# Patient Record
Sex: FEMALE | Race: WHITE | NOT HISPANIC OR LATINO | Employment: FULL TIME | URBAN - METROPOLITAN AREA
[De-identification: names, ages, dates, MRNs, and addresses within clinical notes are randomized per-mention and may not be internally consistent; named-entity substitution may affect disease eponyms.]

---

## 2019-12-13 ENCOUNTER — OFFICE VISIT (OUTPATIENT)
Dept: OBSTETRICS AND GYNECOLOGY | Age: 39
End: 2019-12-13

## 2019-12-13 VITALS
DIASTOLIC BLOOD PRESSURE: 86 MMHG | HEIGHT: 66 IN | BODY MASS INDEX: 47.09 KG/M2 | SYSTOLIC BLOOD PRESSURE: 128 MMHG | WEIGHT: 293 LBS

## 2019-12-13 DIAGNOSIS — N46.9 MALE FACTOR INFERTILITY: ICD-10-CM

## 2019-12-13 DIAGNOSIS — E61.1 LOW IRON: ICD-10-CM

## 2019-12-13 DIAGNOSIS — Z00.00 HEALTH MAINTENANCE EXAMINATION: Primary | ICD-10-CM

## 2019-12-13 DIAGNOSIS — Z12.4 PAP SMEAR FOR CERVICAL CANCER SCREENING: ICD-10-CM

## 2019-12-13 DIAGNOSIS — E66.01 MORBID OBESITY WITH BMI OF 50.0-59.9, ADULT (HCC): ICD-10-CM

## 2019-12-13 LAB
BASOPHILS # BLD AUTO: 0.04 10*3/MM3 (ref 0–0.2)
BASOPHILS NFR BLD AUTO: 0.6 % (ref 0–1.5)
EOSINOPHIL # BLD AUTO: 0.1 10*3/MM3 (ref 0–0.4)
EOSINOPHIL NFR BLD AUTO: 1.4 % (ref 0.3–6.2)
ERYTHROCYTE [DISTWIDTH] IN BLOOD BY AUTOMATED COUNT: 14.3 % (ref 12.3–15.4)
FERRITIN SERPL-MCNC: 34.3 NG/ML (ref 13–150)
HCT VFR BLD AUTO: 36.5 % (ref 34–46.6)
HGB BLD-MCNC: 12 G/DL (ref 12–15.9)
IMM GRANULOCYTES # BLD AUTO: 0.01 10*3/MM3 (ref 0–0.05)
IMM GRANULOCYTES NFR BLD AUTO: 0.1 % (ref 0–0.5)
LYMPHOCYTES # BLD AUTO: 1.84 10*3/MM3 (ref 0.7–3.1)
LYMPHOCYTES NFR BLD AUTO: 25.4 % (ref 19.6–45.3)
MCH RBC QN AUTO: 27.6 PG (ref 26.6–33)
MCHC RBC AUTO-ENTMCNC: 32.9 G/DL (ref 31.5–35.7)
MCV RBC AUTO: 83.9 FL (ref 79–97)
MONOCYTES # BLD AUTO: 0.28 10*3/MM3 (ref 0.1–0.9)
MONOCYTES NFR BLD AUTO: 3.9 % (ref 5–12)
NEUTROPHILS # BLD AUTO: 4.98 10*3/MM3 (ref 1.7–7)
NEUTROPHILS NFR BLD AUTO: 68.6 % (ref 42.7–76)
NRBC BLD AUTO-RTO: 0 /100 WBC (ref 0–0.2)
PLATELET # BLD AUTO: 396 10*3/MM3 (ref 140–450)
RBC # BLD AUTO: 4.35 10*6/MM3 (ref 3.77–5.28)
WBC # BLD AUTO: 7.25 10*3/MM3 (ref 3.4–10.8)

## 2019-12-13 PROCEDURE — 99385 PREV VISIT NEW AGE 18-39: CPT | Performed by: OBSTETRICS & GYNECOLOGY

## 2019-12-13 RX ORDER — OMEPRAZOLE 40 MG/1
CAPSULE, DELAYED RELEASE ORAL
COMMUNITY
Start: 2019-12-03 | End: 2023-03-28

## 2019-12-13 NOTE — PROGRESS NOTES
Routine Annual Visit    2019    Patient: Khadra Khan          MR#:[id number]      Chief Complaint   Patient presents with   • Establish Care     New patient.  Getting ready to go through IVF to conceive. Dr. Dean ([facility])  Has been trying to lose weight for 4 years. Loses and gains constantly.  Thinking of weight loss surgery.   • Gynecologic Exam     Annual.         History of Present Illness    38 y.o. female  who presents for annual exam.   She has been sick for about a month, has taken antbx and cough medication and has been getting better.    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  They have been trying for over a year, he had a semen analysis performed that was abnormal.  He has had a sperm retrieval, and the plan is for IVF in the future.  Dr. Dean has recommended that prior to proceeding with IVF she decrease her BMI to 35.  She has considered weight loss surgery, but her current insurance will not cover it.  She is getting a new insurance plan after the first of the year.    Has regular menses, not particularly heavy and happy with them.    Recently had an issue with peripheral edema when got back from vacation. Had been eating a lot of salty food and sitting in the car. Went to urgent care and at the hospital had testing, lasix and improved. No orthopnea. Usually mild edema if has been up on her feet, goes away with elevation.    Health Maintenance  Gardasil no  Last pap 4 yrs ago, no abnormal   Mammogram not yet  Colonoscopy in high school, not yet for screening  PCP Dr. Arellano    Patient's last menstrual period was 2019 (exact date).  Obstetric History:  OB History        0    Para   0    Term   0       0    AB   0    Living   0       SAB   0    TAB   0    Ectopic   0    Molar   0    Multiple   0    Live Births   0               Menstrual History:     Patient's last menstrual period was 2019 (exact date).    "    ________________________________________  There is no problem list on file for this patient.      Past Medical History:   Diagnosis Date   • Arthritis    • Asthma     Seasonal   • H/O hiatal hernia        Family History   Problem Relation Age of Onset   • Hypertension Father    • Hypertension Mother    • Anxiety disorder Mother    • Depression Mother    • Heart disease Paternal Grandfather    • Kidney failure Maternal Grandmother         dialysis       Past Surgical History:   Procedure Laterality Date   • COLONOSCOPY      GI issues while in high school.    • TONSILLECTOMY         Social History     Tobacco Use   Smoking Status Never Smoker   Smokeless Tobacco Never Used       has a current medication list which includes the following prescription(s): omeprazole and omeprazole.  ________________________________________    Current contraception: none  History of abnormal Pap smear: no  Family history of Breast, ovarian, uterine, colon, pancreatic cancer: no  History of abnormal mammogram: NA    The following portions of the patient's history were reviewed and updated as appropriate: allergies, current medications, past family history, past medical history, past social history, past surgical history and problem list.    Review of Systems   Constitutional: Negative for fever and unexpected weight change.   HENT: Positive for postnasal drip and sore throat.    Respiratory: Positive for cough. Negative for shortness of breath.    Cardiovascular: Negative for chest pain.   Gastrointestinal: Negative for abdominal pain, constipation and diarrhea.   Genitourinary: Negative for frequency and urgency.   Hematological: Negative for adenopathy.   Psychiatric/Behavioral: Negative for dysphoric mood.       Objective   Physical Exam    /86   Ht 167.6 cm (66\")   Wt (!) 142 kg (314 lb)   LMP 12/06/2019 (Exact Date)   Breastfeeding No   BMI 50.68 kg/m²    BP Readings from Last 3 Encounters:   12/13/19 128/86   08/25/19 " 144/88   10/26/16 128/70      Wt Readings from Last 3 Encounters:   12/13/19 (!) 142 kg (314 lb)   10/26/16 132 kg (290 lb)         BMI: Body mass index is 50.68 kg/m².       General:   alert, appears stated age and cooperative   Heart:: regular rate and rhythm, S1, S2 normal, no murmur, click, rub or gallop   Lungs: normal respiratory effort and auscultation   Abdomen: soft, non-tender, without masses or organomegaly and obese   Breast: inspection negative, no nipple discharge or bleeding, no masses or nodularity palpable   Urethra and bladder: urethral meatus normal; bladder nontender to palpation;   Vulva: normal, Bartholin's, Urethra, [place]'s normal   Vagina: normal mucosa, normal discharge   Cervix: no lesions and nulliparous appearance   Uterus: normal size or anteverted   Adnexa: exam limited by habitus       Assessment:    normal annual exam   Obesity  Male factor infertility    Plan:    Plan     []  Mammogram request made  [x]  PAP done  []  Labs:   []  GC/Chl/TV  []  DEXA scan   []  Referral for colonoscopy:     Agreed that weight loss prior to IVF is important and if interested in surgery for weight loss would be in support of it. She may pursue this next year.  Plan to check TSH to ensure not missing a secondary cause of obesity and also for prepregnancy purposes    Counseling  [x]  Nutrition  [x]  Physical activity/regular exercise   [x]  Healthy weight  []  Injury prevention  []  Smoking cessation  []  Substance misuse/abuse  [x]  Sexual behavior  []  STD prevention  []  Contraception  []  Dental health  [x]  Mental health  [x]  Immunization  [x]  Encouraged SBE      Ijeoma Briceno MD  12/13/2019  8:54 AM

## 2019-12-14 PROBLEM — E66.01 MORBID OBESITY WITH BMI OF 50.0-59.9, ADULT: Status: ACTIVE | Noted: 2019-12-14

## 2019-12-14 PROBLEM — N46.9 MALE FACTOR INFERTILITY: Status: ACTIVE | Noted: 2019-12-14

## 2019-12-14 LAB
FT4I SERPL CALC-MCNC: 2.8 (ref 1.2–4.9)
T3RU NFR SERPL: 33 % (ref 24–39)
T4 SERPL-MCNC: 8.5 UG/DL (ref 4.5–12)
TSH SERPL DL<=0.005 MIU/L-ACNC: 2.71 UIU/ML (ref 0.27–4.2)

## 2019-12-16 ENCOUNTER — TELEPHONE (OUTPATIENT)
Dept: OBSTETRICS AND GYNECOLOGY | Age: 39
End: 2019-12-16

## 2019-12-16 NOTE — TELEPHONE ENCOUNTER
----- Message from Ijeoma Briceno MD sent at 12/16/2019  7:55 AM EST -----  Regarding: FW:  Notify that her thyroid is normal, no anemia, her iron is [place] but at lower end, take prenatal or other multivit with iron  ----- Message -----  From: Interface, Reflab Results In  Sent: 12/14/2019   7:36 AM EST  To: Ijeoma Briceno MD

## 2019-12-17 ENCOUNTER — TELEPHONE (OUTPATIENT)
Dept: OBSTETRICS AND GYNECOLOGY | Age: 39
End: 2019-12-17

## 2019-12-17 LAB
CYTOLOGIST CVX/VAG CYTO: NORMAL
CYTOLOGY CVX/VAG DOC CYTO: NORMAL
CYTOLOGY CVX/VAG DOC THIN PREP: NORMAL
DX ICD CODE: NORMAL
HIV 1 & 2 AB SER-IMP: NORMAL
HPV I/H RISK 4 DNA CVX QL PROBE+SIG AMP: NEGATIVE
OTHER STN SPEC: NORMAL
STAT OF ADQ CVX/VAG CYTO-IMP: NORMAL

## 2019-12-17 NOTE — TELEPHONE ENCOUNTER
----- Message from Ijeoma Briceno MD sent at 12/17/2019 11:57 AM EST -----  Please notify her pap was normal    ----- Message -----  From: Interface, Reflab Results In  Sent: 12/14/2019   7:36 AM EST  To: Ijeoma Briceno MD

## 2020-12-22 ENCOUNTER — OFFICE VISIT (OUTPATIENT)
Dept: OBSTETRICS AND GYNECOLOGY | Age: 40
End: 2020-12-22

## 2020-12-22 VITALS
SYSTOLIC BLOOD PRESSURE: 120 MMHG | DIASTOLIC BLOOD PRESSURE: 72 MMHG | WEIGHT: 283 LBS | BODY MASS INDEX: 45.48 KG/M2 | HEIGHT: 66 IN

## 2020-12-22 DIAGNOSIS — E66.01 MORBID OBESITY WITH BMI OF 50.0-59.9, ADULT (HCC): ICD-10-CM

## 2020-12-22 DIAGNOSIS — N46.9 MALE FACTOR INFERTILITY: ICD-10-CM

## 2020-12-22 DIAGNOSIS — Z01.419 WELL WOMAN EXAM WITH ROUTINE GYNECOLOGICAL EXAM: Primary | ICD-10-CM

## 2020-12-22 PROCEDURE — 99395 PREV VISIT EST AGE 18-39: CPT | Performed by: OBSTETRICS & GYNECOLOGY

## 2020-12-22 RX ORDER — FLUTICASONE PROPIONATE 50 MCG
2 SPRAY, SUSPENSION (ML) NASAL DAILY
COMMUNITY
End: 2023-02-09

## 2020-12-22 RX ORDER — ACETAMINOPHEN AND CODEINE PHOSPHATE 120; 12 MG/5ML; MG/5ML
1 SOLUTION ORAL DAILY
Qty: 84 TABLET | Refills: 3 | Status: SHIPPED | OUTPATIENT
Start: 2020-12-22 | End: 2021-11-15

## 2020-12-22 NOTE — PROGRESS NOTES
Routine Annual Visit    2020    Patient: Khadra Khan          MR#:[id number]      Chief Complaint   Patient presents with   • Gynecologic Exam     last pap 19(-) no complaints today        History of Present Illness    39 y.o. female  who presents for annual exam.   She just had gastric sleeve surgery, has been losing weight. No complications after surgery. Plan to weight about 18 mo to two years to conceive with IVF, male factor infertility. Has had semen extracted and plan for ICSI.   Has regular monthly menses, no bad cramping and not heavy  Notes that she has had elevated bp in the past, no real dx of HTN but has been elevated in the past.  Is aware that she needs to wait in order to conceive.  Her  has had abnormal semen analyses, but would rather be safe.    Health Maintenance  Last pap:  normal, history abnormal: none  Family history of Breast, ovarian, uterine, colon, pancreatic cancer: no    Current contraception: none    Patient's last menstrual period was 2020 (exact date).  Obstetric History:  OB History        0    Para   0    Term   0       0    AB   0    Living   0       SAB   0    TAB   0    Ectopic   0    Molar   0    Multiple   0    Live Births   0               Menstrual History:     Patient's last menstrual period was 2020 (exact date).       ________________________________________  Patient Active Problem List   Diagnosis   • Morbid obesity with BMI of 50.0-59.9, adult (CMS/[facility])   • Male factor infertility       Past Medical History:   Diagnosis Date   • Arthritis    • Asthma     Seasonal   • GERD (gastroesophageal reflux disease)    • H/O hiatal hernia        Family History   Problem Relation Age of Onset   • Hypertension Father    • Anxiety disorder Father    • Hypertension Mother    • Anxiety disorder Mother    • Depression Mother    • Heart disease Paternal Grandfather    • Kidney failure Maternal Grandmother         dialysis   •  "Breast cancer Neg Hx    • Ovarian cancer Neg Hx    • Uterine cancer Neg Hx    • Colon cancer Neg Hx    • Melanoma Neg Hx    • Prostate cancer Neg Hx    • Pancreatic cancer Neg Hx    • Congenital heart disease Neg Hx    • Cystic fibrosis Neg Hx        Past Surgical History:   Procedure Laterality Date   • COLONOSCOPY      GI issues while in high school.    • GASTRIC SLEEVE LAPAROSCOPIC     • TONSILLECTOMY     • TYMPANOSTOMY TUBE PLACEMENT     • WISDOM TOOTH EXTRACTION         Social History     Tobacco Use   Smoking Status Never Smoker   Smokeless Tobacco Never Used       has a current medication list which includes the following prescription(s): fluticasone, omeprazole, and omeprazole.  ________________________________________      Review of Systems   Constitutional: Negative for fever and unexpected weight change.   Respiratory: Negative for shortness of breath.    Cardiovascular: Negative for chest pain.   Gastrointestinal: Positive for constipation. Negative for abdominal pain and diarrhea.   Genitourinary: Negative for frequency and urgency.   Hematological: Negative for adenopathy.   Psychiatric/Behavioral: Negative for dysphoric mood.       Objective   Physical Exam    /72   Ht 167.6 cm (66\")   Wt 128 kg (283 lb)   LMP 12/13/2020 (Exact Date)   Breastfeeding No   BMI 45.68 kg/m²    BP Readings from Last 3 Encounters:   12/22/20 120/72   12/13/19 128/86   08/25/19 144/88      Wt Readings from Last 3 Encounters:   12/22/20 128 kg (283 lb)   12/13/19 (!) 142 kg (314 lb)   10/26/16 132 kg (290 lb)         BMI: Body mass index is 45.68 kg/m².       General:   alert, appears stated age and cooperative   Neck: No thyromegaly or LAD, no carotid bruit noted   Heart:: regular rate and rhythm, S1, S2 normal, no murmur, click, rub or gallop   Lungs: normal respiratory effort and auscultation   Abdomen: soft, non-tender, without masses or organomegaly and obese, incisions from surgery healing well, has bandaids " in place from recent mole removal with derm   Breast: inspection negative, no nipple discharge or bleeding, no masses or nodularity palpable   Urethra and bladder: urethral meatus normal; bladder nontender to palpation;   Vulva: normal, Bartholin's, Urethra, [place]'s normal   Vagina: normal mucosa, normal discharge   Cervix: no lesions and nulliparous appearance   Uterus: normal size or anteverted   Adnexa: normal adnexa and no mass, fullness, tenderness       Assessment:    normal annual exam   Obesity, recent weight loss surgery  Male factor infertility    Plan:    Plan     []  Mammogram request made  []  PAP done up-to-date  []  Labs:   []  GC/Chl/TV  []  DEXA scan   []  Referral for colonoscopy:     Recommended starting on a progestin only contraceptive due to her history of labile blood pressure.  I discussed that if her  were to somehow have some sperm in the semen, she could get pregnant and if she were to have any component of unexplained infertility, that could resolve with losing weight.  It is important that she not become pregnant immediately after weight loss surgery.    Britneyor was sent to the pharmacy    Counseling  [x]  Nutrition  [x]  Physical activity/regular exercise   [x]  Healthy weight  []  Injury prevention  []  Smoking cessation  []  Substance misuse/abuse  [x]  Sexual behavior  []  STD prevention  [x]  Contraception  []  Dental health  [x]  Mental health  [x]  Immunization  [x]  Encouraged SBE      Patient's BMI is Body mass index is 45.68 kg/m²., which is classified as obese. We discussed health consequences of obesity and recommended weight loss. I counseled regarding diet modifications and exercise in order to reach weight loss goal.     Ijeoma Briceno MD  12/22/2020  11:11 EST

## 2021-11-15 RX ORDER — NORETHINDRONE 0.35 MG/1
TABLET ORAL
Qty: 84 TABLET | Refills: 3 | Status: SHIPPED | OUTPATIENT
Start: 2021-11-15 | End: 2022-03-22 | Stop reason: SDUPTHER

## 2022-03-22 ENCOUNTER — OFFICE VISIT (OUTPATIENT)
Dept: OBSTETRICS AND GYNECOLOGY | Age: 42
End: 2022-03-22

## 2022-03-22 VITALS
DIASTOLIC BLOOD PRESSURE: 78 MMHG | WEIGHT: 278 LBS | SYSTOLIC BLOOD PRESSURE: 126 MMHG | HEIGHT: 66 IN | BODY MASS INDEX: 44.68 KG/M2

## 2022-03-22 DIAGNOSIS — Z90.3 HISTORY OF PARTIAL GASTRECTOMY: ICD-10-CM

## 2022-03-22 DIAGNOSIS — Z01.419 ENCOUNTER FOR GYNECOLOGICAL EXAMINATION WITHOUT ABNORMAL FINDING: ICD-10-CM

## 2022-03-22 DIAGNOSIS — Z00.00 HEALTH MAINTENANCE EXAMINATION: ICD-10-CM

## 2022-03-22 DIAGNOSIS — Z31.9 DESIRE FOR PREGNANCY: ICD-10-CM

## 2022-03-22 DIAGNOSIS — Z11.51 SCREENING FOR HUMAN PAPILLOMAVIRUS: Primary | ICD-10-CM

## 2022-03-22 PROBLEM — Z14.1 CYSTIC FIBROSIS CARRIER: Status: ACTIVE | Noted: 2022-03-22

## 2022-03-22 PROCEDURE — 99395 PREV VISIT EST AGE 18-39: CPT | Performed by: OBSTETRICS & GYNECOLOGY

## 2022-03-22 RX ORDER — METHOCARBAMOL 750 MG/1
TABLET, FILM COATED ORAL
COMMUNITY
Start: 2021-12-13 | End: 2023-02-09

## 2022-03-22 RX ORDER — CHLORCYCLIZINE HYDROCHLORIDE AND PSEUDOEPHEDRINE HYDROCHLORIDE 25; 60 MG/1; MG/1
TABLET ORAL
COMMUNITY
Start: 2022-03-17 | End: 2023-02-09

## 2022-03-22 RX ORDER — TRIAMCINOLONE ACETONIDE 55 UG/1
2 SPRAY, METERED NASAL DAILY
COMMUNITY

## 2022-03-22 RX ORDER — ACETAMINOPHEN AND CODEINE PHOSPHATE 120; 12 MG/5ML; MG/5ML
1 SOLUTION ORAL DAILY
Qty: 84 TABLET | Refills: 3 | Status: SHIPPED | OUTPATIENT
Start: 2022-03-22 | End: 2023-02-09

## 2022-03-22 RX ORDER — CITALOPRAM 20 MG/1
20 TABLET ORAL DAILY
COMMUNITY
Start: 2021-07-16 | End: 2023-02-09

## 2022-03-22 RX ORDER — BUSPIRONE HYDROCHLORIDE 15 MG/1
TABLET ORAL
COMMUNITY
Start: 2022-01-17 | End: 2023-02-09

## 2022-03-22 NOTE — PROGRESS NOTES
Routine Annual Visit    3/22/2022    Patient: Khadra Khan          MR#:[id number]      Chief Complaint   Patient presents with   • Gynecologic Exam     AE Today, Last AE 2020, Last pap 2019 (-), HPV (-)       History of Present Illness    41 y.o. female  who presents for annual exam. She has been trying to lose weight, had lost more weight but gained some back. She has a goal of around 250 lb for IVF  Has restarted buspar and celexa, her mood has improved  She plans to meet with Dr. Dean again this late spring/summer to discuss IVF    Health Maintenance  Last pap:   Family history of Breast, ovarian, uterine, colon, pancreatic cancer: no    Patient's last menstrual period was 03/15/2022 (within days).  Obstetric History:  OB History        0    Para   0    Term   0       0    AB   0    Living   0       SAB   0    IAB   0    Ectopic   0    Molar   0    Multiple   0    Live Births   0               Menstrual History:     Patient's last menstrual period was 03/15/2022 (within days).       ________________________________________  Patient Active Problem List   Diagnosis   • Morbid obesity with BMI of 50.0-59.9, adult (HCC)   • Male factor infertility       Past Medical History:   Diagnosis Date   • Arthritis    • Asthma     Seasonal   • GERD (gastroesophageal reflux disease)    • H/O hiatal hernia        Family History   Problem Relation Age of Onset   • Hypertension Father    • Anxiety disorder Father    • Hypertension Mother    • Anxiety disorder Mother    • Depression Mother    • Heart disease Paternal Grandfather    • Kidney failure Maternal Grandmother         dialysis   • Breast cancer Neg Hx    • Ovarian cancer Neg Hx    • Uterine cancer Neg Hx    • Colon cancer Neg Hx    • Melanoma Neg Hx    • Prostate cancer Neg Hx    • Pancreatic cancer Neg Hx    • Congenital heart disease Neg Hx    • Cystic fibrosis Neg Hx        Past Surgical History:   Procedure Laterality Date   •  "COLONOSCOPY      GI issues while in high school.    • GASTRIC SLEEVE LAPAROSCOPIC  09/01/2020    shekhar will   • TONSILLECTOMY     • TYMPANOSTOMY TUBE PLACEMENT     • WISDOM TOOTH EXTRACTION         Social History     Tobacco Use   Smoking Status Never Smoker   Smokeless Tobacco Never Used       has a current medication list which includes the following prescription(s): buspirone, citalopram, fluticasone, jencycla, methocarbamol, omeprazole, omeprazole, stahist ad, and triamcinolone acetonide.  ________________________________________      Review of Systems   Constitutional: Negative for fever and unexpected weight change.   Respiratory: Negative for shortness of breath.    Cardiovascular: Negative for chest pain.   Gastrointestinal: Negative for abdominal pain, constipation and diarrhea.   Genitourinary: Negative for frequency and urgency.   Hematological: Negative for adenopathy.   Psychiatric/Behavioral: Negative for dysphoric mood.       Objective   Physical Exam    /78   Ht 167.6 cm (66\")   Wt 126 kg (278 lb)   LMP 03/15/2022 (Within Days)   Breastfeeding No   BMI 44.87 kg/m²    BP Readings from Last 3 Encounters:   03/22/22 126/78   12/22/20 120/72   12/13/19 128/86      Wt Readings from Last 3 Encounters:   03/22/22 126 kg (278 lb)   12/22/20 128 kg (283 lb)   12/13/19 (!) 142 kg (314 lb)         BMI: Body mass index is 44.87 kg/m².       General:   alert, appears stated age and cooperative   Neck: No thyromegaly or LAD   Heart:: regular rate and rhythm, S1, S2 normal, no murmur, click, rub or gallop   Lungs: normal respiratory effort and auscultation   Abdomen: soft, non-tender, without masses or organomegaly   Breast: inspection negative, no nipple discharge or bleeding, no masses or nodularity palpable   Urethra and bladder: urethral meatus normal; bladder nontender to palpation;   Vulva: normal, Bartholin's, Urethra, [place]'s normal   Vagina: normal mucosa, normal discharge   Cervix: no " lesions and nulliparous appearance   Uterus: normal size and anteverted   Adnexa: exam limited by habitus       Assessment:    normal annual exam   Desire for pregnancy    Plan:    Plan     []  Mammogram request made  [x]  PAP done  [x]  Labs:  Health maintenance labs and immune status prior to pregnancy  []  GC/Chl/TV  []  DEXA scan   []  Referral for colonoscopy:     Discussed losing weight prior to IVF and pregnancy.  She has been working on it.    Counseling  [x]  Nutrition  [x]  Physical activity/regular exercise   [x]  Healthy weight  []  Injury prevention  []  Smoking cessation  []  Substance misuse/abuse  [x]  Sexual behavior  []  STD prevention  [x]  Contraception continue micronor  []  Dental health  []  Mental health  []  Immunization  [x]  Encouraged SBE        Ijeoma Briceno MD  03/22/2022  08:23 EDT

## 2022-03-23 LAB
25(OH)D3+25(OH)D2 SERPL-MCNC: 31.4 NG/ML (ref 30–100)
ALBUMIN SERPL-MCNC: 4.6 G/DL (ref 3.8–4.8)
ALBUMIN/GLOB SERPL: 1.8 {RATIO} (ref 1.2–2.2)
ALP SERPL-CCNC: 103 IU/L (ref 44–121)
ALT SERPL-CCNC: 9 IU/L (ref 0–32)
AST SERPL-CCNC: 12 IU/L (ref 0–40)
BILIRUB SERPL-MCNC: 0.5 MG/DL (ref 0–1.2)
BUN SERPL-MCNC: 12 MG/DL (ref 6–20)
BUN/CREAT SERPL: 14 (ref 9–23)
CALCIUM SERPL-MCNC: 9.7 MG/DL (ref 8.7–10.2)
CHLORIDE SERPL-SCNC: 102 MMOL/L (ref 96–106)
CO2 SERPL-SCNC: 21 MMOL/L (ref 20–29)
CREAT SERPL-MCNC: 0.88 MG/DL (ref 0.57–1)
EGFRCR SERPLBLD CKD-EPI 2021: 86 ML/MIN/1.73
ERYTHROCYTE [DISTWIDTH] IN BLOOD BY AUTOMATED COUNT: 13 % (ref 11.7–15.4)
FOLATE SERPL-MCNC: 7.4 NG/ML
GLOBULIN SER CALC-MCNC: 2.5 G/DL (ref 1.5–4.5)
GLUCOSE SERPL-MCNC: 111 MG/DL (ref 65–99)
HBA1C MFR BLD: 5.7 % (ref 4.8–5.6)
HCT VFR BLD AUTO: 39.4 % (ref 34–46.6)
HGB BLD-MCNC: 12.7 G/DL (ref 11.1–15.9)
MCH RBC QN AUTO: 27.4 PG (ref 26.6–33)
MCHC RBC AUTO-ENTMCNC: 32.2 G/DL (ref 31.5–35.7)
MCV RBC AUTO: 85 FL (ref 79–97)
PLATELET # BLD AUTO: 377 X10E3/UL (ref 150–450)
POTASSIUM SERPL-SCNC: 4.3 MMOL/L (ref 3.5–5.2)
PROT SERPL-MCNC: 7.1 G/DL (ref 6–8.5)
RBC # BLD AUTO: 4.64 X10E6/UL (ref 3.77–5.28)
RUBV IGG SERPL IA-ACNC: 2.74 INDEX
SODIUM SERPL-SCNC: 140 MMOL/L (ref 134–144)
VZV IGG SER IA-ACNC: 560 INDEX
WBC # BLD AUTO: 7.7 X10E3/UL (ref 3.4–10.8)

## 2022-03-24 RX ORDER — METFORMIN HYDROCHLORIDE 500 MG/1
500 TABLET, EXTENDED RELEASE ORAL
Qty: 90 TABLET | Refills: 3 | Status: SHIPPED | OUTPATIENT
Start: 2022-03-24 | End: 2023-03-13 | Stop reason: SDUPTHER

## 2022-04-21 ENCOUNTER — TELEPHONE (OUTPATIENT)
Dept: OBSTETRICS AND GYNECOLOGY | Age: 42
End: 2022-04-21

## 2022-04-21 NOTE — TELEPHONE ENCOUNTER
Patient called to follow up with you after meeting with Dr. Trina Pittman at [facility].  After doing ultrasounds today, she appears to have health ovaries close to where needs to be and she states Dr. Pittman states they may be able to make exception and start IVF.  She is scheduled to Follow up May 17 to meet with anesthesiologist to see if he will approve to put her under anesthesia to perform egg retrieval.  Dr. Pittman wants her to quit her  birth control ASAP to see what changes come. She states according to ultrasound performed today there are 8 eggs in each ovary.  Patient states if you would like to call her that would be great.

## 2022-04-22 NOTE — TELEPHONE ENCOUNTER
Called her back and discussed, may be proceeding with IVF this summer, meeting with anesthesiologist and Dr. Pittman may 17  Discontinued continued weight loss    Ijeoma Briceno MD  4/22/2022  11:18 EDT

## 2022-05-17 ENCOUNTER — TELEPHONE (OUTPATIENT)
Dept: OBSTETRICS AND GYNECOLOGY | Age: 42
End: 2022-05-17

## 2022-05-17 NOTE — TELEPHONE ENCOUNTER
Patient calling to let you know that they met with Trina Pittman (infertility) today. She has given the okay to start treatment with a timeline. They will do the egg transfer in July and implant in Aug or Sept. She just wanted to keep in the loop about what was going on, feel free to give her a call if you need to [phone]

## 2022-07-07 ENCOUNTER — TELEPHONE (OUTPATIENT)
Dept: OBSTETRICS AND GYNECOLOGY | Age: 42
End: 2022-07-07

## 2022-07-07 NOTE — TELEPHONE ENCOUNTER
Provider: DR. HASSAN    Caller: SANJEEV PRITCHARD  Relationship to Patient: SELF  Phone Number: [phone]    Reason for Call: PT CALLING TO GIVE DR. HASSAN UPDATES ON FERTILITY, STARTED FERTILITY INJECTIONS 07/01, PLANNING ON DOING EGG RETREIVE  NEXT WED THE 13TH. SENDING THE EMBRYOS FOR PTT SCREENING. PT STATES  AT FERTILITY NOTED RIGHT NOW MEASURING FOLLICLES  AT 12 OVER 9MM     PT CALLING TO GIVE DR. HASSAN UPDATES, SHE WILL CONTINUE TO GIVE UPDATES AS SHE CONTINUES WITH THIS.     OK TO CALL BACK IF NEEDED, OK TO LEAVE A VM.

## 2023-02-09 ENCOUNTER — OFFICE VISIT (OUTPATIENT)
Dept: OBSTETRICS AND GYNECOLOGY | Age: 43
End: 2023-02-09
Payer: COMMERCIAL

## 2023-02-09 VITALS
SYSTOLIC BLOOD PRESSURE: 116 MMHG | DIASTOLIC BLOOD PRESSURE: 78 MMHG | BODY MASS INDEX: 47.09 KG/M2 | WEIGHT: 293 LBS | HEIGHT: 66 IN

## 2023-02-09 DIAGNOSIS — O09.811 PREGNANCY RESULTING FROM IN VITRO FERTILIZATION IN FIRST TRIMESTER: ICD-10-CM

## 2023-02-09 DIAGNOSIS — Z3A.11 11 WEEKS GESTATION OF PREGNANCY: Primary | ICD-10-CM

## 2023-02-09 PROBLEM — O09.819 PREGNANCY CONCEIVED THROUGH IN VITRO FERTILIZATION: Status: ACTIVE | Noted: 2023-02-09

## 2023-02-09 PROBLEM — Z90.3 HISTORY OF SLEEVE GASTRECTOMY: Status: ACTIVE | Noted: 2020-09-30

## 2023-02-09 PROBLEM — O09.529 AMA (ADVANCED MATERNAL AGE) MULTIGRAVIDA 35+: Status: ACTIVE | Noted: 2023-02-09

## 2023-02-09 PROBLEM — Z34.90 PREGNANCY: Status: ACTIVE | Noted: 2023-02-09

## 2023-02-09 PROCEDURE — 99213 OFFICE O/P EST LOW 20 MIN: CPT | Performed by: NURSE PRACTITIONER

## 2023-02-09 RX ORDER — CETIRIZINE HYDROCHLORIDE 10 MG/1
CAPSULE, LIQUID FILLED ORAL
COMMUNITY

## 2023-02-09 RX ORDER — LEVOTHYROXINE SODIUM 0.07 MG/1
TABLET ORAL
COMMUNITY
End: 2023-03-28 | Stop reason: SDUPTHER

## 2023-02-09 RX ORDER — PRENATAL VIT NO.126/IRON/FOLIC 28MG-0.8MG
1 TABLET ORAL DAILY
COMMUNITY

## 2023-02-09 NOTE — PROGRESS NOTES
[facility]  Routine Annual Visit    2023    Patient: Khadra Khan          MR#:[id number]      History of Present Illness    42 y.o. female  who presents for confirmation of pregnancy.     This pregnancy was achieved through IVF and she was recently cleared by Dr. Pittman. US confirms viable IUP today at 11w1d. This is consistent with her previous ultrasounds with Dr. Pittman. She is very excited and feeling well! No complaints.     Patient's last menstrual period was 2022 (exact date).  Obstetric History:  OB History        1    Para   0    Term   0       0    AB   0    Living   0       SAB   0    IAB   0    Ectopic   0    Molar   0    Multiple   0    Live Births   0               Menstrual History:     Patient's last menstrual period was 2022 (exact date).       Sexual History:       ________________________________________  Patient Active Problem List   Diagnosis   • Morbid obesity with BMI of 50.0-59.9, adult (HCC)   • Male factor infertility   • Cystic fibrosis carrier   • Pregnancy   • History of sleeve gastrectomy       Past Medical History:   Diagnosis Date   • Arthritis    • Asthma     Seasonal   • Cystic fibrosis carrier    • GERD (gastroesophageal reflux disease)    • H/O hiatal hernia        Past Surgical History:   Procedure Laterality Date   • COLONOSCOPY      GI issues while in high school.    • GASTRIC SLEEVE LAPAROSCOPIC  2020    shekhar will   • TONSILLECTOMY     • TRUNK SKIN LESION EXCISIONAL BIOPSY      benign lesion   • TYMPANOSTOMY TUBE PLACEMENT     • WISDOM TOOTH EXTRACTION         Social History     Tobacco Use   Smoking Status Never   Smokeless Tobacco Never       Family History   Problem Relation Age of Onset   • Hypertension Father    • Anxiety disorder Father    • Hypertension Mother    • Anxiety disorder Mother    • Depression Mother    • Heart disease Paternal Grandfather    • Kidney failure Maternal Grandmother          dialysis   • Breast cancer Neg Hx    • Ovarian cancer Neg Hx    • Uterine cancer Neg Hx    • Colon cancer Neg Hx    • Melanoma Neg Hx    • Prostate cancer Neg Hx    • Pancreatic cancer Neg Hx    • Congenital heart disease Neg Hx    • Cystic fibrosis Neg Hx        Prior to Admission medications    Medication Sig Start Date End Date Taking? Authorizing Provider   aspirin 81 MG oral suspension    Yes Zeyad Mccarthy MD   Cetirizine HCl (ZyrTEC Allergy) 10 MG capsule    Yes Zeyad Mccarthy MD   levothyroxine (SYNTHROID, LEVOTHROID) 75 MCG tablet    Yes Zeyad Mccarthy MD   metFORMIN ER (Glucophage XR) 500 MG 24 hr tablet Take 1 tablet by mouth Daily With Breakfast. 3/24/22  Yes Ijeoma Briceno MD   omeprazole (priLOSEC) 40 MG capsule  12/3/19  Yes Zeyad Mccarthy MD   prenatal vitamin (prenatal, CLASSIC, vitamin) tablet Take 1 tablet by mouth Daily.   Yes Zeyad Mccarthy MD   Triamcinolone Acetonide (NASACORT) 55 MCG/ACT nasal inhaler 2 sprays into the nostril(s) as directed by provider Daily.    Zeyad Mccarthy MD   busPIRone (BUSPAR) 15 MG tablet TAKE 1/2 TABLET BY MOUTH TWO TIMES A DAY FOR 2 WEEKS THEN INCREASE TO TAKE ONE TABLET BY MOUTH TWICE A DAY 1/17/22 2/9/23  Zeyad Mccarthy MD   citalopram (CeleXA) 20 MG tablet Take 20 mg by mouth Daily. 7/16/21 2/9/23  Zeyad Mccarthy MD   fluticasone (FLONASE) 50 MCG/ACT nasal spray 2 sprays into the nostril(s) as directed by provider Daily.  2/9/23  Zeyad Mccarthy MD   methocarbamol (ROBAXIN) 750 MG tablet Take 1 or 2 tablets by mouth  3 times per day if needed for pain 12/13/21 2/9/23  Zeyad Mccarthy MD   norethindrone (Jencycla) 0.35 MG tablet Take 1 tablet by mouth Daily. 3/22/22 2/9/23  Ijeoma Briceno MD   omeprazole (PriLOSEC) 20 MG capsule Take 20 mg by mouth Daily.  2/9/23  Emergency, Nurse Júnior, RN   Stahist AD 25-60 MG tablet  3/17/22 2/9/23  Zeyad Mccarthy MD  "    ________________________________________    The following portions of the patient's history were reviewed and updated as appropriate: allergies, current medications, past family history, past medical history, past social history, past surgical history and problem list.    Review of Systems   Constitutional: Negative.    Eyes: Negative for visual disturbance.   Respiratory: Negative for cough, shortness of breath and wheezing.    Cardiovascular: Negative for chest pain, palpitations and leg swelling.   Gastrointestinal: Negative for abdominal distention and abdominal pain.   Endocrine: Negative for cold intolerance and heat intolerance.   Genitourinary: Negative for difficulty urinating, dyspareunia, dysuria, frequency, genital sores, hematuria, menstrual problem, pelvic pain, urgency, vaginal bleeding, vaginal discharge and vaginal pain.   Musculoskeletal: Negative.    Skin: Negative.    Neurological: Negative for dizziness, weakness, light-headedness, numbness and headaches.   Hematological: Negative.    Psychiatric/Behavioral: Negative.        Objective   Physical Exam  Constitutional:       Appearance: Normal appearance. She is not ill-appearing.   Skin:     General: Skin is warm and dry.   Neurological:      General: No focal deficit present.      Mental Status: She is alert and oriented to person, place, and time.   Psychiatric:         Mood and Affect: Mood normal.         Behavior: Behavior normal.         /78   Ht 167.6 cm (66\")   Wt 134 kg (295 lb 6.4 oz)   LMP 11/23/2022 (Exact Date)   BMI 47.68 kg/m²    BP Readings from Last 3 Encounters:   02/09/23 116/78   03/22/22 126/78   12/22/20 120/72      Wt Readings from Last 3 Encounters:   02/09/23 134 kg (295 lb 6.4 oz)   03/22/22 126 kg (278 lb)   12/22/20 128 kg (283 lb)        BMI: Estimated body mass index is 47.68 kg/m² as calculated from the following:    Height as of this encounter: 167.6 cm (66\").    Weight as of this encounter: 134 kg " (295 lb 6.4 oz).        Assessment:    Diagnoses and all orders for this visit:    1. 11 weeks gestation of pregnancy (Primary)  -     ABO / Rh  -     Antibody Screen  -     CBC (No Diff)  -     Hemoglobin A1c  -     Hemoglobinopathy Fractionation Cascade  -     Hepatitis B Surface Antigen  -     Varicella Zoster Antibody, IgG  -     Urine Culture - Urine, Urine, Clean Catch  -     Rubella Antibody, IgG  -     RPR  -     HIV-1 / O / 2 Ag / Antibody 4th Generation  -     Hepatitis C Antibody  -     TSH Rfx On Abnormal To Free T4  -     Chlamydia trachomatis, Neisseria gonorrhoeae, Trichomonas vaginalis, PCR - Urine, Urine, Clean Catch    2. Pregnancy resulting from in vitro fertilization in first trimester      Early pregnancy counseling provided   Patient is taking Prenatal vitamins  Problem list reviewed and updated  Reviewed routine prenatal care with the office to include but not limited to expected weight gain during pregnancy, Tylenol products are fine, avoid aspirin and ibuprofen;  not to change cat litter; food restrictions; avoidance of alcohol, tobacco, drugs and saunas/hot tubs.   All questions answered.  SAB warnings    RTO 2 weeks for OB intake with Dr. Kaity Almanza, APRN  2/9/2023 08:44 EST

## 2023-02-10 LAB
ABO GROUP BLD: NORMAL
BLD GP AB SCN SERPL QL: NEGATIVE
ERYTHROCYTE [DISTWIDTH] IN BLOOD BY AUTOMATED COUNT: 12.7 % (ref 11.7–15.4)
HBA1C MFR BLD: 5.7 % (ref 4.8–5.6)
HBV SURFACE AG SERPL QL IA: NEGATIVE
HCT VFR BLD AUTO: 39 % (ref 34–46.6)
HCV AB S/CO SERPL IA: <0.1 S/CO RATIO (ref 0–0.9)
HGB A MFR BLD ELPH: 97.6 % (ref 96.4–98.8)
HGB A2 MFR BLD ELPH: 2.4 % (ref 1.8–3.2)
HGB BLD-MCNC: 12.6 G/DL (ref 11.1–15.9)
HGB F MFR BLD ELPH: 0 % (ref 0–2)
HGB FRACT BLD-IMP: NORMAL
HGB S MFR BLD ELPH: 0 %
HIV 1+2 AB+HIV1 P24 AG SERPL QL IA: NON REACTIVE
MCH RBC QN AUTO: 28 PG (ref 26.6–33)
MCHC RBC AUTO-ENTMCNC: 32.3 G/DL (ref 31.5–35.7)
MCV RBC AUTO: 87 FL (ref 79–97)
PLATELET # BLD AUTO: 352 X10E3/UL (ref 150–450)
RBC # BLD AUTO: 4.5 X10E6/UL (ref 3.77–5.28)
RH BLD: POSITIVE
RPR SER QL: NON REACTIVE
RUBV IGG SERPL IA-ACNC: 2.18 INDEX
TSH SERPL DL<=0.005 MIU/L-ACNC: 0.91 UIU/ML (ref 0.45–4.5)
VZV IGG SER IA-ACNC: 730 INDEX
WBC # BLD AUTO: 10.6 X10E3/UL (ref 3.4–10.8)

## 2023-02-11 LAB
BACTERIA UR CULT: NORMAL
BACTERIA UR CULT: NORMAL
C TRACH RRNA SPEC QL NAA+PROBE: NEGATIVE
N GONORRHOEA RRNA SPEC QL NAA+PROBE: NEGATIVE
T VAGINALIS RRNA SPEC QL NAA+PROBE: NEGATIVE

## 2023-02-15 PROBLEM — R73.09 ELEVATED HEMOGLOBIN A1C: Status: ACTIVE | Noted: 2023-02-15

## 2023-02-15 NOTE — PROGRESS NOTES
Please notify patient her prenatal labs look good. Her hemoglobin a1c is just mildly elevated at 5.7. Encourage limiting concentrating sweets and focus on lots of lean meats and vegetables.

## 2023-02-28 ENCOUNTER — INITIAL PRENATAL (OUTPATIENT)
Dept: OBSTETRICS AND GYNECOLOGY | Age: 43
End: 2023-02-28
Payer: COMMERCIAL

## 2023-02-28 VITALS — DIASTOLIC BLOOD PRESSURE: 76 MMHG | WEIGHT: 293 LBS | SYSTOLIC BLOOD PRESSURE: 124 MMHG | BODY MASS INDEX: 47.29 KG/M2

## 2023-02-28 DIAGNOSIS — E86.0 DEHYDRATION DURING PREGNANCY: ICD-10-CM

## 2023-02-28 DIAGNOSIS — O99.280 DEHYDRATION DURING PREGNANCY: Primary | ICD-10-CM

## 2023-02-28 DIAGNOSIS — Z90.3 HISTORY OF SLEEVE GASTRECTOMY: ICD-10-CM

## 2023-02-28 DIAGNOSIS — Z13.89 SCREENING FOR BLOOD OR PROTEIN IN URINE: Primary | ICD-10-CM

## 2023-02-28 DIAGNOSIS — O09.522 MULTIGRAVIDA OF ADVANCED MATERNAL AGE IN SECOND TRIMESTER: ICD-10-CM

## 2023-02-28 DIAGNOSIS — O09.812 PREGNANCY RESULTING FROM IN VITRO FERTILIZATION IN SECOND TRIMESTER: ICD-10-CM

## 2023-02-28 DIAGNOSIS — E03.8 SUBCLINICAL HYPOTHYROIDISM: ICD-10-CM

## 2023-02-28 DIAGNOSIS — O99.280 DEHYDRATION DURING PREGNANCY: ICD-10-CM

## 2023-02-28 DIAGNOSIS — E86.0 DEHYDRATION DURING PREGNANCY: Primary | ICD-10-CM

## 2023-02-28 DIAGNOSIS — O09.92 HIGH-RISK PREGNANCY IN SECOND TRIMESTER: ICD-10-CM

## 2023-02-28 PROBLEM — O09.72 SUPERVISION OF HIGH RISK PREGNANCY DUE TO SOCIAL PROBLEMS IN SECOND TRIMESTER: Status: ACTIVE | Noted: 2023-02-28

## 2023-02-28 LAB
BILIRUB BLD-MCNC: ABNORMAL MG/DL
GLUCOSE UR STRIP-MCNC: NEGATIVE MG/DL
KETONES UR QL: ABNORMAL
LEUKOCYTE EST, POC: ABNORMAL
NITRITE UR-MCNC: NEGATIVE MG/ML
PH UR: 5.5 [PH] (ref 5–8)
PROT UR STRIP-MCNC: ABNORMAL MG/DL
RBC # UR STRIP: NEGATIVE /UL
SP GR UR: 1.03 (ref 1–1.03)
UROBILINOGEN UR QL: ABNORMAL

## 2023-02-28 PROCEDURE — 0501F PRENATAL FLOW SHEET: CPT | Performed by: OBSTETRICS & GYNECOLOGY

## 2023-02-28 RX ORDER — DEXTROSE AND SODIUM CHLORIDE 5; .9 G/100ML; G/100ML
1000 INJECTION, SOLUTION INTRAVENOUS WEEKLY
Status: CANCELLED | OUTPATIENT
Start: 2023-02-28

## 2023-02-28 RX ORDER — BUSPIRONE HYDROCHLORIDE 7.5 MG/1
7.5 TABLET ORAL 2 TIMES DAILY
Qty: 60 TABLET | Refills: 11 | Status: SHIPPED | OUTPATIENT
Start: 2023-02-28

## 2023-02-28 RX ORDER — CITALOPRAM 10 MG/1
10 TABLET ORAL DAILY
Qty: 30 TABLET | Refills: 11 | Status: SHIPPED | OUTPATIENT
Start: 2023-02-28

## 2023-02-28 NOTE — PROGRESS NOTES
Chief Complaint   Patient presents with   • Routine Prenatal Visit     13w6d OB Check      Khadra Khan presents for OB follow up. She generally doesn't feel well, she is tired and little energy. 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 She has been feeling dizzy with positional changes and went to [place] with syncopal episode and received fluids which helped quite a bit.   She has history of anxiety and depression, was previously on celexa and buspar and has been feeling as though she needs restart these. She is already aware of possible  risks and discussed she is now out of organogenesis  Reviewed PNL, A1c slightly elevated and has risk factors, plan for early GCT     on abd sonogram today    Brief Urine Lab Results  (Last result in the past 365 days)      Color   Clarity   Blood   Leuk Est   Nitrite   Protein   CREAT   Urine HCG        23 0848     Negative   Small (1+)   Negative   100 mg/dL                 Diagnoses and all orders for this visit:    1. Screening for blood or protein in urine (Primary)  -     POC Urinalysis Dipstick    2. Subclinical hypothyroidism    3. High-risk pregnancy in second trimester    4. Dehydration during pregnancy    5. Multigravida of advanced maternal age in second trimester    6. History of sleeve gastrectomy    7. Pregnancy resulting from in vitro fertilization in second trimester    Other orders  -     citalopram (CeleXA) 10 MG tablet; Take 1 tablet by mouth Daily.  Dispense: 30 tablet; Refill: 11  -     busPIRone (BUSPAR) 7.5 MG tablet; Take 1 tablet by mouth 2 (Two) Times a Day.  Dispense: 60 tablet; Refill: 11      Plan for IV fluids due to dehydration, may need them weekly or even more frequently and discussed oral hydration, oral electrolyte solution  Will check vit D, folate, B12 due to hx of gastric sleeve surgery  Early GCT  due to obesity, slightly elevated A1c, continue metformin   Recheck for protienuria after resolution of her dehydration  Normal PGT, but will plan for NIPT around 16 wks    FU one week    Ijeoma Briceno MD  2/28/2023  10:53 EST

## 2023-03-01 ENCOUNTER — HOSPITAL ENCOUNTER (OUTPATIENT)
Dept: INFUSION THERAPY | Facility: HOSPITAL | Age: 43
Discharge: HOME | End: 2023-03-01
Admitting: OBSTETRICS & GYNECOLOGY
Payer: COMMERCIAL

## 2023-03-01 VITALS
RESPIRATION RATE: 20 BRPM | TEMPERATURE: 97.5 F | OXYGEN SATURATION: 99 % | SYSTOLIC BLOOD PRESSURE: 133 MMHG | HEART RATE: 95 BPM | DIASTOLIC BLOOD PRESSURE: 66 MMHG

## 2023-03-01 DIAGNOSIS — O99.280 DEHYDRATION DURING PREGNANCY: Primary | ICD-10-CM

## 2023-03-01 DIAGNOSIS — E86.0 DEHYDRATION DURING PREGNANCY: Primary | ICD-10-CM

## 2023-03-01 LAB
25(OH)D3 SERPL-MCNC: 38.6 NG/ML (ref 30–100)
ALBUMIN SERPL-MCNC: 3.8 G/DL (ref 3.5–5.2)
ALBUMIN/GLOB SERPL: 1.4 G/DL
ALP SERPL-CCNC: 77 U/L (ref 39–117)
ALT SERPL W P-5'-P-CCNC: 15 U/L (ref 1–33)
ANION GAP SERPL CALCULATED.3IONS-SCNC: 8.6 MMOL/L (ref 5–15)
AST SERPL-CCNC: 14 U/L (ref 1–32)
BILIRUB SERPL-MCNC: 0.4 MG/DL (ref 0–1.2)
BUN SERPL-MCNC: 7 MG/DL (ref 6–20)
BUN/CREAT SERPL: 12.1 (ref 7–25)
CALCIUM SPEC-SCNC: 9.5 MG/DL (ref 8.6–10.5)
CHLORIDE SERPL-SCNC: 103 MMOL/L (ref 98–107)
CO2 SERPL-SCNC: 24.4 MMOL/L (ref 22–29)
CREAT SERPL-MCNC: 0.58 MG/DL (ref 0.57–1)
EGFRCR SERPLBLD CKD-EPI 2021: 117.5 ML/MIN/1.73
FOLATE SERPL-MCNC: >20 NG/ML (ref 4.78–24.2)
GLOBULIN UR ELPH-MCNC: 2.8 GM/DL
GLUCOSE SERPL-MCNC: 114 MG/DL (ref 65–99)
MAGNESIUM SERPL-MCNC: 1.8 MG/DL (ref 1.6–2.6)
POTASSIUM SERPL-SCNC: 4 MMOL/L (ref 3.5–5.2)
PROT SERPL-MCNC: 6.6 G/DL (ref 6–8.5)
SODIUM SERPL-SCNC: 136 MMOL/L (ref 136–145)
VIT B12 BLD-MCNC: 438 PG/ML (ref 211–946)

## 2023-03-01 PROCEDURE — 83735 ASSAY OF MAGNESIUM: CPT | Performed by: OBSTETRICS & GYNECOLOGY

## 2023-03-01 PROCEDURE — 80053 COMPREHEN METABOLIC PANEL: CPT | Performed by: OBSTETRICS & GYNECOLOGY

## 2023-03-01 PROCEDURE — 82306 VITAMIN D 25 HYDROXY: CPT | Performed by: OBSTETRICS & GYNECOLOGY

## 2023-03-01 PROCEDURE — 82607 VITAMIN B-12: CPT | Performed by: OBSTETRICS & GYNECOLOGY

## 2023-03-01 PROCEDURE — 36415 COLL VENOUS BLD VENIPUNCTURE: CPT

## 2023-03-01 PROCEDURE — 96360 HYDRATION IV INFUSION INIT: CPT

## 2023-03-01 PROCEDURE — 82746 ASSAY OF FOLIC ACID SERUM: CPT | Performed by: OBSTETRICS & GYNECOLOGY

## 2023-03-01 RX ORDER — DEXTROSE AND SODIUM CHLORIDE 5; .9 G/100ML; G/100ML
1000 INJECTION, SOLUTION INTRAVENOUS WEEKLY
Status: DISCONTINUED | OUTPATIENT
Start: 2023-03-01 | End: 2023-03-03 | Stop reason: HOSPADM

## 2023-03-01 RX ORDER — DEXTROSE AND SODIUM CHLORIDE 5; .9 G/100ML; G/100ML
1000 INJECTION, SOLUTION INTRAVENOUS WEEKLY
Status: CANCELLED | OUTPATIENT
Start: 2023-03-08

## 2023-03-01 RX ADMIN — DEXTROSE AND SODIUM CHLORIDE 1000 ML/HR: 5; 900 INJECTION, SOLUTION INTRAVENOUS at 14:11

## 2023-03-01 NOTE — PROGRESS NOTES
Please notify:  Your labs are normal except your glucose was a little bit high, but I assume that you were not fasting and so it is a normal glucose value if you were not fasting.  Your electrolyte values, vitamin D, vitamin B12, magnesium levels were normal.    Ijeoma Briceno MD  3/1/2023  15:45 EST

## 2023-03-06 ENCOUNTER — HOSPITAL ENCOUNTER (OUTPATIENT)
Dept: INFUSION THERAPY | Facility: HOSPITAL | Age: 43
Discharge: HOME | End: 2023-03-06
Admitting: OBSTETRICS & GYNECOLOGY
Payer: COMMERCIAL

## 2023-03-06 VITALS
HEART RATE: 98 BPM | RESPIRATION RATE: 20 BRPM | OXYGEN SATURATION: 100 % | TEMPERATURE: 96.4 F | DIASTOLIC BLOOD PRESSURE: 86 MMHG | SYSTOLIC BLOOD PRESSURE: 129 MMHG

## 2023-03-06 DIAGNOSIS — E86.0 DEHYDRATION DURING PREGNANCY: Primary | ICD-10-CM

## 2023-03-06 DIAGNOSIS — O99.280 DEHYDRATION DURING PREGNANCY: Primary | ICD-10-CM

## 2023-03-06 LAB — GLUCOSE BLDC GLUCOMTR-MCNC: 188 MG/DL (ref 70–130)

## 2023-03-06 PROCEDURE — 96361 HYDRATE IV INFUSION ADD-ON: CPT

## 2023-03-06 PROCEDURE — 96360 HYDRATION IV INFUSION INIT: CPT

## 2023-03-06 PROCEDURE — 82962 GLUCOSE BLOOD TEST: CPT

## 2023-03-06 RX ORDER — DEXTROSE AND SODIUM CHLORIDE 5; .9 G/100ML; G/100ML
1000 INJECTION, SOLUTION INTRAVENOUS WEEKLY
Status: CANCELLED | OUTPATIENT
Start: 2023-03-13

## 2023-03-06 RX ORDER — DEXTROSE AND SODIUM CHLORIDE 5; .9 G/100ML; G/100ML
1000 INJECTION, SOLUTION INTRAVENOUS WEEKLY
Status: DISCONTINUED | OUTPATIENT
Start: 2023-03-06 | End: 2023-03-07

## 2023-03-06 RX ADMIN — DEXTROSE AND SODIUM CHLORIDE 600 ML/HR: 5; 900 INJECTION, SOLUTION INTRAVENOUS at 10:46

## 2023-03-07 ENCOUNTER — ROUTINE PRENATAL (OUTPATIENT)
Dept: OBSTETRICS AND GYNECOLOGY | Age: 43
End: 2023-03-07
Payer: COMMERCIAL

## 2023-03-07 VITALS — WEIGHT: 291.4 LBS | SYSTOLIC BLOOD PRESSURE: 126 MMHG | DIASTOLIC BLOOD PRESSURE: 70 MMHG | BODY MASS INDEX: 47.03 KG/M2

## 2023-03-07 DIAGNOSIS — Z34.80 SUPERVISION OF OTHER NORMAL PREGNANCY, ANTEPARTUM: ICD-10-CM

## 2023-03-07 DIAGNOSIS — Z13.89 SCREENING FOR BLOOD OR PROTEIN IN URINE: ICD-10-CM

## 2023-03-07 LAB
GLUCOSE UR STRIP-MCNC: NEGATIVE MG/DL
PROT UR STRIP-MCNC: ABNORMAL MG/DL

## 2023-03-07 PROCEDURE — 0502F SUBSEQUENT PRENATAL CARE: CPT | Performed by: OBSTETRICS & GYNECOLOGY

## 2023-03-07 RX ORDER — ONDANSETRON 4 MG/1
4 TABLET, ORALLY DISINTEGRATING ORAL EVERY 6 HOURS PRN
Qty: 50 TABLET | Refills: 1 | Status: SHIPPED | OUTPATIENT
Start: 2023-03-07

## 2023-03-07 RX ORDER — SODIUM CHLORIDE, SODIUM LACTATE, POTASSIUM CHLORIDE, CALCIUM CHLORIDE 600; 310; 30; 20 MG/100ML; MG/100ML; MG/100ML; MG/100ML
500 INJECTION, SOLUTION INTRAVENOUS 2 TIMES WEEKLY
Status: CANCELLED | OUTPATIENT
Start: 2023-03-07

## 2023-03-07 RX ORDER — METOCLOPRAMIDE 10 MG/1
10 TABLET ORAL EVERY 6 HOURS PRN
Qty: 50 TABLET | Refills: 1 | Status: SHIPPED | OUTPATIENT
Start: 2023-03-07

## 2023-03-07 NOTE — PROGRESS NOTES
Chief Complaint   Patient presents with   • Routine Prenatal Visit     Continued nausea, fatigue     Khadra Khan presents for prenatal visit at 14 weeks 6 days.  She notes that she continues to have nausea and difficulty with enough fluid and food intake.  She is not currently on any antiemetics.  She has gone to the hospital twice now and received IV fluids.  She notes that she actually felt worse after receiving the fluids, wonders if it may be due to the glucose and the fluids.  She has continued fatigue  Fetal heart rate 152 bpm on Doppler today    [facility]  Encouraged oral hydration.  She still has some ketones but less than last week.  Plan for twice weekly IV fluids until she is able to maintain her hydration status orally.  Genetic screening collected today    Follow-up in 1 week    Ijeoma Briceno MD  3/7/2023  12:49 EST

## 2023-03-13 ENCOUNTER — ROUTINE PRENATAL (OUTPATIENT)
Dept: OBSTETRICS AND GYNECOLOGY | Age: 43
End: 2023-03-13
Payer: COMMERCIAL

## 2023-03-13 VITALS — WEIGHT: 293 LBS | SYSTOLIC BLOOD PRESSURE: 122 MMHG | DIASTOLIC BLOOD PRESSURE: 78 MMHG | BODY MASS INDEX: 47.61 KG/M2

## 2023-03-13 DIAGNOSIS — Z13.89 SCREENING FOR BLOOD OR PROTEIN IN URINE: Primary | ICD-10-CM

## 2023-03-13 LAB
BILIRUB BLD-MCNC: ABNORMAL MG/DL
GLUCOSE UR STRIP-MCNC: NEGATIVE MG/DL
KETONES UR QL: ABNORMAL
LEUKOCYTE EST, POC: ABNORMAL
NITRITE UR-MCNC: NEGATIVE MG/ML
PH UR: 5.5 [PH] (ref 5–8)
PROT UR STRIP-MCNC: ABNORMAL MG/DL
RBC # UR STRIP: NEGATIVE /UL
SP GR UR: 1.03 (ref 1–1.03)
UROBILINOGEN UR QL: NORMAL

## 2023-03-13 PROCEDURE — 0502F SUBSEQUENT PRENATAL CARE: CPT | Performed by: NURSE PRACTITIONER

## 2023-03-13 RX ORDER — METFORMIN HYDROCHLORIDE 500 MG/1
500 TABLET, EXTENDED RELEASE ORAL
Qty: 90 TABLET | Refills: 3 | Status: SHIPPED | OUTPATIENT
Start: 2023-03-13

## 2023-03-13 NOTE — PROGRESS NOTES
Here for follow up 15w5d  Started reglan and zofran last week and that has helped tremendously- has only vomited a few times  Has IVFs scheduled tomorrow and later this week  She did experience some lower abdominal cramping and round ligament pain this weekend but it resolved  No bleeding or pain otherwise  FHT dopplered 150s with audible movements noted  Plan follow up 2 weeks or sooner if any concerns

## 2023-03-14 ENCOUNTER — HOSPITAL ENCOUNTER (OUTPATIENT)
Dept: INFUSION THERAPY | Facility: HOSPITAL | Age: 43
Discharge: HOME | End: 2023-03-14
Admitting: OBSTETRICS & GYNECOLOGY
Payer: COMMERCIAL

## 2023-03-14 VITALS
TEMPERATURE: 96.8 F | HEART RATE: 87 BPM | RESPIRATION RATE: 20 BRPM | SYSTOLIC BLOOD PRESSURE: 120 MMHG | OXYGEN SATURATION: 98 % | DIASTOLIC BLOOD PRESSURE: 64 MMHG

## 2023-03-14 DIAGNOSIS — E86.0 DEHYDRATION DURING PREGNANCY: Primary | ICD-10-CM

## 2023-03-14 DIAGNOSIS — O99.280 DEHYDRATION DURING PREGNANCY: Primary | ICD-10-CM

## 2023-03-14 LAB — GLUCOSE BLDC GLUCOMTR-MCNC: 109 MG/DL (ref 70–130)

## 2023-03-14 PROCEDURE — 82962 GLUCOSE BLOOD TEST: CPT

## 2023-03-14 PROCEDURE — 96361 HYDRATE IV INFUSION ADD-ON: CPT

## 2023-03-14 PROCEDURE — 96360 HYDRATION IV INFUSION INIT: CPT

## 2023-03-14 RX ORDER — SODIUM CHLORIDE, SODIUM LACTATE, POTASSIUM CHLORIDE, CALCIUM CHLORIDE 600; 310; 30; 20 MG/100ML; MG/100ML; MG/100ML; MG/100ML
500 INJECTION, SOLUTION INTRAVENOUS 2 TIMES WEEKLY
Status: CANCELLED | OUTPATIENT
Start: 2023-03-14

## 2023-03-14 RX ORDER — SODIUM CHLORIDE, SODIUM LACTATE, POTASSIUM CHLORIDE, CALCIUM CHLORIDE 600; 310; 30; 20 MG/100ML; MG/100ML; MG/100ML; MG/100ML
500 INJECTION, SOLUTION INTRAVENOUS 2 TIMES WEEKLY
Status: DISCONTINUED | OUTPATIENT
Start: 2023-03-14 | End: 2023-03-16 | Stop reason: HOSPADM

## 2023-03-14 RX ADMIN — SODIUM CHLORIDE, POTASSIUM CHLORIDE, SODIUM LACTATE AND CALCIUM CHLORIDE 500 ML/HR: 600; 310; 30; 20 INJECTION, SOLUTION INTRAVENOUS at 08:30

## 2023-03-16 ENCOUNTER — TELEPHONE (OUTPATIENT)
Dept: OBSTETRICS AND GYNECOLOGY | Age: 43
End: 2023-03-16
Payer: COMMERCIAL

## 2023-03-16 LAB
FET 13+18+21+X+Y ANEUP PLAS.CFDNA: NORMAL
REQUEST PROBLEM: NORMAL

## 2023-03-16 NOTE — TELEPHONE ENCOUNTER
Adam called to report that the lab lost the specimen for the [id number] PLUS core and will be reaching back out to the pt for redraw. Results will be delayed

## 2023-03-17 ENCOUNTER — HOSPITAL ENCOUNTER (OUTPATIENT)
Dept: INFUSION THERAPY | Facility: HOSPITAL | Age: 43
Discharge: HOME | End: 2023-03-17
Admitting: OBSTETRICS & GYNECOLOGY
Payer: COMMERCIAL

## 2023-03-17 VITALS
HEART RATE: 74 BPM | RESPIRATION RATE: 20 BRPM | OXYGEN SATURATION: 100 % | DIASTOLIC BLOOD PRESSURE: 63 MMHG | SYSTOLIC BLOOD PRESSURE: 117 MMHG | TEMPERATURE: 97.3 F

## 2023-03-17 DIAGNOSIS — O99.280 DEHYDRATION DURING PREGNANCY: Primary | ICD-10-CM

## 2023-03-17 DIAGNOSIS — E86.0 DEHYDRATION DURING PREGNANCY: Primary | ICD-10-CM

## 2023-03-17 PROCEDURE — 96361 HYDRATE IV INFUSION ADD-ON: CPT

## 2023-03-17 PROCEDURE — 96360 HYDRATION IV INFUSION INIT: CPT

## 2023-03-17 RX ORDER — SODIUM CHLORIDE, SODIUM LACTATE, POTASSIUM CHLORIDE, CALCIUM CHLORIDE 600; 310; 30; 20 MG/100ML; MG/100ML; MG/100ML; MG/100ML
500 INJECTION, SOLUTION INTRAVENOUS 2 TIMES WEEKLY
Status: CANCELLED | OUTPATIENT
Start: 2023-03-17

## 2023-03-17 RX ORDER — SODIUM CHLORIDE, SODIUM LACTATE, POTASSIUM CHLORIDE, CALCIUM CHLORIDE 600; 310; 30; 20 MG/100ML; MG/100ML; MG/100ML; MG/100ML
500 INJECTION, SOLUTION INTRAVENOUS 2 TIMES WEEKLY
Status: DISCONTINUED | OUTPATIENT
Start: 2023-03-17 | End: 2023-03-19 | Stop reason: HOSPADM

## 2023-03-17 RX ADMIN — SODIUM CHLORIDE, POTASSIUM CHLORIDE, SODIUM LACTATE AND CALCIUM CHLORIDE 500 ML/HR: 600; 310; 30; 20 INJECTION, SOLUTION INTRAVENOUS at 08:29

## 2023-03-20 ENCOUNTER — HOSPITAL ENCOUNTER (OUTPATIENT)
Dept: INFUSION THERAPY | Facility: HOSPITAL | Age: 43
Discharge: HOME | End: 2023-03-20
Admitting: OBSTETRICS & GYNECOLOGY
Payer: COMMERCIAL

## 2023-03-20 VITALS
TEMPERATURE: 97 F | DIASTOLIC BLOOD PRESSURE: 76 MMHG | RESPIRATION RATE: 20 BRPM | SYSTOLIC BLOOD PRESSURE: 131 MMHG | HEART RATE: 86 BPM | OXYGEN SATURATION: 100 %

## 2023-03-20 DIAGNOSIS — E86.0 DEHYDRATION DURING PREGNANCY: Primary | ICD-10-CM

## 2023-03-20 DIAGNOSIS — O99.280 DEHYDRATION DURING PREGNANCY: Primary | ICD-10-CM

## 2023-03-20 PROCEDURE — 96361 HYDRATE IV INFUSION ADD-ON: CPT

## 2023-03-20 PROCEDURE — 96360 HYDRATION IV INFUSION INIT: CPT

## 2023-03-20 RX ORDER — SODIUM CHLORIDE, SODIUM LACTATE, POTASSIUM CHLORIDE, CALCIUM CHLORIDE 600; 310; 30; 20 MG/100ML; MG/100ML; MG/100ML; MG/100ML
500 INJECTION, SOLUTION INTRAVENOUS 2 TIMES WEEKLY
Status: DISCONTINUED | OUTPATIENT
Start: 2023-03-20 | End: 2023-03-22 | Stop reason: HOSPADM

## 2023-03-20 RX ORDER — SODIUM CHLORIDE, SODIUM LACTATE, POTASSIUM CHLORIDE, CALCIUM CHLORIDE 600; 310; 30; 20 MG/100ML; MG/100ML; MG/100ML; MG/100ML
500 INJECTION, SOLUTION INTRAVENOUS 2 TIMES WEEKLY
Status: CANCELLED | OUTPATIENT
Start: 2023-03-20

## 2023-03-20 RX ADMIN — SODIUM CHLORIDE, POTASSIUM CHLORIDE, SODIUM LACTATE AND CALCIUM CHLORIDE 500 ML/HR: 600; 310; 30; 20 INJECTION, SOLUTION INTRAVENOUS at 08:26

## 2023-03-21 ENCOUNTER — ROUTINE PRENATAL (OUTPATIENT)
Dept: OBSTETRICS AND GYNECOLOGY | Age: 43
End: 2023-03-21
Payer: COMMERCIAL

## 2023-03-21 VITALS — WEIGHT: 293 LBS | DIASTOLIC BLOOD PRESSURE: 76 MMHG | BODY MASS INDEX: 47.65 KG/M2 | SYSTOLIC BLOOD PRESSURE: 124 MMHG

## 2023-03-21 DIAGNOSIS — Z13.89 SCREENING FOR BLOOD OR PROTEIN IN URINE: Primary | ICD-10-CM

## 2023-03-21 DIAGNOSIS — Z34.80 SUPERVISION OF OTHER NORMAL PREGNANCY, ANTEPARTUM: ICD-10-CM

## 2023-03-21 LAB
BILIRUB BLD-MCNC: ABNORMAL MG/DL
GLUCOSE UR STRIP-MCNC: NEGATIVE MG/DL
KETONES UR QL: ABNORMAL
LEUKOCYTE EST, POC: ABNORMAL
NITRITE UR-MCNC: NEGATIVE MG/ML
PH UR: 7 [PH] (ref 5–8)
PROT UR STRIP-MCNC: ABNORMAL MG/DL
RBC # UR STRIP: NEGATIVE /UL
SP GR UR: 1.02 (ref 1–1.03)
UROBILINOGEN UR QL: ABNORMAL

## 2023-03-21 PROCEDURE — 0502F SUBSEQUENT PRENATAL CARE: CPT | Performed by: OBSTETRICS & GYNECOLOGY

## 2023-03-21 NOTE — PROGRESS NOTES
Chief Complaint   Patient presents with   • Routine Prenatal Visit     16w6d OB Check      Khadra Khan notes that she continues to feel better but still goes for IV fluids twice a week. Is tolerating more PO and the antiemetics are helping.   She complaints of some vague left lateral abd pain. Does not seen c/w nephrolithiasis. Does have some constipation and started miralax, advised can use up to TID if needed. Will follow up on this pain   on doppler today  Unfortunately NIPT sample was lost on way from [facility] to outside lab for testing, this was recollected today    FU 1 wk    Ijeoma Briceno MD  3/21/2023  12:39 EDT

## 2023-03-23 ENCOUNTER — HOSPITAL ENCOUNTER (OUTPATIENT)
Dept: INFUSION THERAPY | Facility: HOSPITAL | Age: 43
Discharge: HOME | End: 2023-03-23
Admitting: OBSTETRICS & GYNECOLOGY
Payer: COMMERCIAL

## 2023-03-23 VITALS
RESPIRATION RATE: 20 BRPM | OXYGEN SATURATION: 100 % | TEMPERATURE: 97.7 F | HEART RATE: 90 BPM | SYSTOLIC BLOOD PRESSURE: 117 MMHG | DIASTOLIC BLOOD PRESSURE: 77 MMHG

## 2023-03-23 DIAGNOSIS — E86.0 DEHYDRATION DURING PREGNANCY: Primary | ICD-10-CM

## 2023-03-23 DIAGNOSIS — O99.280 DEHYDRATION DURING PREGNANCY: Primary | ICD-10-CM

## 2023-03-23 PROCEDURE — 96360 HYDRATION IV INFUSION INIT: CPT

## 2023-03-23 PROCEDURE — 96361 HYDRATE IV INFUSION ADD-ON: CPT

## 2023-03-23 RX ORDER — SODIUM CHLORIDE, SODIUM LACTATE, POTASSIUM CHLORIDE, CALCIUM CHLORIDE 600; 310; 30; 20 MG/100ML; MG/100ML; MG/100ML; MG/100ML
500 INJECTION, SOLUTION INTRAVENOUS 2 TIMES WEEKLY
Status: DISCONTINUED | OUTPATIENT
Start: 2023-03-23 | End: 2023-03-25 | Stop reason: HOSPADM

## 2023-03-23 RX ORDER — SODIUM CHLORIDE, SODIUM LACTATE, POTASSIUM CHLORIDE, CALCIUM CHLORIDE 600; 310; 30; 20 MG/100ML; MG/100ML; MG/100ML; MG/100ML
500 INJECTION, SOLUTION INTRAVENOUS 2 TIMES WEEKLY
Status: CANCELLED | OUTPATIENT
Start: 2023-03-23

## 2023-03-23 RX ADMIN — SODIUM CHLORIDE, POTASSIUM CHLORIDE, SODIUM LACTATE AND CALCIUM CHLORIDE 500 ML/HR: 600; 310; 30; 20 INJECTION, SOLUTION INTRAVENOUS at 08:19

## 2023-03-27 ENCOUNTER — HOSPITAL ENCOUNTER (OUTPATIENT)
Dept: INFUSION THERAPY | Facility: HOSPITAL | Age: 43
Discharge: HOME | End: 2023-03-27
Admitting: OBSTETRICS & GYNECOLOGY
Payer: COMMERCIAL

## 2023-03-27 VITALS
DIASTOLIC BLOOD PRESSURE: 70 MMHG | SYSTOLIC BLOOD PRESSURE: 106 MMHG | OXYGEN SATURATION: 99 % | TEMPERATURE: 97.1 F | HEART RATE: 89 BPM | RESPIRATION RATE: 20 BRPM

## 2023-03-27 DIAGNOSIS — O99.280 DEHYDRATION DURING PREGNANCY: Primary | ICD-10-CM

## 2023-03-27 DIAGNOSIS — E86.0 DEHYDRATION DURING PREGNANCY: Primary | ICD-10-CM

## 2023-03-27 PROCEDURE — 96360 HYDRATION IV INFUSION INIT: CPT

## 2023-03-27 PROCEDURE — 96361 HYDRATE IV INFUSION ADD-ON: CPT

## 2023-03-27 RX ORDER — SODIUM CHLORIDE, SODIUM LACTATE, POTASSIUM CHLORIDE, CALCIUM CHLORIDE 600; 310; 30; 20 MG/100ML; MG/100ML; MG/100ML; MG/100ML
500 INJECTION, SOLUTION INTRAVENOUS 2 TIMES WEEKLY
Status: DISCONTINUED | OUTPATIENT
Start: 2023-03-27 | End: 2023-03-29 | Stop reason: HOSPADM

## 2023-03-27 RX ORDER — SODIUM CHLORIDE, SODIUM LACTATE, POTASSIUM CHLORIDE, CALCIUM CHLORIDE 600; 310; 30; 20 MG/100ML; MG/100ML; MG/100ML; MG/100ML
500 INJECTION, SOLUTION INTRAVENOUS 2 TIMES WEEKLY
Status: CANCELLED | OUTPATIENT
Start: 2023-03-27

## 2023-03-27 RX ADMIN — SODIUM CHLORIDE, POTASSIUM CHLORIDE, SODIUM LACTATE AND CALCIUM CHLORIDE 500 ML/HR: 600; 310; 30; 20 INJECTION, SOLUTION INTRAVENOUS at 08:30

## 2023-03-28 ENCOUNTER — ROUTINE PRENATAL (OUTPATIENT)
Dept: OBSTETRICS AND GYNECOLOGY | Age: 43
End: 2023-03-28
Payer: COMMERCIAL

## 2023-03-28 VITALS — SYSTOLIC BLOOD PRESSURE: 126 MMHG | DIASTOLIC BLOOD PRESSURE: 70 MMHG | WEIGHT: 293 LBS | BODY MASS INDEX: 47.71 KG/M2

## 2023-03-28 DIAGNOSIS — Z13.89 SCREENING FOR BLOOD OR PROTEIN IN URINE: Primary | ICD-10-CM

## 2023-03-28 DIAGNOSIS — Z34.80 SUPERVISION OF OTHER NORMAL PREGNANCY, ANTEPARTUM: ICD-10-CM

## 2023-03-28 DIAGNOSIS — Z90.3 HISTORY OF SLEEVE GASTRECTOMY: ICD-10-CM

## 2023-03-28 DIAGNOSIS — O99.280 DEHYDRATION DURING PREGNANCY: ICD-10-CM

## 2023-03-28 DIAGNOSIS — E86.0 DEHYDRATION DURING PREGNANCY: ICD-10-CM

## 2023-03-28 DIAGNOSIS — E03.8 SUBCLINICAL HYPOTHYROIDISM: ICD-10-CM

## 2023-03-28 LAB
BILIRUB BLD-MCNC: ABNORMAL MG/DL
GLUCOSE UR STRIP-MCNC: NEGATIVE MG/DL
GLUCOSE UR STRIP-MCNC: NEGATIVE MG/DL
KETONES UR QL: ABNORMAL
LEUKOCYTE EST, POC: NEGATIVE
NITRITE UR-MCNC: NEGATIVE MG/ML
PH UR: 5.5 [PH] (ref 5–8)
PROT UR STRIP-MCNC: ABNORMAL MG/DL
PROT UR STRIP-MCNC: ABNORMAL MG/DL
RBC # UR STRIP: NEGATIVE /UL
SP GR UR: 1.03 (ref 1–1.03)
UROBILINOGEN UR QL: ABNORMAL

## 2023-03-28 RX ORDER — LEVOTHYROXINE SODIUM 0.07 MG/1
75 TABLET ORAL DAILY
Qty: 90 TABLET | Refills: 3 | Status: SHIPPED | OUTPATIENT
Start: 2023-03-28

## 2023-03-28 RX ORDER — PANTOPRAZOLE SODIUM 40 MG/1
40 TABLET, DELAYED RELEASE ORAL DAILY
Qty: 90 TABLET | Refills: 3 | Status: SHIPPED | OUTPATIENT
Start: 2023-03-28

## 2023-03-28 NOTE — PROGRESS NOTES
Chief Complaint   Patient presents with   • Routine Prenatal Visit     17w6d OB Check      Khadra Khan notes that she has not had much at all to drink today, and hence the large ketones on her urinalysis.  I again encouraged her to drink frequent, small amounts throughout the day as her blood volume is expanding quite a bit and she needs lots of extra fluids.  She did go for IV fluids yesterday.  She had a headache over the weekend but it resolved.  Her nausea does come and go but has overall improved.  She is not yet feeling fetal movement  Needs refill on her Synthroid, it was started due to slightly elevated TSH in the context of IVF.  Plan to continue the Synthroid.    Normal fetal heart rate on ultrasound  Anatomy and AFP next visit  NIPT returned as low risk    Follow-up in 2 to 3 weeks    Ijeoma Briceno MD  3/28/2023  16:12 EDT

## 2023-03-29 ENCOUNTER — PATIENT MESSAGE (OUTPATIENT)
Dept: OBSTETRICS AND GYNECOLOGY | Age: 43
End: 2023-03-29
Payer: COMMERCIAL

## 2023-03-30 ENCOUNTER — HOSPITAL ENCOUNTER (OUTPATIENT)
Dept: INFUSION THERAPY | Facility: HOSPITAL | Age: 43
Discharge: HOME | End: 2023-03-30
Admitting: OBSTETRICS & GYNECOLOGY
Payer: COMMERCIAL

## 2023-03-30 VITALS
DIASTOLIC BLOOD PRESSURE: 74 MMHG | HEART RATE: 93 BPM | TEMPERATURE: 96.6 F | SYSTOLIC BLOOD PRESSURE: 117 MMHG | RESPIRATION RATE: 20 BRPM | OXYGEN SATURATION: 98 %

## 2023-03-30 DIAGNOSIS — O99.280 DEHYDRATION DURING PREGNANCY: Primary | ICD-10-CM

## 2023-03-30 DIAGNOSIS — E86.0 DEHYDRATION DURING PREGNANCY: Primary | ICD-10-CM

## 2023-03-30 PROCEDURE — 96361 HYDRATE IV INFUSION ADD-ON: CPT

## 2023-03-30 PROCEDURE — 96360 HYDRATION IV INFUSION INIT: CPT

## 2023-03-30 RX ORDER — SODIUM CHLORIDE, SODIUM LACTATE, POTASSIUM CHLORIDE, CALCIUM CHLORIDE 600; 310; 30; 20 MG/100ML; MG/100ML; MG/100ML; MG/100ML
500 INJECTION, SOLUTION INTRAVENOUS 2 TIMES WEEKLY
Status: DISCONTINUED | OUTPATIENT
Start: 2023-03-30 | End: 2023-04-01 | Stop reason: HOSPADM

## 2023-03-30 RX ORDER — SODIUM CHLORIDE, SODIUM LACTATE, POTASSIUM CHLORIDE, CALCIUM CHLORIDE 600; 310; 30; 20 MG/100ML; MG/100ML; MG/100ML; MG/100ML
500 INJECTION, SOLUTION INTRAVENOUS 2 TIMES WEEKLY
Status: CANCELLED | OUTPATIENT
Start: 2023-03-30

## 2023-03-30 RX ADMIN — SODIUM CHLORIDE, POTASSIUM CHLORIDE, SODIUM LACTATE AND CALCIUM CHLORIDE 500 ML/HR: 600; 310; 30; 20 INJECTION, SOLUTION INTRAVENOUS at 08:53

## 2023-03-30 NOTE — TELEPHONE ENCOUNTER
From: Khadra Khan  To: Ijeoma Briceno  Sent: 3/29/2023 9:38 AM EDT  Subject: High Ketones    Good morning Dr. Briceno,  I read a few articles that mention having high ketones during pregnancy can lead to poor brain development and cognitive issues for the baby. Am I putting him at risk?

## 2023-04-04 ENCOUNTER — ROUTINE PRENATAL (OUTPATIENT)
Dept: OBSTETRICS AND GYNECOLOGY | Age: 43
End: 2023-04-04
Payer: COMMERCIAL

## 2023-04-04 ENCOUNTER — HOSPITAL ENCOUNTER (OUTPATIENT)
Dept: CARDIOLOGY | Facility: HOSPITAL | Age: 43
Discharge: HOME | End: 2023-04-04
Payer: COMMERCIAL

## 2023-04-04 ENCOUNTER — HOSPITAL ENCOUNTER (OUTPATIENT)
Dept: INFUSION THERAPY | Facility: HOSPITAL | Age: 43
Discharge: HOME | End: 2023-04-04
Payer: COMMERCIAL

## 2023-04-04 VITALS
OXYGEN SATURATION: 97 % | RESPIRATION RATE: 20 BRPM | HEART RATE: 83 BPM | DIASTOLIC BLOOD PRESSURE: 67 MMHG | TEMPERATURE: 96.7 F | SYSTOLIC BLOOD PRESSURE: 120 MMHG

## 2023-04-04 VITALS — SYSTOLIC BLOOD PRESSURE: 126 MMHG | BODY MASS INDEX: 48.94 KG/M2 | WEIGHT: 293 LBS | DIASTOLIC BLOOD PRESSURE: 78 MMHG

## 2023-04-04 DIAGNOSIS — R60.0 BILATERAL LOWER EXTREMITY EDEMA: ICD-10-CM

## 2023-04-04 DIAGNOSIS — O09.92 SUPERVISION OF HIGH RISK PREGNANCY IN SECOND TRIMESTER: ICD-10-CM

## 2023-04-04 DIAGNOSIS — O99.280 DEHYDRATION DURING PREGNANCY: Primary | ICD-10-CM

## 2023-04-04 DIAGNOSIS — M79.661 BILATERAL CALF PAIN: ICD-10-CM

## 2023-04-04 DIAGNOSIS — M79.662 BILATERAL CALF PAIN: Primary | ICD-10-CM

## 2023-04-04 DIAGNOSIS — Z13.89 SCREENING FOR BLOOD OR PROTEIN IN URINE: Primary | ICD-10-CM

## 2023-04-04 DIAGNOSIS — M79.662 BILATERAL CALF PAIN: ICD-10-CM

## 2023-04-04 DIAGNOSIS — E86.0 DEHYDRATION DURING PREGNANCY: Primary | ICD-10-CM

## 2023-04-04 DIAGNOSIS — O09.92 HIGH-RISK PREGNANCY IN SECOND TRIMESTER: ICD-10-CM

## 2023-04-04 DIAGNOSIS — M79.661 BILATERAL CALF PAIN: Primary | ICD-10-CM

## 2023-04-04 LAB
BH CV LOWER VASCULAR LEFT COMMON FEMORAL AUGMENT: NORMAL
BH CV LOWER VASCULAR LEFT COMMON FEMORAL COMPETENT: NORMAL
BH CV LOWER VASCULAR LEFT COMMON FEMORAL COMPRESS: NORMAL
BH CV LOWER VASCULAR LEFT COMMON FEMORAL PHASIC: NORMAL
BH CV LOWER VASCULAR LEFT COMMON FEMORAL SPONT: NORMAL
BH CV LOWER VASCULAR LEFT DISTAL FEMORAL COMPRESS: NORMAL
BH CV LOWER VASCULAR LEFT GASTRONEMIUS COMPRESS: NORMAL
BH CV LOWER VASCULAR LEFT GREATER SAPH AK COMPRESS: NORMAL
BH CV LOWER VASCULAR LEFT GREATER SAPH BK COMPRESS: NORMAL
BH CV LOWER VASCULAR LEFT LESSER SAPH COMPRESS: NORMAL
BH CV LOWER VASCULAR LEFT MID FEMORAL AUGMENT: NORMAL
BH CV LOWER VASCULAR LEFT MID FEMORAL COMPETENT: NORMAL
BH CV LOWER VASCULAR LEFT MID FEMORAL COMPRESS: NORMAL
BH CV LOWER VASCULAR LEFT MID FEMORAL PHASIC: NORMAL
BH CV LOWER VASCULAR LEFT MID FEMORAL SPONT: NORMAL
BH CV LOWER VASCULAR LEFT PERONEAL COMPRESS: NORMAL
BH CV LOWER VASCULAR LEFT POPLITEAL AUGMENT: NORMAL
BH CV LOWER VASCULAR LEFT POPLITEAL COMPETENT: NORMAL
BH CV LOWER VASCULAR LEFT POPLITEAL COMPRESS: NORMAL
BH CV LOWER VASCULAR LEFT POPLITEAL PHASIC: NORMAL
BH CV LOWER VASCULAR LEFT POPLITEAL SPONT: NORMAL
BH CV LOWER VASCULAR LEFT POSTERIOR TIBIAL COMPRESS: NORMAL
BH CV LOWER VASCULAR LEFT PROFUNDA FEMORAL COMPRESS: NORMAL
BH CV LOWER VASCULAR LEFT PROXIMAL FEMORAL COMPRESS: NORMAL
BH CV LOWER VASCULAR LEFT SAPHENOFEMORAL JUNCTION COMPRESS: NORMAL
BH CV LOWER VASCULAR RIGHT COMMON FEMORAL AUGMENT: NORMAL
BH CV LOWER VASCULAR RIGHT COMMON FEMORAL COMPETENT: NORMAL
BH CV LOWER VASCULAR RIGHT COMMON FEMORAL COMPRESS: NORMAL
BH CV LOWER VASCULAR RIGHT COMMON FEMORAL PHASIC: NORMAL
BH CV LOWER VASCULAR RIGHT COMMON FEMORAL SPONT: NORMAL
BH CV LOWER VASCULAR RIGHT DISTAL FEMORAL COMPRESS: NORMAL
BH CV LOWER VASCULAR RIGHT GASTRONEMIUS COMPRESS: NORMAL
BH CV LOWER VASCULAR RIGHT GREATER SAPH AK COMPRESS: NORMAL
BH CV LOWER VASCULAR RIGHT GREATER SAPH BK COMPRESS: NORMAL
BH CV LOWER VASCULAR RIGHT LESSER SAPH COMPRESS: NORMAL
BH CV LOWER VASCULAR RIGHT MID FEMORAL AUGMENT: NORMAL
BH CV LOWER VASCULAR RIGHT MID FEMORAL COMPETENT: NORMAL
BH CV LOWER VASCULAR RIGHT MID FEMORAL COMPRESS: NORMAL
BH CV LOWER VASCULAR RIGHT MID FEMORAL PHASIC: NORMAL
BH CV LOWER VASCULAR RIGHT MID FEMORAL SPONT: NORMAL
BH CV LOWER VASCULAR RIGHT PERONEAL COMPRESS: NORMAL
BH CV LOWER VASCULAR RIGHT POPLITEAL AUGMENT: NORMAL
BH CV LOWER VASCULAR RIGHT POPLITEAL COMPETENT: NORMAL
BH CV LOWER VASCULAR RIGHT POPLITEAL COMPRESS: NORMAL
BH CV LOWER VASCULAR RIGHT POPLITEAL PHASIC: NORMAL
BH CV LOWER VASCULAR RIGHT POPLITEAL SPONT: NORMAL
BH CV LOWER VASCULAR RIGHT POSTERIOR TIBIAL COMPRESS: NORMAL
BH CV LOWER VASCULAR RIGHT PROFUNDA FEMORAL COMPRESS: NORMAL
BH CV LOWER VASCULAR RIGHT PROXIMAL FEMORAL COMPRESS: NORMAL
BH CV LOWER VASCULAR RIGHT SAPHENOFEMORAL JUNCTION COMPRESS: NORMAL
BILIRUB BLD-MCNC: NEGATIVE MG/DL
GLUCOSE UR STRIP-MCNC: NEGATIVE MG/DL
KETONES UR QL: NEGATIVE
LEUKOCYTE EST, POC: ABNORMAL
MAXIMAL PREDICTED HEART RATE: 180 BPM
NITRITE UR-MCNC: NEGATIVE MG/ML
PH UR: 6.5 [PH] (ref 5–8)
PROT UR STRIP-MCNC: NEGATIVE MG/DL
RBC # UR STRIP: NEGATIVE /UL
SP GR UR: 1.03 (ref 1–1.03)
STRESS TARGET HR: 153 BPM
UROBILINOGEN UR QL: ABNORMAL

## 2023-04-04 PROCEDURE — 96361 HYDRATE IV INFUSION ADD-ON: CPT

## 2023-04-04 PROCEDURE — 93970 EXTREMITY STUDY: CPT

## 2023-04-04 PROCEDURE — 81002 URINALYSIS NONAUTO W/O SCOPE: CPT | Performed by: OBSTETRICS & GYNECOLOGY

## 2023-04-04 PROCEDURE — 0502F SUBSEQUENT PRENATAL CARE: CPT | Performed by: OBSTETRICS & GYNECOLOGY

## 2023-04-04 PROCEDURE — 96360 HYDRATION IV INFUSION INIT: CPT

## 2023-04-04 RX ORDER — SODIUM CHLORIDE, SODIUM LACTATE, POTASSIUM CHLORIDE, CALCIUM CHLORIDE 600; 310; 30; 20 MG/100ML; MG/100ML; MG/100ML; MG/100ML
500 INJECTION, SOLUTION INTRAVENOUS 2 TIMES WEEKLY
Status: CANCELLED | OUTPATIENT
Start: 2023-04-04

## 2023-04-04 RX ORDER — SODIUM CHLORIDE, SODIUM LACTATE, POTASSIUM CHLORIDE, CALCIUM CHLORIDE 600; 310; 30; 20 MG/100ML; MG/100ML; MG/100ML; MG/100ML
500 INJECTION, SOLUTION INTRAVENOUS 2 TIMES WEEKLY
Status: DISCONTINUED | OUTPATIENT
Start: 2023-04-04 | End: 2023-04-06 | Stop reason: HOSPADM

## 2023-04-04 RX ADMIN — SODIUM CHLORIDE, POTASSIUM CHLORIDE, SODIUM LACTATE AND CALCIUM CHLORIDE 500 ML/HR: 600; 310; 30; 20 INJECTION, SOLUTION INTRAVENOUS at 08:30

## 2023-04-04 NOTE — PROGRESS NOTES
Patient c/o tightness and uncomfortable in both calves. Right foot and ankle more swollen than left. Both calves feel tight to touch. Dr. Briceno notified and she is ordering dopplers.

## 2023-04-04 NOTE — PROGRESS NOTES
Chief Complaint   Patient presents with   • Routine Prenatal Visit     18w6d OB Check      Khadra Khan came in this week to recheck her ketones.  She has added a daily Gatorade to her regimen and she also had IV fluids today.  She notes she has been drinking more.  Ketones today are negative  Normal fetal heart rate on Doppler    When she was in the infusion center, she complained of some lower extremity swelling and calf tightness.  I obtain venous Dopplers which were normal, no superficial or deep venous thrombosis.  She does have some trace edema of her shins, likely because she is now well-hydrated.  Blood pressure is normal today, no protein    Follow-up next week with anatomy and early glucose screen    Ijeoma Briceno MD  4/4/2023  15:47 EDT

## 2023-04-07 ENCOUNTER — HOSPITAL ENCOUNTER (OUTPATIENT)
Dept: INFUSION THERAPY | Facility: HOSPITAL | Age: 43
Discharge: HOME | End: 2023-04-07
Admitting: OBSTETRICS & GYNECOLOGY
Payer: COMMERCIAL

## 2023-04-07 VITALS
RESPIRATION RATE: 20 BRPM | DIASTOLIC BLOOD PRESSURE: 74 MMHG | HEART RATE: 97 BPM | SYSTOLIC BLOOD PRESSURE: 125 MMHG | OXYGEN SATURATION: 99 % | TEMPERATURE: 96.8 F

## 2023-04-07 DIAGNOSIS — O99.280 DEHYDRATION DURING PREGNANCY: Primary | ICD-10-CM

## 2023-04-07 DIAGNOSIS — E86.0 DEHYDRATION DURING PREGNANCY: Primary | ICD-10-CM

## 2023-04-07 PROCEDURE — 96360 HYDRATION IV INFUSION INIT: CPT

## 2023-04-07 PROCEDURE — 96361 HYDRATE IV INFUSION ADD-ON: CPT

## 2023-04-07 RX ORDER — SODIUM CHLORIDE, SODIUM LACTATE, POTASSIUM CHLORIDE, CALCIUM CHLORIDE 600; 310; 30; 20 MG/100ML; MG/100ML; MG/100ML; MG/100ML
500 INJECTION, SOLUTION INTRAVENOUS 2 TIMES WEEKLY
Status: DISCONTINUED | OUTPATIENT
Start: 2023-04-07 | End: 2023-04-09 | Stop reason: HOSPADM

## 2023-04-07 RX ORDER — SODIUM CHLORIDE, SODIUM LACTATE, POTASSIUM CHLORIDE, CALCIUM CHLORIDE 600; 310; 30; 20 MG/100ML; MG/100ML; MG/100ML; MG/100ML
500 INJECTION, SOLUTION INTRAVENOUS 2 TIMES WEEKLY
Status: CANCELLED | OUTPATIENT
Start: 2023-04-07

## 2023-04-07 RX ADMIN — SODIUM CHLORIDE, POTASSIUM CHLORIDE, SODIUM LACTATE AND CALCIUM CHLORIDE 500 ML/HR: 600; 310; 30; 20 INJECTION, SOLUTION INTRAVENOUS at 14:06

## 2023-04-11 ENCOUNTER — ROUTINE PRENATAL (OUTPATIENT)
Dept: OBSTETRICS AND GYNECOLOGY | Age: 43
End: 2023-04-11
Payer: COMMERCIAL

## 2023-04-11 VITALS — DIASTOLIC BLOOD PRESSURE: 72 MMHG | WEIGHT: 293 LBS | SYSTOLIC BLOOD PRESSURE: 124 MMHG | BODY MASS INDEX: 47.71 KG/M2

## 2023-04-11 DIAGNOSIS — E03.8 SUBCLINICAL HYPOTHYROIDISM: Primary | ICD-10-CM

## 2023-04-11 DIAGNOSIS — Z13.0 SCREENING FOR IRON DEFICIENCY ANEMIA: ICD-10-CM

## 2023-04-11 DIAGNOSIS — O09.92 HIGH-RISK PREGNANCY IN SECOND TRIMESTER: ICD-10-CM

## 2023-04-11 DIAGNOSIS — Z13.1 SCREENING FOR DIABETES MELLITUS: ICD-10-CM

## 2023-04-11 DIAGNOSIS — Z13.89 SCREENING FOR BLOOD OR PROTEIN IN URINE: ICD-10-CM

## 2023-04-11 LAB
BILIRUB BLD-MCNC: ABNORMAL MG/DL
GLUCOSE UR STRIP-MCNC: NEGATIVE MG/DL
KETONES UR QL: ABNORMAL
LEUKOCYTE EST, POC: NEGATIVE
NITRITE UR-MCNC: NEGATIVE MG/ML
PH UR: 5.5 [PH] (ref 5–8)
PROT UR STRIP-MCNC: ABNORMAL MG/DL
RBC # UR STRIP: NEGATIVE /UL
SP GR UR: 1.03 (ref 1–1.03)
UROBILINOGEN UR QL: NORMAL

## 2023-04-11 PROCEDURE — 0502F SUBSEQUENT PRENATAL CARE: CPT | Performed by: OBSTETRICS & GYNECOLOGY

## 2023-04-11 PROCEDURE — 81002 URINALYSIS NONAUTO W/O SCOPE: CPT | Performed by: OBSTETRICS & GYNECOLOGY

## 2023-04-11 NOTE — PROGRESS NOTES
Chief Complaint   Patient presents with   • Routine Prenatal Visit     19w6d OB Check, 1 HR GTT, Anatomy US yesterday     Khadra Khan is overall doing well, just a little queasy after glucose today. Feeling some fetal movement. She has not had a lot to drink today, has some ketones, encouraged her to have a pedialyte or gatorate today.  Anatomy yesterday normal but incomplete, needs FU views 4 wks  Repeat TSH with AFP Today    FU 2 wks    Ijeoma Briceno MD  4/11/2023  09:16 EDT

## 2023-04-12 ENCOUNTER — HOSPITAL ENCOUNTER (OUTPATIENT)
Dept: INFUSION THERAPY | Facility: HOSPITAL | Age: 43
Discharge: HOME | End: 2023-04-12
Admitting: OBSTETRICS & GYNECOLOGY
Payer: COMMERCIAL

## 2023-04-12 VITALS
RESPIRATION RATE: 20 BRPM | HEART RATE: 90 BPM | SYSTOLIC BLOOD PRESSURE: 115 MMHG | TEMPERATURE: 96.8 F | OXYGEN SATURATION: 98 % | DIASTOLIC BLOOD PRESSURE: 56 MMHG

## 2023-04-12 DIAGNOSIS — E86.0 DEHYDRATION DURING PREGNANCY: Primary | ICD-10-CM

## 2023-04-12 DIAGNOSIS — O99.280 DEHYDRATION DURING PREGNANCY: Primary | ICD-10-CM

## 2023-04-12 PROCEDURE — 96360 HYDRATION IV INFUSION INIT: CPT

## 2023-04-12 PROCEDURE — 96361 HYDRATE IV INFUSION ADD-ON: CPT

## 2023-04-12 RX ORDER — SODIUM CHLORIDE, SODIUM LACTATE, POTASSIUM CHLORIDE, CALCIUM CHLORIDE 600; 310; 30; 20 MG/100ML; MG/100ML; MG/100ML; MG/100ML
500 INJECTION, SOLUTION INTRAVENOUS 2 TIMES WEEKLY
Status: CANCELLED | OUTPATIENT
Start: 2023-04-12

## 2023-04-12 RX ORDER — SODIUM CHLORIDE, SODIUM LACTATE, POTASSIUM CHLORIDE, CALCIUM CHLORIDE 600; 310; 30; 20 MG/100ML; MG/100ML; MG/100ML; MG/100ML
500 INJECTION, SOLUTION INTRAVENOUS 2 TIMES WEEKLY
Status: DISCONTINUED | OUTPATIENT
Start: 2023-04-12 | End: 2023-04-14 | Stop reason: HOSPADM

## 2023-04-12 RX ADMIN — SODIUM CHLORIDE, POTASSIUM CHLORIDE, SODIUM LACTATE AND CALCIUM CHLORIDE 500 ML/HR: 600; 310; 30; 20 INJECTION, SOLUTION INTRAVENOUS at 08:26

## 2023-04-13 DIAGNOSIS — O24.410 GDM (GESTATIONAL DIABETES MELLITUS), CLASS A1: Primary | ICD-10-CM

## 2023-04-13 LAB
AFP INTERP SERPL-IMP: NORMAL
AFP INTERP SERPL-IMP: NORMAL
AFP MOM SERPL: 1.56
AFP SERPL-MCNC: 61.2 NG/ML
AGE AT DELIVERY: 40.6 YR
BASOPHILS # BLD AUTO: 0.03 10*3/MM3 (ref 0–0.2)
BASOPHILS NFR BLD AUTO: 0.3 % (ref 0–1.5)
BUN SERPL-MCNC: NORMAL MG/DL
CALCIUM SERPL-MCNC: NORMAL MG/DL
CHLORIDE SERPL-SCNC: NORMAL MMOL/L
CO2 SERPL-SCNC: NORMAL MMOL/L
CREAT SERPL-MCNC: NORMAL MG/DL
EOSINOPHIL # BLD AUTO: 0.03 10*3/MM3 (ref 0–0.4)
EOSINOPHIL NFR BLD AUTO: 0.3 % (ref 0.3–6.2)
ERYTHROCYTE [DISTWIDTH] IN BLOOD BY AUTOMATED COUNT: 13.1 % (ref 12.3–15.4)
GA METHOD: NORMAL
GA: 19.9 WEEKS
GLUCOSE 1H P 50 G GLC PO SERPL-MCNC: 192 MG/DL (ref 65–139)
GLUCOSE SERPL-MCNC: NORMAL MG/DL
HCT VFR BLD AUTO: 36.4 % (ref 34–46.6)
HGB BLD-MCNC: 12.2 G/DL (ref 12–15.9)
IDDM PATIENT QL: NO
IMM GRANULOCYTES # BLD AUTO: 0.03 10*3/MM3 (ref 0–0.05)
IMM GRANULOCYTES NFR BLD AUTO: 0.3 % (ref 0–0.5)
LABORATORY COMMENT REPORT: NORMAL
LYMPHOCYTES # BLD AUTO: 1.41 10*3/MM3 (ref 0.7–3.1)
LYMPHOCYTES NFR BLD AUTO: 15.3 % (ref 19.6–45.3)
MCH RBC QN AUTO: 29.3 PG (ref 26.6–33)
MCHC RBC AUTO-ENTMCNC: 33.5 G/DL (ref 31.5–35.7)
MCV RBC AUTO: 87.3 FL (ref 79–97)
MONOCYTES # BLD AUTO: 0.26 10*3/MM3 (ref 0.1–0.9)
MONOCYTES NFR BLD AUTO: 2.8 % (ref 5–12)
MULTIPLE PREGNANCY: NO
NEURAL TUBE DEFECT RISK FETUS: 2331 %
NEUTROPHILS # BLD AUTO: 7.45 10*3/MM3 (ref 1.7–7)
NEUTROPHILS NFR BLD AUTO: 81 % (ref 42.7–76)
NRBC BLD AUTO-RTO: 0 /100 WBC (ref 0–0.2)
PLATELET # BLD AUTO: 301 10*3/MM3 (ref 140–450)
POTASSIUM SERPL-SCNC: NORMAL MMOL/L
RBC # BLD AUTO: 4.17 10*6/MM3 (ref 3.77–5.28)
REQUEST PROBLEM: NORMAL
RESULT: NORMAL
SODIUM SERPL-SCNC: NORMAL MMOL/L
T4 FREE SERPL-MCNC: 1.48 NG/DL (ref 0.93–1.7)
TSH SERPL DL<=0.005 MIU/L-ACNC: 0.76 UIU/ML (ref 0.27–4.2)
WBC # BLD AUTO: 9.21 10*3/MM3 (ref 3.4–10.8)

## 2023-04-13 RX ORDER — BLOOD SUGAR DIAGNOSTIC
1 STRIP MISCELLANEOUS 4 TIMES DAILY
Qty: 200 EACH | Refills: 1 | Status: SHIPPED | OUTPATIENT
Start: 2023-04-13

## 2023-04-13 RX ORDER — GLUCOSAMINE HCL/CHONDROITIN SU 500-400 MG
1 CAPSULE ORAL 4 TIMES DAILY
Qty: 200 EACH | Refills: 11 | Status: SHIPPED | OUTPATIENT
Start: 2023-04-13

## 2023-04-13 RX ORDER — LANCETS 30 GAUGE
1 EACH MISCELLANEOUS 4 TIMES DAILY
Qty: 200 EACH | Refills: 11 | Status: SHIPPED | OUTPATIENT
Start: 2023-04-13

## 2023-04-13 RX ORDER — BLOOD-GLUCOSE METER
KIT MISCELLANEOUS
Qty: 1 EACH | Refills: 0 | Status: SHIPPED | OUTPATIENT
Start: 2023-04-13

## 2023-04-13 NOTE — PROGRESS NOTES
Called and reviewed:  You likely do have gestational diabetes. We have you start checking your glucose four times a day, fasting and two hours after meals. Please record the values and bring  to your next appointment. I also ordered a consultation by diabetic education.     Ijeoma Briceno MD  4/13/2023  12:05 EDT

## 2023-04-14 ENCOUNTER — HOSPITAL ENCOUNTER (OUTPATIENT)
Dept: INFUSION THERAPY | Facility: HOSPITAL | Age: 43
Discharge: HOME | End: 2023-04-14
Admitting: OBSTETRICS & GYNECOLOGY
Payer: COMMERCIAL

## 2023-04-14 VITALS
TEMPERATURE: 96 F | RESPIRATION RATE: 20 BRPM | HEART RATE: 93 BPM | DIASTOLIC BLOOD PRESSURE: 69 MMHG | OXYGEN SATURATION: 98 % | SYSTOLIC BLOOD PRESSURE: 124 MMHG

## 2023-04-14 DIAGNOSIS — O99.280 DEHYDRATION DURING PREGNANCY: Primary | ICD-10-CM

## 2023-04-14 DIAGNOSIS — E86.0 DEHYDRATION DURING PREGNANCY: Primary | ICD-10-CM

## 2023-04-14 PROCEDURE — 96361 HYDRATE IV INFUSION ADD-ON: CPT

## 2023-04-14 PROCEDURE — 96360 HYDRATION IV INFUSION INIT: CPT

## 2023-04-14 RX ORDER — BLOOD-GLUCOSE METER
EACH MISCELLANEOUS
COMMUNITY
Start: 2023-04-13

## 2023-04-14 RX ORDER — SODIUM CHLORIDE, SODIUM LACTATE, POTASSIUM CHLORIDE, CALCIUM CHLORIDE 600; 310; 30; 20 MG/100ML; MG/100ML; MG/100ML; MG/100ML
500 INJECTION, SOLUTION INTRAVENOUS 2 TIMES WEEKLY
Status: DISCONTINUED | OUTPATIENT
Start: 2023-04-14 | End: 2023-04-16 | Stop reason: HOSPADM

## 2023-04-14 RX ORDER — SODIUM CHLORIDE, SODIUM LACTATE, POTASSIUM CHLORIDE, CALCIUM CHLORIDE 600; 310; 30; 20 MG/100ML; MG/100ML; MG/100ML; MG/100ML
500 INJECTION, SOLUTION INTRAVENOUS 2 TIMES WEEKLY
Status: CANCELLED | OUTPATIENT
Start: 2023-04-14

## 2023-04-14 RX ADMIN — SODIUM CHLORIDE, POTASSIUM CHLORIDE, SODIUM LACTATE AND CALCIUM CHLORIDE 500 ML/HR: 600; 310; 30; 20 INJECTION, SOLUTION INTRAVENOUS at 13:34

## 2023-04-19 ENCOUNTER — HOSPITAL ENCOUNTER (OUTPATIENT)
Dept: DIABETES SERVICES | Facility: HOSPITAL | Age: 43
Discharge: HOME | End: 2023-04-19
Payer: COMMERCIAL

## 2023-04-19 ENCOUNTER — HOSPITAL ENCOUNTER (OUTPATIENT)
Dept: INFUSION THERAPY | Facility: HOSPITAL | Age: 43
Discharge: HOME | End: 2023-04-19
Payer: COMMERCIAL

## 2023-04-19 ENCOUNTER — PATIENT MESSAGE (OUTPATIENT)
Dept: OBSTETRICS AND GYNECOLOGY | Age: 43
End: 2023-04-19
Payer: COMMERCIAL

## 2023-04-19 VITALS
TEMPERATURE: 96.4 F | DIASTOLIC BLOOD PRESSURE: 80 MMHG | OXYGEN SATURATION: 99 % | RESPIRATION RATE: 20 BRPM | HEART RATE: 103 BPM | SYSTOLIC BLOOD PRESSURE: 127 MMHG

## 2023-04-19 DIAGNOSIS — E86.0 DEHYDRATION DURING PREGNANCY: Primary | ICD-10-CM

## 2023-04-19 DIAGNOSIS — O99.280 DEHYDRATION DURING PREGNANCY: Primary | ICD-10-CM

## 2023-04-19 PROCEDURE — 96361 HYDRATE IV INFUSION ADD-ON: CPT

## 2023-04-19 PROCEDURE — G0108 DIAB MANAGE TRN  PER INDIV: HCPCS

## 2023-04-19 PROCEDURE — 96360 HYDRATION IV INFUSION INIT: CPT

## 2023-04-19 RX ORDER — SODIUM CHLORIDE, SODIUM LACTATE, POTASSIUM CHLORIDE, CALCIUM CHLORIDE 600; 310; 30; 20 MG/100ML; MG/100ML; MG/100ML; MG/100ML
500 INJECTION, SOLUTION INTRAVENOUS 2 TIMES WEEKLY
Status: DISCONTINUED | OUTPATIENT
Start: 2023-04-19 | End: 2023-04-21 | Stop reason: HOSPADM

## 2023-04-19 RX ORDER — SODIUM CHLORIDE, SODIUM LACTATE, POTASSIUM CHLORIDE, CALCIUM CHLORIDE 600; 310; 30; 20 MG/100ML; MG/100ML; MG/100ML; MG/100ML
500 INJECTION, SOLUTION INTRAVENOUS 2 TIMES WEEKLY
Status: CANCELLED | OUTPATIENT
Start: 2023-04-19

## 2023-04-19 RX ADMIN — SODIUM CHLORIDE, POTASSIUM CHLORIDE, SODIUM LACTATE AND CALCIUM CHLORIDE 500 ML/HR: 600; 310; 30; 20 INJECTION, SOLUTION INTRAVENOUS at 08:36

## 2023-04-19 NOTE — TELEPHONE ENCOUNTER
From: Khadra Khan  To: Ijeoma Briceno  Sent: 4/19/2023 12:35 PM EDT  Subject: Appt    The diabetes education person that I just spoke with suggested that I message you to see if you wanted to see me prior to our appointment on Tuesday, April 25th. She said my sugar seemed fine except for in the mornings after fasting overnight. She mentioned I may need an insulin shot before bed to help with the higher numbers in the mornings. I’m fine either way. Keeping the appointment for next Tuesday or coming in sooner if you think that’s best.     Thanks for always taking such good care of us (Kobe and me).

## 2023-04-19 NOTE — NURSING NOTE
[facility]   Diabetes Management       Date:  2023    Patient:  Khadra Khan     MRN:  [id number]    Gestational age:  21w0d       Diagnosis:  Gestational Diabetes A1    Management Role:  Phase I:  Counseling and Education     Insulin dosing:   N/A- no insulin rxs found.     Summary   The patient was seen for gestational diabetes education. Pt has started BG checks as well as meal planning. Pt does have some dietary restrictions as a result of having gastric sleeve.  Many of her pp BG values are within target, but most FBGs available from pt are >100. Discuss w/pt the use of insulin in pregnancy.   Meal planning reinforced. Discuss hypoglycemia and appropriate tx. Patient verbalizes understanding.  The plan at this time is for pt to send her BG values to Dr Briceno next week. Encourage pt to reach out to me or to Dr. Briceno for follow up questions.       Referring provider:  Ijeoma Briceno Md  [address] [place] [address]      Onelia Whelan RN, BSN, [facility]  2023  15:47 EDT

## 2023-04-21 ENCOUNTER — HOSPITAL ENCOUNTER (OUTPATIENT)
Dept: INFUSION THERAPY | Facility: HOSPITAL | Age: 43
Discharge: HOME | End: 2023-04-21
Payer: COMMERCIAL

## 2023-04-21 VITALS
DIASTOLIC BLOOD PRESSURE: 62 MMHG | RESPIRATION RATE: 20 BRPM | SYSTOLIC BLOOD PRESSURE: 105 MMHG | TEMPERATURE: 96 F | HEART RATE: 83 BPM | OXYGEN SATURATION: 99 %

## 2023-04-21 DIAGNOSIS — O99.280 DEHYDRATION DURING PREGNANCY: Primary | ICD-10-CM

## 2023-04-21 DIAGNOSIS — E86.0 DEHYDRATION DURING PREGNANCY: Primary | ICD-10-CM

## 2023-04-21 PROCEDURE — 96361 HYDRATE IV INFUSION ADD-ON: CPT

## 2023-04-21 PROCEDURE — 96360 HYDRATION IV INFUSION INIT: CPT

## 2023-04-21 RX ORDER — SODIUM CHLORIDE, SODIUM LACTATE, POTASSIUM CHLORIDE, CALCIUM CHLORIDE 600; 310; 30; 20 MG/100ML; MG/100ML; MG/100ML; MG/100ML
1000 INJECTION, SOLUTION INTRAVENOUS 2 TIMES WEEKLY
Status: DISCONTINUED | OUTPATIENT
Start: 2023-04-21 | End: 2023-04-23 | Stop reason: HOSPADM

## 2023-04-21 RX ORDER — SODIUM CHLORIDE, SODIUM LACTATE, POTASSIUM CHLORIDE, CALCIUM CHLORIDE 600; 310; 30; 20 MG/100ML; MG/100ML; MG/100ML; MG/100ML
500 INJECTION, SOLUTION INTRAVENOUS 2 TIMES WEEKLY
Status: CANCELLED | OUTPATIENT
Start: 2023-04-21

## 2023-04-21 RX ADMIN — SODIUM CHLORIDE, POTASSIUM CHLORIDE, SODIUM LACTATE AND CALCIUM CHLORIDE 1000 ML: 600; 310; 30; 20 INJECTION, SOLUTION INTRAVENOUS at 12:54

## 2023-04-24 ENCOUNTER — HOSPITAL ENCOUNTER (OUTPATIENT)
Dept: INFUSION THERAPY | Facility: HOSPITAL | Age: 43
Discharge: HOME | End: 2023-04-24
Admitting: OBSTETRICS & GYNECOLOGY
Payer: COMMERCIAL

## 2023-04-24 VITALS
TEMPERATURE: 96.8 F | SYSTOLIC BLOOD PRESSURE: 130 MMHG | HEART RATE: 98 BPM | RESPIRATION RATE: 20 BRPM | DIASTOLIC BLOOD PRESSURE: 84 MMHG | OXYGEN SATURATION: 99 %

## 2023-04-24 DIAGNOSIS — E86.0 DEHYDRATION DURING PREGNANCY: Primary | ICD-10-CM

## 2023-04-24 DIAGNOSIS — O99.280 DEHYDRATION DURING PREGNANCY: Primary | ICD-10-CM

## 2023-04-24 PROCEDURE — 96361 HYDRATE IV INFUSION ADD-ON: CPT

## 2023-04-24 PROCEDURE — 96360 HYDRATION IV INFUSION INIT: CPT

## 2023-04-24 RX ORDER — SODIUM CHLORIDE, SODIUM LACTATE, POTASSIUM CHLORIDE, CALCIUM CHLORIDE 600; 310; 30; 20 MG/100ML; MG/100ML; MG/100ML; MG/100ML
500 INJECTION, SOLUTION INTRAVENOUS 2 TIMES WEEKLY
Status: DISCONTINUED | OUTPATIENT
Start: 2023-04-24 | End: 2023-04-26 | Stop reason: HOSPADM

## 2023-04-24 RX ORDER — SODIUM CHLORIDE, SODIUM LACTATE, POTASSIUM CHLORIDE, CALCIUM CHLORIDE 600; 310; 30; 20 MG/100ML; MG/100ML; MG/100ML; MG/100ML
500 INJECTION, SOLUTION INTRAVENOUS 2 TIMES WEEKLY
Status: CANCELLED | OUTPATIENT
Start: 2023-04-24

## 2023-04-24 RX ADMIN — SODIUM CHLORIDE, POTASSIUM CHLORIDE, SODIUM LACTATE AND CALCIUM CHLORIDE 500 ML/HR: 600; 310; 30; 20 INJECTION, SOLUTION INTRAVENOUS at 08:51

## 2023-04-25 ENCOUNTER — ROUTINE PRENATAL (OUTPATIENT)
Dept: OBSTETRICS AND GYNECOLOGY | Age: 43
End: 2023-04-25
Payer: COMMERCIAL

## 2023-04-25 ENCOUNTER — PATIENT MESSAGE (OUTPATIENT)
Dept: OBSTETRICS AND GYNECOLOGY | Age: 43
End: 2023-04-25

## 2023-04-25 VITALS — SYSTOLIC BLOOD PRESSURE: 126 MMHG | WEIGHT: 293 LBS | DIASTOLIC BLOOD PRESSURE: 70 MMHG | BODY MASS INDEX: 47.87 KG/M2

## 2023-04-25 DIAGNOSIS — Z13.89 SCREENING FOR BLOOD OR PROTEIN IN URINE: Primary | ICD-10-CM

## 2023-04-25 LAB
BILIRUB BLD-MCNC: ABNORMAL MG/DL
GLUCOSE UR STRIP-MCNC: NEGATIVE MG/DL
KETONES UR QL: ABNORMAL
LEUKOCYTE EST, POC: ABNORMAL
NITRITE UR-MCNC: NEGATIVE MG/ML
PH UR: 7 [PH] (ref 5–8)
PROT UR STRIP-MCNC: ABNORMAL MG/DL
RBC # UR STRIP: ABNORMAL /UL
SP GR UR: 1.02 (ref 1–1.03)
UROBILINOGEN UR QL: ABNORMAL

## 2023-04-25 RX ORDER — INSULIN HUMAN 100 [IU]/ML
INJECTION, SUSPENSION SUBCUTANEOUS
Qty: 6 ML | Refills: 12 | Status: SHIPPED | OUTPATIENT
Start: 2023-04-25

## 2023-04-25 RX ORDER — FLURBIPROFEN SODIUM 0.3 MG/ML
1 SOLUTION/ DROPS OPHTHALMIC DAILY
Qty: 90 EACH | Refills: 3 | Status: SHIPPED | OUTPATIENT
Start: 2023-04-25

## 2023-04-25 RX ORDER — DEXTROSE 5 G
15 TABLET,CHEWABLE ORAL AS NEEDED
Qty: 30 TABLET | Refills: 2 | Status: SHIPPED | OUTPATIENT
Start: 2023-04-25 | End: 2024-04-24

## 2023-04-25 NOTE — TELEPHONE ENCOUNTER
From: Khadra Khan  To: Ijeoma Briceno  Sent: 4/25/2023 2:31 PM EDT  Subject: Glucose Log     April 2023

## 2023-04-25 NOTE — PROGRESS NOTES
Chief Complaint   Patient presents with   • Routine Prenatal Visit     21w6d OB Check      Khadra Khan is doing okay, had fluids yesterday. Still trying to maintain her fluid intake.  She has been checking her glucose and met with diabetic education, she notes that her fastings are elevated.  She has been checking and logging them on her phone, does not have a paper log today.  On review of her glucoses, they are in the range of 98-1 15 for fastings, she has 2 elevated postprandial values but otherwise they are within normal limits    Plan to continue her metformin, plan to start NPH 15 units at bedtime.  Discussed that we need to follow-up next week, likely will need to titrate her insulin    Enter insulin dosing        FU 1 wk    Ijeoma Briceno MD  4/25/2023  11:25 EDT

## 2023-04-26 ENCOUNTER — TELEPHONE (OUTPATIENT)
Dept: OBSTETRICS AND GYNECOLOGY | Age: 43
End: 2023-04-26
Payer: COMMERCIAL

## 2023-04-26 NOTE — TELEPHONE ENCOUNTER
Pt unable to make appt 05/02 @ 1:15 due to other appointment times, Please advise if patient can be seen @ 11:45 or 12:00 on 05/02.

## 2023-04-28 ENCOUNTER — HOSPITAL ENCOUNTER (OUTPATIENT)
Dept: INFUSION THERAPY | Facility: HOSPITAL | Age: 43
Discharge: HOME | End: 2023-04-28
Payer: COMMERCIAL

## 2023-04-28 VITALS
TEMPERATURE: 96.6 F | RESPIRATION RATE: 20 BRPM | OXYGEN SATURATION: 100 % | HEART RATE: 97 BPM | DIASTOLIC BLOOD PRESSURE: 77 MMHG | SYSTOLIC BLOOD PRESSURE: 123 MMHG

## 2023-04-28 DIAGNOSIS — O99.280 DEHYDRATION DURING PREGNANCY: Primary | ICD-10-CM

## 2023-04-28 DIAGNOSIS — E86.0 DEHYDRATION DURING PREGNANCY: Primary | ICD-10-CM

## 2023-04-28 PROCEDURE — 96361 HYDRATE IV INFUSION ADD-ON: CPT

## 2023-04-28 PROCEDURE — 96360 HYDRATION IV INFUSION INIT: CPT

## 2023-04-28 RX ORDER — SODIUM CHLORIDE, SODIUM LACTATE, POTASSIUM CHLORIDE, CALCIUM CHLORIDE 600; 310; 30; 20 MG/100ML; MG/100ML; MG/100ML; MG/100ML
500 INJECTION, SOLUTION INTRAVENOUS 2 TIMES WEEKLY
Status: DISCONTINUED | OUTPATIENT
Start: 2023-04-28 | End: 2023-04-30 | Stop reason: HOSPADM

## 2023-04-28 RX ORDER — SODIUM CHLORIDE, SODIUM LACTATE, POTASSIUM CHLORIDE, CALCIUM CHLORIDE 600; 310; 30; 20 MG/100ML; MG/100ML; MG/100ML; MG/100ML
500 INJECTION, SOLUTION INTRAVENOUS 2 TIMES WEEKLY
Status: CANCELLED | OUTPATIENT
Start: 2023-04-28

## 2023-04-28 RX ADMIN — SODIUM CHLORIDE, POTASSIUM CHLORIDE, SODIUM LACTATE AND CALCIUM CHLORIDE 500 ML/HR: 600; 310; 30; 20 INJECTION, SOLUTION INTRAVENOUS at 14:20

## 2023-05-02 ENCOUNTER — TELEPHONE (OUTPATIENT)
Dept: OBSTETRICS AND GYNECOLOGY | Age: 43
End: 2023-05-02
Payer: COMMERCIAL

## 2023-05-02 ENCOUNTER — ROUTINE PRENATAL (OUTPATIENT)
Dept: OBSTETRICS AND GYNECOLOGY | Age: 43
End: 2023-05-02
Payer: COMMERCIAL

## 2023-05-02 ENCOUNTER — HOSPITAL ENCOUNTER (OUTPATIENT)
Dept: INFUSION THERAPY | Facility: HOSPITAL | Age: 43
Discharge: HOME | End: 2023-05-02
Admitting: OBSTETRICS & GYNECOLOGY
Payer: COMMERCIAL

## 2023-05-02 VITALS — DIASTOLIC BLOOD PRESSURE: 68 MMHG | WEIGHT: 293 LBS | SYSTOLIC BLOOD PRESSURE: 110 MMHG | BODY MASS INDEX: 48.26 KG/M2

## 2023-05-02 VITALS
SYSTOLIC BLOOD PRESSURE: 111 MMHG | HEART RATE: 78 BPM | TEMPERATURE: 96.4 F | DIASTOLIC BLOOD PRESSURE: 60 MMHG | RESPIRATION RATE: 20 BRPM | OXYGEN SATURATION: 99 %

## 2023-05-02 DIAGNOSIS — Z13.89 SCREENING FOR BLOOD OR PROTEIN IN URINE: Primary | ICD-10-CM

## 2023-05-02 DIAGNOSIS — E86.0 DEHYDRATION DURING PREGNANCY: Primary | ICD-10-CM

## 2023-05-02 DIAGNOSIS — O99.280 DEHYDRATION DURING PREGNANCY: Primary | ICD-10-CM

## 2023-05-02 LAB
BILIRUB BLD-MCNC: NEGATIVE MG/DL
GLUCOSE UR STRIP-MCNC: NEGATIVE MG/DL
KETONES UR QL: NEGATIVE
LEUKOCYTE EST, POC: ABNORMAL
NITRITE UR-MCNC: NEGATIVE MG/ML
PH UR: 8.5 [PH] (ref 5–8)
PROT UR STRIP-MCNC: NEGATIVE MG/DL
RBC # UR STRIP: NEGATIVE /UL
SP GR UR: 1.01 (ref 1–1.03)
UROBILINOGEN UR QL: NORMAL

## 2023-05-02 PROCEDURE — 96360 HYDRATION IV INFUSION INIT: CPT

## 2023-05-02 PROCEDURE — 96361 HYDRATE IV INFUSION ADD-ON: CPT

## 2023-05-02 RX ORDER — INSULIN HUMAN 100 [IU]/ML
INJECTION, SUSPENSION SUBCUTANEOUS
COMMUNITY
Start: 2023-05-01 | End: 2023-05-02 | Stop reason: SDUPTHER

## 2023-05-02 RX ORDER — SODIUM CHLORIDE, SODIUM LACTATE, POTASSIUM CHLORIDE, CALCIUM CHLORIDE 600; 310; 30; 20 MG/100ML; MG/100ML; MG/100ML; MG/100ML
500 INJECTION, SOLUTION INTRAVENOUS 2 TIMES WEEKLY
Status: DISCONTINUED | OUTPATIENT
Start: 2023-05-02 | End: 2023-05-04 | Stop reason: HOSPADM

## 2023-05-02 RX ORDER — INSULIN HUMAN 100 [IU]/ML
INJECTION, SUSPENSION SUBCUTANEOUS
Qty: 6 EACH | Refills: 6 | Status: SHIPPED | OUTPATIENT
Start: 2023-05-02

## 2023-05-02 RX ORDER — FLURBIPROFEN SODIUM 0.3 MG/ML
1 SOLUTION/ DROPS OPHTHALMIC DAILY
Qty: 90 EACH | Refills: 3 | Status: SHIPPED | OUTPATIENT
Start: 2023-05-02

## 2023-05-02 RX ORDER — IBUPROFEN 600 MG/1
TABLET ORAL
COMMUNITY
Start: 2023-04-26

## 2023-05-02 RX ORDER — INSULIN LISPRO 200 [IU]/ML
15 INJECTION, SOLUTION SUBCUTANEOUS
Qty: 3 ML | Refills: 6 | Status: SHIPPED | OUTPATIENT
Start: 2023-05-02

## 2023-05-02 RX ORDER — SODIUM CHLORIDE, SODIUM LACTATE, POTASSIUM CHLORIDE, CALCIUM CHLORIDE 600; 310; 30; 20 MG/100ML; MG/100ML; MG/100ML; MG/100ML
500 INJECTION, SOLUTION INTRAVENOUS 2 TIMES WEEKLY
Status: CANCELLED | OUTPATIENT
Start: 2023-05-02

## 2023-05-02 RX ORDER — FLURBIPROFEN SODIUM 0.3 MG/ML
1 SOLUTION/ DROPS OPHTHALMIC 4 TIMES DAILY
Qty: 200 EACH | Refills: 6 | Status: SHIPPED | OUTPATIENT
Start: 2023-05-02

## 2023-05-02 RX ADMIN — SODIUM CHLORIDE, POTASSIUM CHLORIDE, SODIUM LACTATE AND CALCIUM CHLORIDE 500 ML/HR: 600; 310; 30; 20 INJECTION, SOLUTION INTRAVENOUS at 08:32

## 2023-05-02 NOTE — TELEPHONE ENCOUNTER
----- Message from Char Malloy CMA sent at 5/2/2023  3:33 PM EDT -----  Vy [place],     Can you help me with this. The NPH that we increased today for this patient insurance wont approve due to pt picking up prescription yesterday. Can you help get insurance to approve the increase    Let me know if you need anything    ----- Message -----  From: Ijeoma Briceno MD  Sent: 5/2/2023   2:02 PM EDT  To: Char Malloy CMA    Can you call the pharmacy toward the end of the day and see if they can dispense what I sent in, she needs more NPH as we increased the dose and added a morning dose.  I also sent in Humalog

## 2023-05-02 NOTE — Clinical Note
Can you call the pharmacy toward the end of the day and see if they can dispense what I sent in, she needs more NPH as we increased the dose and added a morning dose.  I also sent in Humalog

## 2023-05-02 NOTE — PROGRESS NOTES
Chief Complaint   Patient presents with   • Routine Prenatal Visit     22w6d OB Check      Khadra Khan notes that her glucoses are still elevated, but she only started the 15 units of NPH at night yesterday as there was an issue with the pharmacy.  Her fastings are all elevated, also most all of her postprandial values are elevated.  Plan to start a modified weight-based regimen    Enter insulin dosing Insulin dosing   Insulin/breakfast: 15 u (5/2/2023  2:01 PM)  Insulin/Dinner: 15 u (5/2/2023  2:01 PM)  Long Insulin AM: 40 u (5/2/2023  2:01 PM)  Insulin long PM/HS : 20 u (5/2/2023  2:01 PM)    New prescriptions and also more pen needles were sent to the pharmacy today.    Follow-up in 1 week    Ijeoma Briceno MD  5/2/2023  14:01 EDT

## 2023-05-02 NOTE — TELEPHONE ENCOUNTER
PT IS NEEDING TO SPEAK TO A NURSE REGARDING THE INSULIN SHE WAS PRESCRIBED BY DR HASSAN, PLEASE ADVISE PT.

## 2023-05-05 ENCOUNTER — HOSPITAL ENCOUNTER (OUTPATIENT)
Dept: INFUSION THERAPY | Facility: HOSPITAL | Age: 43
Discharge: HOME | End: 2023-05-05
Payer: COMMERCIAL

## 2023-05-05 VITALS
DIASTOLIC BLOOD PRESSURE: 70 MMHG | HEART RATE: 93 BPM | TEMPERATURE: 96.2 F | SYSTOLIC BLOOD PRESSURE: 112 MMHG | RESPIRATION RATE: 20 BRPM | OXYGEN SATURATION: 99 %

## 2023-05-05 DIAGNOSIS — O99.280 DEHYDRATION DURING PREGNANCY: Primary | ICD-10-CM

## 2023-05-05 DIAGNOSIS — E86.0 DEHYDRATION DURING PREGNANCY: Primary | ICD-10-CM

## 2023-05-05 PROCEDURE — 96361 HYDRATE IV INFUSION ADD-ON: CPT

## 2023-05-05 PROCEDURE — 96360 HYDRATION IV INFUSION INIT: CPT

## 2023-05-05 RX ORDER — SODIUM CHLORIDE, SODIUM LACTATE, POTASSIUM CHLORIDE, CALCIUM CHLORIDE 600; 310; 30; 20 MG/100ML; MG/100ML; MG/100ML; MG/100ML
500 INJECTION, SOLUTION INTRAVENOUS 2 TIMES WEEKLY
Status: CANCELLED | OUTPATIENT
Start: 2023-05-05

## 2023-05-05 RX ORDER — SODIUM CHLORIDE, SODIUM LACTATE, POTASSIUM CHLORIDE, CALCIUM CHLORIDE 600; 310; 30; 20 MG/100ML; MG/100ML; MG/100ML; MG/100ML
500 INJECTION, SOLUTION INTRAVENOUS 2 TIMES WEEKLY
Status: DISCONTINUED | OUTPATIENT
Start: 2023-05-05 | End: 2023-05-07 | Stop reason: HOSPADM

## 2023-05-05 RX ADMIN — SODIUM CHLORIDE, POTASSIUM CHLORIDE, SODIUM LACTATE AND CALCIUM CHLORIDE 500 ML/HR: 600; 310; 30; 20 INJECTION, SOLUTION INTRAVENOUS at 08:31

## 2023-05-09 ENCOUNTER — HOSPITAL ENCOUNTER (OUTPATIENT)
Dept: INFUSION THERAPY | Facility: HOSPITAL | Age: 43
Discharge: HOME | End: 2023-05-09
Admitting: OBSTETRICS & GYNECOLOGY
Payer: COMMERCIAL

## 2023-05-09 ENCOUNTER — ROUTINE PRENATAL (OUTPATIENT)
Dept: OBSTETRICS AND GYNECOLOGY | Age: 43
End: 2023-05-09
Payer: COMMERCIAL

## 2023-05-09 VITALS — DIASTOLIC BLOOD PRESSURE: 70 MMHG | BODY MASS INDEX: 49.26 KG/M2 | WEIGHT: 293 LBS | SYSTOLIC BLOOD PRESSURE: 116 MMHG

## 2023-05-09 VITALS
SYSTOLIC BLOOD PRESSURE: 115 MMHG | TEMPERATURE: 97.7 F | DIASTOLIC BLOOD PRESSURE: 58 MMHG | HEART RATE: 70 BPM | OXYGEN SATURATION: 99 % | RESPIRATION RATE: 20 BRPM

## 2023-05-09 DIAGNOSIS — E86.0 DEHYDRATION DURING PREGNANCY: Primary | ICD-10-CM

## 2023-05-09 DIAGNOSIS — O09.92 HIGH-RISK PREGNANCY IN SECOND TRIMESTER: ICD-10-CM

## 2023-05-09 DIAGNOSIS — Z13.89 SCREENING FOR BLOOD OR PROTEIN IN URINE: Primary | ICD-10-CM

## 2023-05-09 DIAGNOSIS — O99.280 DEHYDRATION DURING PREGNANCY: Primary | ICD-10-CM

## 2023-05-09 LAB
BILIRUB BLD-MCNC: NEGATIVE MG/DL
GLUCOSE UR STRIP-MCNC: NEGATIVE MG/DL
KETONES UR QL: NEGATIVE
LEUKOCYTE EST, POC: ABNORMAL
NITRITE UR-MCNC: NEGATIVE MG/ML
PH UR: 5.5 [PH] (ref 5–8)
PROT UR STRIP-MCNC: NEGATIVE MG/DL
RBC # UR STRIP: NEGATIVE /UL
SP GR UR: 1.02 (ref 1–1.03)
UROBILINOGEN UR QL: NORMAL

## 2023-05-09 PROCEDURE — 96360 HYDRATION IV INFUSION INIT: CPT

## 2023-05-09 PROCEDURE — 96361 HYDRATE IV INFUSION ADD-ON: CPT

## 2023-05-09 RX ORDER — SODIUM CHLORIDE, SODIUM LACTATE, POTASSIUM CHLORIDE, CALCIUM CHLORIDE 600; 310; 30; 20 MG/100ML; MG/100ML; MG/100ML; MG/100ML
500 INJECTION, SOLUTION INTRAVENOUS 2 TIMES WEEKLY
Status: DISCONTINUED | OUTPATIENT
Start: 2023-05-09 | End: 2023-05-11 | Stop reason: HOSPADM

## 2023-05-09 RX ORDER — SODIUM CHLORIDE, SODIUM LACTATE, POTASSIUM CHLORIDE, CALCIUM CHLORIDE 600; 310; 30; 20 MG/100ML; MG/100ML; MG/100ML; MG/100ML
500 INJECTION, SOLUTION INTRAVENOUS 2 TIMES WEEKLY
Status: CANCELLED | OUTPATIENT
Start: 2023-05-09

## 2023-05-09 RX ADMIN — SODIUM CHLORIDE, POTASSIUM CHLORIDE, SODIUM LACTATE AND CALCIUM CHLORIDE 500 ML/HR: 600; 310; 30; 20 INJECTION, SOLUTION INTRAVENOUS at 08:59

## 2023-05-09 NOTE — PROGRESS NOTES
Chief Complaint   Patient presents with   • Routine Prenatal Visit     23w6d OB Check with US     Khadra Khan is doing well, her glucose has improved but notes that she had a low overnight last night. She woke up feeling hot, sweaty, incr heart rate. Also happened in the morning 5/5.  Plan to decrease her long acting a bit due to this:  Enter insulin dosing Insulin dosing   Insulin/breakfast: 15 u (5/9/2023  2:26 PM)  Insulin/Lunch: 15 u (5/9/2023  2:26 PM)  Insulin/Dinner: 15 u (5/9/2023  2:26 PM)  Long Insulin AM: 35 u (5/9/2023  2:26 PM)  Insulin long PM/HS : 17 u (5/9/2023  2:26 PM)    Normal but incomplete anatomy still, needs follow-up for heart views.  Plan to complete this in 4 weeks.    Plan to follow-up in 2 weeks but advised that if her glucose is not well controlled needs to notify us sooner.    Ijeoma Briceno MD  5/9/2023  14:27 EDT

## 2023-05-12 ENCOUNTER — HOSPITAL ENCOUNTER (OUTPATIENT)
Dept: INFUSION THERAPY | Facility: HOSPITAL | Age: 43
Discharge: HOME | End: 2023-05-12
Payer: COMMERCIAL

## 2023-05-12 VITALS
RESPIRATION RATE: 20 BRPM | HEART RATE: 93 BPM | DIASTOLIC BLOOD PRESSURE: 74 MMHG | TEMPERATURE: 96.9 F | SYSTOLIC BLOOD PRESSURE: 120 MMHG | OXYGEN SATURATION: 99 %

## 2023-05-12 DIAGNOSIS — O99.280 DEHYDRATION DURING PREGNANCY: Primary | ICD-10-CM

## 2023-05-12 DIAGNOSIS — E86.0 DEHYDRATION DURING PREGNANCY: Primary | ICD-10-CM

## 2023-05-12 PROCEDURE — 96361 HYDRATE IV INFUSION ADD-ON: CPT

## 2023-05-12 PROCEDURE — 96360 HYDRATION IV INFUSION INIT: CPT

## 2023-05-12 RX ORDER — SODIUM CHLORIDE, SODIUM LACTATE, POTASSIUM CHLORIDE, CALCIUM CHLORIDE 600; 310; 30; 20 MG/100ML; MG/100ML; MG/100ML; MG/100ML
500 INJECTION, SOLUTION INTRAVENOUS 2 TIMES WEEKLY
Status: DISCONTINUED | OUTPATIENT
Start: 2023-05-12 | End: 2023-05-14 | Stop reason: HOSPADM

## 2023-05-12 RX ORDER — SODIUM CHLORIDE, SODIUM LACTATE, POTASSIUM CHLORIDE, CALCIUM CHLORIDE 600; 310; 30; 20 MG/100ML; MG/100ML; MG/100ML; MG/100ML
500 INJECTION, SOLUTION INTRAVENOUS 2 TIMES WEEKLY
Status: CANCELLED | OUTPATIENT
Start: 2023-05-12

## 2023-05-12 RX ADMIN — SODIUM CHLORIDE, POTASSIUM CHLORIDE, SODIUM LACTATE AND CALCIUM CHLORIDE 500 ML/HR: 600; 310; 30; 20 INJECTION, SOLUTION INTRAVENOUS at 14:21

## 2023-05-15 ENCOUNTER — HOSPITAL ENCOUNTER (OUTPATIENT)
Dept: INFUSION THERAPY | Facility: HOSPITAL | Age: 43
Discharge: HOME | End: 2023-05-15
Admitting: OBSTETRICS & GYNECOLOGY
Payer: COMMERCIAL

## 2023-05-15 VITALS
HEART RATE: 98 BPM | DIASTOLIC BLOOD PRESSURE: 73 MMHG | OXYGEN SATURATION: 100 % | SYSTOLIC BLOOD PRESSURE: 128 MMHG | TEMPERATURE: 96.6 F | RESPIRATION RATE: 20 BRPM

## 2023-05-15 DIAGNOSIS — O99.280 DEHYDRATION DURING PREGNANCY: Primary | ICD-10-CM

## 2023-05-15 DIAGNOSIS — E86.0 DEHYDRATION DURING PREGNANCY: Primary | ICD-10-CM

## 2023-05-15 PROCEDURE — 96366 THER/PROPH/DIAG IV INF ADDON: CPT

## 2023-05-15 PROCEDURE — 96361 HYDRATE IV INFUSION ADD-ON: CPT

## 2023-05-15 PROCEDURE — 96360 HYDRATION IV INFUSION INIT: CPT

## 2023-05-15 PROCEDURE — 96365 THER/PROPH/DIAG IV INF INIT: CPT

## 2023-05-15 RX ORDER — SODIUM CHLORIDE, SODIUM LACTATE, POTASSIUM CHLORIDE, CALCIUM CHLORIDE 600; 310; 30; 20 MG/100ML; MG/100ML; MG/100ML; MG/100ML
500 INJECTION, SOLUTION INTRAVENOUS 2 TIMES WEEKLY
Status: CANCELLED | OUTPATIENT
Start: 2023-05-15

## 2023-05-15 RX ORDER — SODIUM CHLORIDE, SODIUM LACTATE, POTASSIUM CHLORIDE, CALCIUM CHLORIDE 600; 310; 30; 20 MG/100ML; MG/100ML; MG/100ML; MG/100ML
500 INJECTION, SOLUTION INTRAVENOUS 2 TIMES WEEKLY
Status: DISCONTINUED | OUTPATIENT
Start: 2023-05-15 | End: 2023-05-17 | Stop reason: HOSPADM

## 2023-05-15 RX ADMIN — SODIUM CHLORIDE, POTASSIUM CHLORIDE, SODIUM LACTATE AND CALCIUM CHLORIDE 500 ML/HR: 600; 310; 30; 20 INJECTION, SOLUTION INTRAVENOUS at 13:32

## 2023-05-18 ENCOUNTER — HOSPITAL ENCOUNTER (OUTPATIENT)
Dept: INFUSION THERAPY | Facility: HOSPITAL | Age: 43
Discharge: HOME | End: 2023-05-18
Admitting: OBSTETRICS & GYNECOLOGY
Payer: COMMERCIAL

## 2023-05-18 VITALS
SYSTOLIC BLOOD PRESSURE: 134 MMHG | RESPIRATION RATE: 20 BRPM | OXYGEN SATURATION: 100 % | TEMPERATURE: 96.8 F | DIASTOLIC BLOOD PRESSURE: 79 MMHG | HEART RATE: 103 BPM

## 2023-05-18 DIAGNOSIS — O99.280 DEHYDRATION DURING PREGNANCY: Primary | ICD-10-CM

## 2023-05-18 DIAGNOSIS — E86.0 DEHYDRATION DURING PREGNANCY: Primary | ICD-10-CM

## 2023-05-18 PROCEDURE — 96360 HYDRATION IV INFUSION INIT: CPT

## 2023-05-18 PROCEDURE — 96361 HYDRATE IV INFUSION ADD-ON: CPT

## 2023-05-18 RX ORDER — SODIUM CHLORIDE, SODIUM LACTATE, POTASSIUM CHLORIDE, CALCIUM CHLORIDE 600; 310; 30; 20 MG/100ML; MG/100ML; MG/100ML; MG/100ML
500 INJECTION, SOLUTION INTRAVENOUS 2 TIMES WEEKLY
Status: DISCONTINUED | OUTPATIENT
Start: 2023-05-18 | End: 2023-05-20 | Stop reason: HOSPADM

## 2023-05-18 RX ORDER — SODIUM CHLORIDE, SODIUM LACTATE, POTASSIUM CHLORIDE, CALCIUM CHLORIDE 600; 310; 30; 20 MG/100ML; MG/100ML; MG/100ML; MG/100ML
500 INJECTION, SOLUTION INTRAVENOUS 2 TIMES WEEKLY
Status: CANCELLED | OUTPATIENT
Start: 2023-05-18

## 2023-05-18 RX ADMIN — SODIUM CHLORIDE, POTASSIUM CHLORIDE, SODIUM LACTATE AND CALCIUM CHLORIDE 500 ML/HR: 600; 310; 30; 20 INJECTION, SOLUTION INTRAVENOUS at 08:41

## 2023-05-21 ENCOUNTER — PATIENT MESSAGE (OUTPATIENT)
Dept: OBSTETRICS AND GYNECOLOGY | Age: 43
End: 2023-05-21
Payer: COMMERCIAL

## 2023-05-21 ENCOUNTER — HOSPITAL ENCOUNTER (EMERGENCY)
Facility: HOSPITAL | Age: 43
Discharge: HOME | End: 2023-05-22
Attending: OBSTETRICS & GYNECOLOGY | Admitting: OBSTETRICS & GYNECOLOGY
Payer: COMMERCIAL

## 2023-05-21 LAB
BASOPHILS # BLD AUTO: 0.02 10*3/MM3 (ref 0–0.2)
BASOPHILS NFR BLD AUTO: 0.2 % (ref 0–1.5)
DEPRECATED RDW RBC AUTO: 41.3 FL (ref 37–54)
EOSINOPHIL # BLD AUTO: 0.12 10*3/MM3 (ref 0–0.4)
EOSINOPHIL NFR BLD AUTO: 1 % (ref 0.3–6.2)
ERYTHROCYTE [DISTWIDTH] IN BLOOD BY AUTOMATED COUNT: 13 % (ref 12.3–15.4)
HCT VFR BLD AUTO: 33.1 % (ref 34–46.6)
HGB BLD-MCNC: 11.1 G/DL (ref 12–15.9)
IMM GRANULOCYTES # BLD AUTO: 0.09 10*3/MM3 (ref 0–0.05)
IMM GRANULOCYTES NFR BLD AUTO: 0.7 % (ref 0–0.5)
LYMPHOCYTES # BLD AUTO: 2.66 10*3/MM3 (ref 0.7–3.1)
LYMPHOCYTES NFR BLD AUTO: 21.4 % (ref 19.6–45.3)
MCH RBC QN AUTO: 28.8 PG (ref 26.6–33)
MCHC RBC AUTO-ENTMCNC: 33.5 G/DL (ref 31.5–35.7)
MCV RBC AUTO: 85.8 FL (ref 79–97)
MONOCYTES # BLD AUTO: 0.5 10*3/MM3 (ref 0.1–0.9)
MONOCYTES NFR BLD AUTO: 4 % (ref 5–12)
NEUTROPHILS NFR BLD AUTO: 72.7 % (ref 42.7–76)
NEUTROPHILS NFR BLD AUTO: 9.05 10*3/MM3 (ref 1.7–7)
NRBC BLD AUTO-RTO: 0 /100 WBC (ref 0–0.2)
PLATELET # BLD AUTO: 369 10*3/MM3 (ref 140–450)
PMV BLD AUTO: 9.6 FL (ref 6–12)
RBC # BLD AUTO: 3.86 10*6/MM3 (ref 3.77–5.28)
WBC NRBC COR # BLD: 12.44 10*3/MM3 (ref 3.4–10.8)

## 2023-05-21 PROCEDURE — 86901 BLOOD TYPING SEROLOGIC RH(D): CPT | Performed by: OBSTETRICS & GYNECOLOGY

## 2023-05-21 PROCEDURE — 59025 FETAL NON-STRESS TEST: CPT | Performed by: OBSTETRICS & GYNECOLOGY

## 2023-05-21 PROCEDURE — 99284 EMERGENCY DEPT VISIT MOD MDM: CPT | Performed by: OBSTETRICS & GYNECOLOGY

## 2023-05-21 PROCEDURE — 80053 COMPREHEN METABOLIC PANEL: CPT | Performed by: OBSTETRICS & GYNECOLOGY

## 2023-05-21 PROCEDURE — 81001 URINALYSIS AUTO W/SCOPE: CPT | Performed by: OBSTETRICS & GYNECOLOGY

## 2023-05-21 PROCEDURE — 86850 RBC ANTIBODY SCREEN: CPT | Performed by: OBSTETRICS & GYNECOLOGY

## 2023-05-21 PROCEDURE — 85025 COMPLETE CBC W/AUTO DIFF WBC: CPT | Performed by: OBSTETRICS & GYNECOLOGY

## 2023-05-21 PROCEDURE — 86900 BLOOD TYPING SEROLOGIC ABO: CPT | Performed by: OBSTETRICS & GYNECOLOGY

## 2023-05-21 PROCEDURE — 87086 URINE CULTURE/COLONY COUNT: CPT | Performed by: OBSTETRICS & GYNECOLOGY

## 2023-05-21 RX ORDER — SODIUM CHLORIDE 0.9 % (FLUSH) 0.9 %
10 SYRINGE (ML) INJECTION AS NEEDED
Status: DISCONTINUED | OUTPATIENT
Start: 2023-05-21 | End: 2023-05-22 | Stop reason: HOSPADM

## 2023-05-21 RX ORDER — SODIUM CHLORIDE 0.9 % (FLUSH) 0.9 %
10 SYRINGE (ML) INJECTION EVERY 12 HOURS SCHEDULED
Status: DISCONTINUED | OUTPATIENT
Start: 2023-05-22 | End: 2023-05-22 | Stop reason: HOSPADM

## 2023-05-21 RX ADMIN — SODIUM CHLORIDE, POTASSIUM CHLORIDE, SODIUM LACTATE AND CALCIUM CHLORIDE 1000 ML: 600; 310; 30; 20 INJECTION, SOLUTION INTRAVENOUS at 23:29

## 2023-05-22 ENCOUNTER — HOSPITAL ENCOUNTER (OUTPATIENT)
Dept: INFUSION THERAPY | Facility: HOSPITAL | Age: 43
Discharge: HOME | End: 2023-05-22
Admitting: OBSTETRICS & GYNECOLOGY
Payer: COMMERCIAL

## 2023-05-22 VITALS
HEIGHT: 67 IN | HEART RATE: 79 BPM | DIASTOLIC BLOOD PRESSURE: 44 MMHG | WEIGHT: 293 LBS | TEMPERATURE: 97.9 F | BODY MASS INDEX: 45.99 KG/M2 | OXYGEN SATURATION: 99 % | RESPIRATION RATE: 15 BRPM | SYSTOLIC BLOOD PRESSURE: 93 MMHG

## 2023-05-22 VITALS
OXYGEN SATURATION: 100 % | TEMPERATURE: 96.8 F | RESPIRATION RATE: 20 BRPM | HEART RATE: 80 BPM | SYSTOLIC BLOOD PRESSURE: 124 MMHG | DIASTOLIC BLOOD PRESSURE: 72 MMHG

## 2023-05-22 DIAGNOSIS — E86.0 DEHYDRATION DURING PREGNANCY: Primary | ICD-10-CM

## 2023-05-22 DIAGNOSIS — O99.280 DEHYDRATION DURING PREGNANCY: Primary | ICD-10-CM

## 2023-05-22 LAB
ABO GROUP BLD: NORMAL
ALBUMIN SERPL-MCNC: 3.2 G/DL (ref 3.5–5.2)
ALBUMIN/GLOB SERPL: 1.2 G/DL
ALP SERPL-CCNC: 114 U/L (ref 39–117)
ALT SERPL W P-5'-P-CCNC: 16 U/L (ref 1–33)
ANION GAP SERPL CALCULATED.3IONS-SCNC: 8.4 MMOL/L (ref 5–15)
AST SERPL-CCNC: 14 U/L (ref 1–32)
BACTERIA UR QL AUTO: ABNORMAL /HPF
BILIRUB SERPL-MCNC: <0.2 MG/DL (ref 0–1.2)
BILIRUB UR QL STRIP: NEGATIVE
BLD GP AB SCN SERPL QL: NEGATIVE
BUN SERPL-MCNC: 8 MG/DL (ref 6–20)
BUN/CREAT SERPL: 9.9 (ref 7–25)
CALCIUM SPEC-SCNC: 9.1 MG/DL (ref 8.6–10.5)
CHLORIDE SERPL-SCNC: 102 MMOL/L (ref 98–107)
CLARITY UR: CLEAR
CO2 SERPL-SCNC: 25.6 MMOL/L (ref 22–29)
COLOR UR: ABNORMAL
CREAT SERPL-MCNC: 0.81 MG/DL (ref 0.57–1)
EGFRCR SERPLBLD CKD-EPI 2021: 94.2 ML/MIN/1.73
GLOBULIN UR ELPH-MCNC: 2.6 GM/DL
GLUCOSE SERPL-MCNC: 111 MG/DL (ref 65–99)
GLUCOSE UR STRIP-MCNC: NEGATIVE MG/DL
HGB UR QL STRIP.AUTO: NEGATIVE
HYALINE CASTS UR QL AUTO: ABNORMAL /LPF
KETONES UR QL STRIP: ABNORMAL
LEUKOCYTE ESTERASE UR QL STRIP.AUTO: ABNORMAL
NITRITE UR QL STRIP: NEGATIVE
PH UR STRIP.AUTO: 7 [PH] (ref 5–8)
POTASSIUM SERPL-SCNC: 3.9 MMOL/L (ref 3.5–5.2)
PROT SERPL-MCNC: 5.8 G/DL (ref 6–8.5)
PROT UR QL STRIP: NEGATIVE
RBC # UR STRIP: ABNORMAL /HPF
REF LAB TEST METHOD: ABNORMAL
RH BLD: POSITIVE
SODIUM SERPL-SCNC: 136 MMOL/L (ref 136–145)
SP GR UR STRIP: 1.01 (ref 1–1.03)
SQUAMOUS #/AREA URNS HPF: ABNORMAL /HPF
T&S EXPIRATION DATE: NORMAL
TRANS CELLS #/AREA URNS HPF: ABNORMAL /HPF
UROBILINOGEN UR QL STRIP: ABNORMAL
WBC # UR STRIP: ABNORMAL /HPF

## 2023-05-22 PROCEDURE — 96361 HYDRATE IV INFUSION ADD-ON: CPT

## 2023-05-22 PROCEDURE — 96360 HYDRATION IV INFUSION INIT: CPT | Performed by: OBSTETRICS & GYNECOLOGY

## 2023-05-22 PROCEDURE — 96361 HYDRATE IV INFUSION ADD-ON: CPT | Performed by: OBSTETRICS & GYNECOLOGY

## 2023-05-22 PROCEDURE — 96360 HYDRATION IV INFUSION INIT: CPT

## 2023-05-22 RX ORDER — SODIUM CHLORIDE, SODIUM LACTATE, POTASSIUM CHLORIDE, CALCIUM CHLORIDE 600; 310; 30; 20 MG/100ML; MG/100ML; MG/100ML; MG/100ML
250 INJECTION, SOLUTION INTRAVENOUS CONTINUOUS
Status: DISCONTINUED | OUTPATIENT
Start: 2023-05-22 | End: 2023-05-22 | Stop reason: HOSPADM

## 2023-05-22 RX ORDER — SODIUM CHLORIDE, SODIUM LACTATE, POTASSIUM CHLORIDE, CALCIUM CHLORIDE 600; 310; 30; 20 MG/100ML; MG/100ML; MG/100ML; MG/100ML
500 INJECTION, SOLUTION INTRAVENOUS 2 TIMES WEEKLY
Status: CANCELLED | OUTPATIENT
Start: 2023-05-22

## 2023-05-22 RX ORDER — SODIUM CHLORIDE, SODIUM LACTATE, POTASSIUM CHLORIDE, CALCIUM CHLORIDE 600; 310; 30; 20 MG/100ML; MG/100ML; MG/100ML; MG/100ML
500 INJECTION, SOLUTION INTRAVENOUS 2 TIMES WEEKLY
Status: DISCONTINUED | OUTPATIENT
Start: 2023-05-22 | End: 2023-05-24 | Stop reason: HOSPADM

## 2023-05-22 RX ADMIN — SODIUM CHLORIDE, POTASSIUM CHLORIDE, SODIUM LACTATE AND CALCIUM CHLORIDE 250 ML/HR: 600; 310; 30; 20 INJECTION, SOLUTION INTRAVENOUS at 00:24

## 2023-05-22 RX ADMIN — SODIUM CHLORIDE, POTASSIUM CHLORIDE, SODIUM LACTATE AND CALCIUM CHLORIDE 500 ML/HR: 600; 310; 30; 20 INJECTION, SOLUTION INTRAVENOUS at 14:09

## 2023-05-22 NOTE — TELEPHONE ENCOUNTER
From: Khadra Khan  To: Ijeoma Briceno  Sent: 5/21/2023 4:35 PM EDT  Subject: Fall    I fell this afternoon in the basement onto the concrete floor. I’m pretty certain that I caught myself first with my hands and my right knee, then flat onto my belly. How concerned should I be? I don’t think the impact to my belly was much at all. My knee took the brunt of the fall.

## 2023-05-22 NOTE — PLAN OF CARE
Goal Outcome Evaluation:   Pt and  advised of POC and verbalized understanding.

## 2023-05-22 NOTE — OBED NOTES
[facility]  Khadra Khan  : 1980  MRN: [id number]  CSN: [id number]    OB ED Provider Note    Subjective   Chief Complaint   Patient presents with   • Fall     Pt stated she tripped over her cat at 1430 today, caught herself on knees and hands and then landing on her abdomen on concrete floor. Pt states she has an anterior placenta and it's difficult to feel baby moving.  Denies complaints of cramping or bleeding, but states she took a nap afterwards and woke up feeling sore on both sides of upper and side abdomen muscle like soreness. Pt was seen at [facility].      Khadra Khan is a 42 y.o. year old  with an Estimated Date of Delivery: 23 currently at 25w4d presenting with mild abdominal trauma. She tripped over a kitten, landing on her hands and knees.  Momentum carried her further where her abdomen hit the floor with slightly more than a glancing blow.  She denies CTX, ROM or VB. FM is not noticed, but audible movement is present via heart rate monitor.  She has a known anterior placenta.  She requires IV fluid infusions due to poor po intake following gastric sleeve placement.    Prenatal care has been with Dr. Briceno.  It has been complicated by GDM on insulin and IVF pregnancy. .    OB History    Para Term  AB Living   1 0 0 0 0 0   SAB IAB Ectopic Molar Multiple Live Births   0 0 0 0 0 0      # Outcome Date GA Lbr Santo/2nd Weight Sex Delivery Anes PTL Lv   1 Current              Past Medical History:   Diagnosis Date   • Arthritis    • Asthma     Seasonal   • Cystic fibrosis carrier    • GERD (gastroesophageal reflux disease)    • Nausea & vomiting      Past Surgical History:   Procedure Laterality Date   • GASTRIC SLEEVE LAPAROSCOPIC  2020    shekhar will   • COLONOSCOPY      GI issues while in high school.    • TONSILLECTOMY     • TRUNK SKIN LESION EXCISIONAL BIOPSY      benign lesion   • TYMPANOSTOMY TUBE PLACEMENT     • WISDOM TOOTH  "EXTRACTION         Current Facility-Administered Medications:   •  [START ON 5/22/2023] lactated ringers bolus 1,000 mL, 1,000 mL, Intravenous, Once, Leobardo Wilson MD  •  [START ON 5/22/2023] sodium chloride 0.9 % flush 10 mL, 10 mL, Intravenous, Q12H, Leobardo Wilson MD  •  sodium chloride 0.9 % flush 10 mL, 10 mL, Intravenous, PRN, Leobardo Wilson MD    No Known Allergies  Social History    Tobacco Use      Smoking status: Never      Smokeless tobacco: Never    Review of Systems   Gastrointestinal: Positive for abdominal pain.   All other systems reviewed and are negative.        Objective   /44   Pulse 76   Temp 97.9 °F (36.6 °C) (Oral)   Resp 15   Ht 168.9 cm (66.5\")   Wt (!) 138 kg (305 lb)   LMP 11/23/2022 (Exact Date)   SpO2 99%   BMI 48.49 kg/m²   General: well developed; well nourished  no acute distress   Abdomen: soft, 1+ tenderness along costal margins bilaterally 4 cm lateral to midclavicular lines; no masses  gravid    FHT's: reactive and category 1      Cervix: was checked (by RN): 0 cm / 0 % / -3   Presentation: cephalic   Contractions: irregular with runs of every 3 min initially, resolving after IV fluids.   Chest: Unlabored respirations    CV:  RRR   Ext:   No C/C/E   Back: CVA tenderness is deferred bilateral        Prenatal Labs  Lab Results   Component Value Date    HGB 12.2 04/11/2023    RUBELLAABIGG 2.18 02/09/2023    HEPBSAG Negative 02/09/2023    ABORH O Positive 09/14/2020    ABSCRN Negative 02/09/2023    [id number] Non Reactive 02/09/2023    HEPCVIRUSABY <0.1 02/09/2023     (H) 04/11/2023    URINECX Final report 02/09/2023    CHLAMNAA Negative 02/09/2023    NGONORRHON Negative 02/09/2023       Current Labs Reviewed   CBC w/ diff:   Lab Results   Component Value Date    WBC 12.44 (H) 05/21/2023    NEUTRORELPCT 72.7 05/21/2023    AUTOIGPER 0.7 (H) 05/21/2023    LYMPHORELPCT 21.4 05/21/2023    MONORELPCT 4.0 (L) 05/21/2023    EOSRELPCT 1.0 05/21/2023    BASORELPCT 0.2 " 05/21/2023    HGB 11.1 (L) 05/21/2023    HCT 33.1 (L) 05/21/2023    MCV 85.8 05/21/2023    RDW 13.0 05/21/2023     05/21/2023     CMP:   Lab Results   Component Value Date     05/21/2023    K 3.9 05/21/2023     05/21/2023    CO2 25.6 05/21/2023    BUN 8 05/21/2023    CREATININE 0.81 05/21/2023    GLUCOSE 111 (H) 05/21/2023    ALBUMIN 3.2 (L) 05/21/2023    CALCIUM 9.1 05/21/2023    AST 14 05/21/2023    ALT 16 05/21/2023    BILITOT <0.2 05/21/2023     UA:    Lab Results   Component Value Date    SQUAMEPIUA 13-20 (A) 05/21/2023    SPECGRAVUR 1.015 05/21/2023    KETONESU Trace (A) 05/21/2023    BLOODU Negative 05/21/2023    LEUKOCYTESUR Trace (A) 05/21/2023    NITRITEU Negative 05/21/2023    RBCUA None Seen 05/21/2023    WBCUA 0-2 05/21/2023    BACTERIA Trace (A) 05/21/2023          Assessment   1. IUP at 25w4d  2. Abdominal trauma- mild based upon history.  Given her response to fluids, I think her uterine activity was secondary to dehydration rather than due to impact.  3. GDM- euglycemic     Plan   1. D/C home with instructions to return for acute worsening of pain, bleeding, or any other acute changes.  Keep regularly scheduled prenatal appointments.      Leobardo Wilson MD  5/21/2023  23:12 EDT

## 2023-05-23 ENCOUNTER — ROUTINE PRENATAL (OUTPATIENT)
Dept: OBSTETRICS AND GYNECOLOGY | Age: 43
End: 2023-05-23
Payer: COMMERCIAL

## 2023-05-23 VITALS — DIASTOLIC BLOOD PRESSURE: 70 MMHG | WEIGHT: 293 LBS | BODY MASS INDEX: 48.49 KG/M2 | SYSTOLIC BLOOD PRESSURE: 120 MMHG

## 2023-05-23 DIAGNOSIS — Z13.89 SCREENING FOR BLOOD OR PROTEIN IN URINE: Primary | ICD-10-CM

## 2023-05-23 DIAGNOSIS — O09.523 MULTIGRAVIDA OF ADVANCED MATERNAL AGE IN THIRD TRIMESTER: ICD-10-CM

## 2023-05-23 LAB
BACTERIA SPEC AEROBE CULT: NORMAL
GLUCOSE UR STRIP-MCNC: NEGATIVE MG/DL
PROT UR STRIP-MCNC: NEGATIVE MG/DL

## 2023-05-23 NOTE — PROGRESS NOTES
Chief Complaint   Patient presents with   • Routine Prenatal Visit     CC: ob check, 25w6d, no complaints     Khadra Khan had a fall over the weekend and went to L&D. She had monitoring there that was normal. She feels some movement but not consistently. She had some ctx on L&D that resolved with hydration. She is trying as best she can to keep up with oral hydration.  Her fastings are elevated. She notes that she sometimes wakes up hungry in the middle of the night and has a snack, encouraged her to have snack before bed with insulin. Increasing nighttime long acting to 18 units  15 units with all meals (was not taking lunchtime insulin). If not having much breakfast (just protein shake etc) then skip short acting with that meal, as she has had some lows after light breakfast when she has taken insulin.  Continue 35 units longacting with breakfast.   Plan for repeat growth and follow up anatomy next visit  Send logs one week    Ijeoma Briceno MD  5/23/2023  09:43 EDT

## 2023-05-25 ENCOUNTER — HOSPITAL ENCOUNTER (OUTPATIENT)
Dept: INFUSION THERAPY | Facility: HOSPITAL | Age: 43
Discharge: HOME | End: 2023-05-25
Admitting: OBSTETRICS & GYNECOLOGY
Payer: COMMERCIAL

## 2023-05-25 VITALS
OXYGEN SATURATION: 100 % | TEMPERATURE: 97.8 F | SYSTOLIC BLOOD PRESSURE: 119 MMHG | RESPIRATION RATE: 20 BRPM | HEART RATE: 96 BPM | DIASTOLIC BLOOD PRESSURE: 66 MMHG

## 2023-05-25 DIAGNOSIS — O99.280 DEHYDRATION DURING PREGNANCY: Primary | ICD-10-CM

## 2023-05-25 DIAGNOSIS — E86.0 DEHYDRATION DURING PREGNANCY: Primary | ICD-10-CM

## 2023-05-25 PROCEDURE — 96361 HYDRATE IV INFUSION ADD-ON: CPT

## 2023-05-25 PROCEDURE — 96360 HYDRATION IV INFUSION INIT: CPT

## 2023-05-25 RX ORDER — SODIUM CHLORIDE, SODIUM LACTATE, POTASSIUM CHLORIDE, CALCIUM CHLORIDE 600; 310; 30; 20 MG/100ML; MG/100ML; MG/100ML; MG/100ML
500 INJECTION, SOLUTION INTRAVENOUS 2 TIMES WEEKLY
Status: CANCELLED | OUTPATIENT
Start: 2023-05-25

## 2023-05-25 RX ORDER — SODIUM CHLORIDE, SODIUM LACTATE, POTASSIUM CHLORIDE, CALCIUM CHLORIDE 600; 310; 30; 20 MG/100ML; MG/100ML; MG/100ML; MG/100ML
500 INJECTION, SOLUTION INTRAVENOUS 2 TIMES WEEKLY
Status: DISCONTINUED | OUTPATIENT
Start: 2023-05-25 | End: 2023-05-27 | Stop reason: HOSPADM

## 2023-05-25 RX ADMIN — SODIUM CHLORIDE, POTASSIUM CHLORIDE, SODIUM LACTATE AND CALCIUM CHLORIDE 500 ML/HR: 600; 310; 30; 20 INJECTION, SOLUTION INTRAVENOUS at 13:34

## 2023-05-28 ENCOUNTER — PATIENT MESSAGE (OUTPATIENT)
Dept: OBSTETRICS AND GYNECOLOGY | Age: 43
End: 2023-05-28

## 2023-05-30 ENCOUNTER — HOSPITAL ENCOUNTER (OUTPATIENT)
Dept: INFUSION THERAPY | Facility: HOSPITAL | Age: 43
Discharge: HOME | End: 2023-05-30
Admitting: OBSTETRICS & GYNECOLOGY

## 2023-05-30 ENCOUNTER — TELEPHONE (OUTPATIENT)
Dept: OBSTETRICS AND GYNECOLOGY | Age: 43
End: 2023-05-30
Payer: COMMERCIAL

## 2023-05-30 VITALS
DIASTOLIC BLOOD PRESSURE: 69 MMHG | HEART RATE: 96 BPM | RESPIRATION RATE: 16 BRPM | TEMPERATURE: 96.9 F | SYSTOLIC BLOOD PRESSURE: 128 MMHG | OXYGEN SATURATION: 99 %

## 2023-05-30 DIAGNOSIS — E86.0 DEHYDRATION DURING PREGNANCY: Primary | ICD-10-CM

## 2023-05-30 DIAGNOSIS — O99.280 DEHYDRATION DURING PREGNANCY: Primary | ICD-10-CM

## 2023-05-30 PROCEDURE — 96361 HYDRATE IV INFUSION ADD-ON: CPT

## 2023-05-30 PROCEDURE — 96360 HYDRATION IV INFUSION INIT: CPT

## 2023-05-30 RX ORDER — INSULIN LISPRO 200 [IU]/ML
15 INJECTION, SOLUTION SUBCUTANEOUS
Qty: 7 ML | Refills: 0 | Status: SHIPPED | OUTPATIENT
Start: 2023-05-30 | End: 2023-06-30

## 2023-05-30 RX ORDER — INSULIN HUMAN 100 [IU]/ML
INJECTION, SUSPENSION SUBCUTANEOUS
Qty: 6 EACH | Refills: 6 | Status: SHIPPED | OUTPATIENT
Start: 2023-05-30

## 2023-05-30 RX ORDER — SODIUM CHLORIDE, SODIUM LACTATE, POTASSIUM CHLORIDE, CALCIUM CHLORIDE 600; 310; 30; 20 MG/100ML; MG/100ML; MG/100ML; MG/100ML
500 INJECTION, SOLUTION INTRAVENOUS 2 TIMES WEEKLY
Status: CANCELLED | OUTPATIENT
Start: 2023-05-30

## 2023-05-30 RX ORDER — SODIUM CHLORIDE, SODIUM LACTATE, POTASSIUM CHLORIDE, CALCIUM CHLORIDE 600; 310; 30; 20 MG/100ML; MG/100ML; MG/100ML; MG/100ML
1000 INJECTION, SOLUTION INTRAVENOUS 2 TIMES WEEKLY
Status: DISCONTINUED | OUTPATIENT
Start: 2023-05-30 | End: 2023-06-01 | Stop reason: HOSPADM

## 2023-05-30 RX ADMIN — SODIUM CHLORIDE, POTASSIUM CHLORIDE, SODIUM LACTATE AND CALCIUM CHLORIDE 1000 ML: 600; 310; 30; 20 INJECTION, SOLUTION INTRAVENOUS at 14:27

## 2023-05-30 NOTE — TELEPHONE ENCOUNTER
From: Khadra Khan  To: Ijeoma Briceno  Sent: 5/28/2023 10:51 PM EDT  Subject: Insulin refills     I am unable to refill the Humulin N or Humalog insulin and mini pen needles. I assume my insurance won’t let me refill it because we changed the amounts I am using. They are saying it is too soon to refill.     Hope you have/ had a greg holiday weekend!

## 2023-05-30 NOTE — TELEPHONE ENCOUNTER
I called the pharmacy they are going to call her insurance to see if they can get an override, so her insulin can be filled, I also spoke with pt, she is aware of what the pharmacy is doing. I advise pt to call the pharmacy back in about an hour or so and see if were meds were filled and to call us back with any issues. Thanks

## 2023-05-31 NOTE — TELEPHONE ENCOUNTER
Called [facility], they had to iorder the Humulin N and should be in store this am, I have asked them to keep anextra vial in store so pt doesn't run out and they are calling ins to update dosages, pen needles available and pt notified

## 2023-06-01 ENCOUNTER — PATIENT MESSAGE (OUTPATIENT)
Dept: OBSTETRICS AND GYNECOLOGY | Age: 43
End: 2023-06-01

## 2023-06-02 ENCOUNTER — HOSPITAL ENCOUNTER (OUTPATIENT)
Facility: HOSPITAL | Age: 43
Discharge: HOME | End: 2023-06-02
Attending: OBSTETRICS & GYNECOLOGY | Admitting: OBSTETRICS & GYNECOLOGY
Payer: COMMERCIAL

## 2023-06-02 ENCOUNTER — TELEPHONE (OUTPATIENT)
Dept: OBSTETRICS AND GYNECOLOGY | Age: 43
End: 2023-06-02

## 2023-06-02 ENCOUNTER — HOSPITAL ENCOUNTER (OUTPATIENT)
Dept: INFUSION THERAPY | Facility: HOSPITAL | Age: 43
Discharge: HOME | End: 2023-06-02
Payer: COMMERCIAL

## 2023-06-02 VITALS
SYSTOLIC BLOOD PRESSURE: 108 MMHG | TEMPERATURE: 98.1 F | RESPIRATION RATE: 16 BRPM | DIASTOLIC BLOOD PRESSURE: 64 MMHG | OXYGEN SATURATION: 98 % | HEIGHT: 67 IN | HEART RATE: 83 BPM | WEIGHT: 293 LBS | BODY MASS INDEX: 45.99 KG/M2

## 2023-06-02 VITALS
DIASTOLIC BLOOD PRESSURE: 54 MMHG | HEART RATE: 97 BPM | RESPIRATION RATE: 18 BRPM | OXYGEN SATURATION: 99 % | TEMPERATURE: 97.5 F | SYSTOLIC BLOOD PRESSURE: 129 MMHG

## 2023-06-02 DIAGNOSIS — E86.0 DEHYDRATION DURING PREGNANCY: Primary | ICD-10-CM

## 2023-06-02 DIAGNOSIS — O99.280 DEHYDRATION DURING PREGNANCY: Primary | ICD-10-CM

## 2023-06-02 LAB — GLUCOSE BLDC GLUCOMTR-MCNC: 104 MG/DL (ref 70–130)

## 2023-06-02 PROCEDURE — 96360 HYDRATION IV INFUSION INIT: CPT

## 2023-06-02 PROCEDURE — G0463 HOSPITAL OUTPT CLINIC VISIT: HCPCS

## 2023-06-02 PROCEDURE — 59025 FETAL NON-STRESS TEST: CPT

## 2023-06-02 PROCEDURE — 96361 HYDRATE IV INFUSION ADD-ON: CPT

## 2023-06-02 PROCEDURE — 82948 REAGENT STRIP/BLOOD GLUCOSE: CPT

## 2023-06-02 RX ORDER — SODIUM CHLORIDE, SODIUM LACTATE, POTASSIUM CHLORIDE, CALCIUM CHLORIDE 600; 310; 30; 20 MG/100ML; MG/100ML; MG/100ML; MG/100ML
500 INJECTION, SOLUTION INTRAVENOUS 2 TIMES WEEKLY
Status: DISCONTINUED | OUTPATIENT
Start: 2023-06-02 | End: 2023-06-04 | Stop reason: HOSPADM

## 2023-06-02 RX ORDER — FERROUS SULFATE 325(65) MG
325 TABLET ORAL
COMMUNITY

## 2023-06-02 RX ORDER — SODIUM CHLORIDE, SODIUM LACTATE, POTASSIUM CHLORIDE, CALCIUM CHLORIDE 600; 310; 30; 20 MG/100ML; MG/100ML; MG/100ML; MG/100ML
500 INJECTION, SOLUTION INTRAVENOUS 2 TIMES WEEKLY
Status: CANCELLED | OUTPATIENT
Start: 2023-06-02

## 2023-06-02 RX ADMIN — SODIUM CHLORIDE, POTASSIUM CHLORIDE, SODIUM LACTATE AND CALCIUM CHLORIDE 500 ML/HR: 600; 310; 30; 20 INJECTION, SOLUTION INTRAVENOUS at 14:29

## 2023-06-02 NOTE — PROGRESS NOTES
1643-Patient reports her OB office returned call to her while here in [facility] and recommend that she go to L&D for her blood sugar to be evaluated after completion of IVF's. IV left in place, Patient transferred to L&D via wheelchair.

## 2023-06-02 NOTE — TELEPHONE ENCOUNTER
Provider: DR HASSAN  Caller: SANJEEV PRITCHARD   Relationship to Patient: SELF  Pharmacy: [facility]  Phone Number: [phone]  Reason for Call: PTS BLOOD SUGAR  AND SHE HAS TAKEN BOTH INSULIN DOSES THIS MORNING AND ITS BEEN 2 HOURS SINCE SHE HAS EATEN  SHE PHYSICALLY DOESN'T FEEL RIGHT AND PT WOULD LIKE TO KNOW IF SHE SHOULD TAKE ANOTHER DOSE OR JUST WAIT IT OUT PLEASE CALL

## 2023-06-02 NOTE — OBED NOTES
[facility]  Khadra Khan  : 1980  MRN: [id number]  CSN: [id number]    OB ED Provider Note    Subjective   Chief Complaint   Patient presents with   • Hyperglycemia      at 27.2 presents to [facility] as known GDM on insulin, states 2 hour blood sugar after breakfast today was 174, and was advised to come to [facility] by OB office     Khadra Khan is a 42 y.o. year old  with an Estimated Date of Delivery: 23 currently at 27w2d presenting with elevated 2 hour postprandial BG to 174 this morning following eating [employer]. She took her recommended dose of short acting insulin.  Accucheck in the MICHAEL was 104. She is without other complaints.  She denies CTX, ROM or VB.  FM is present.    Prenatal care has been with Dr. Briceno.  It has been complicated by GDM on insulin and IVF pregnancy and gastric sleeve patient requiring IV fluid infusions every several weeks.    OB History    Para Term  AB Living   1 0 0 0 0 0   SAB IAB Ectopic Molar Multiple Live Births   0 0 0 0 0 0      # Outcome Date GA Lbr Santo/2nd Weight Sex Delivery Anes PTL Lv   1 Current              Past Medical History:   Diagnosis Date   • Arthritis    • Asthma     Seasonal   • Cystic fibrosis carrier    • GERD (gastroesophageal reflux disease)    • Gestational diabetes    • Nausea & vomiting      Past Surgical History:   Procedure Laterality Date   • GASTRIC SLEEVE LAPAROSCOPIC  2020    shekhar will   • COLONOSCOPY      GI issues while in high school.    • TONSILLECTOMY     • TRUNK SKIN LESION EXCISIONAL BIOPSY      benign lesion   • TYMPANOSTOMY TUBE PLACEMENT     • WISDOM TOOTH EXTRACTION       No current facility-administered medications for this encounter.    Facility-Administered Medications Ordered in Other Encounters:   •  lactated ringers infusion, 500 mL/hr, Intravenous, Once per day on Mon Thu, Ijeoma Briceno MD, Stopped at 23 2996    No Known Allergies  Social History    Tobacco Use       "Smoking status: Never      Smokeless tobacco: Never    Review of Systems   All other systems reviewed and are negative.        Objective   /64 (BP Location: Right arm, Patient Position: Lying)   Pulse 83   Temp 98.1 °F (36.7 °C) (Oral)   Resp 16   Ht 168.9 cm (66.5\")   Wt (!) 141 kg (310 lb)   LMP 11/23/2022 (Exact Date)   SpO2 98%   BMI 49.29 kg/m²   General: well developed; well nourished  no acute distress   Abdomen: soft, non-tender; no masses  gravid    FHT's: reactive and category 1      Cervix: was not checked.   Presentation: unable to appreciate   Contractions: none   Chest: Unlabored respirations    CV:  RRR   Ext:   No C/C/E   Back: CVA tenderness is deferred bilateral        Prenatal Labs  Lab Results   Component Value Date    HGB 11.1 (L) 05/21/2023    RUBELLAABIGG 2.18 02/09/2023    HEPBSAG Negative 02/09/2023    ABORH O Positive 09/14/2020    ABSCRN Negative 05/21/2023    [id number] Non Reactive 02/09/2023    HEPCVIRUSABY <0.1 02/09/2023     (H) 04/11/2023    URINECX >100,000 CFU/mL Mixed Alyx Isolated 05/21/2023    CHLAMNAA Negative 02/09/2023    NGONORRHON Negative 02/09/2023       Current Labs Reviewed   Pregnancy 28 week labs:   Lab Results   Component Value Date    HGB 11.1 (L) 05/21/2023    HCT 33.1 (L) 05/21/2023     (H) 04/11/2023     Prenatal Panel:   Lab Results   Component Value Date    HGB 11.1 (L) 05/21/2023    RUBELLAABIGG 2.18 02/09/2023    HEPBSAG Negative 02/09/2023    ABORH O Positive 09/14/2020    ABSCRN Negative 05/21/2023    [id number] Non Reactive 02/09/2023    HEPCVIRUSABY <0.1 02/09/2023     (H) 04/11/2023    URINECX >100,000 CFU/mL Mixed Alyx Isolated 05/21/2023    CHLAMNAA Negative 02/09/2023    NGONORRHON Negative 02/09/2023          Assessment   1. IUP at 27w2d  2. Gestational DM- euglycemic following sliding scale insulin.     Plan   1. I encouraged Ms. Khan to limit carbs to prevent blood glucose spikes. I explained that spiking " hyperglycemia increases the chances of fetal macrosomia leading to delivery difficulties and postpartum hypoglycemia.  Return for worsening symptoms, acute changes.  Keep regularly scheduled prenatal appointments.      Leobardo Wilson MD  6/2/2023  18:15 EDT

## 2023-06-06 ENCOUNTER — HOSPITAL ENCOUNTER (OUTPATIENT)
Dept: INFUSION THERAPY | Facility: HOSPITAL | Age: 43
Discharge: HOME | End: 2023-06-06
Admitting: OBSTETRICS & GYNECOLOGY
Payer: COMMERCIAL

## 2023-06-06 VITALS
OXYGEN SATURATION: 100 % | TEMPERATURE: 97.3 F | DIASTOLIC BLOOD PRESSURE: 69 MMHG | RESPIRATION RATE: 20 BRPM | HEART RATE: 98 BPM | SYSTOLIC BLOOD PRESSURE: 125 MMHG

## 2023-06-06 DIAGNOSIS — O99.280 DEHYDRATION DURING PREGNANCY: Primary | ICD-10-CM

## 2023-06-06 DIAGNOSIS — E86.0 DEHYDRATION DURING PREGNANCY: Primary | ICD-10-CM

## 2023-06-06 PROCEDURE — 96360 HYDRATION IV INFUSION INIT: CPT

## 2023-06-06 PROCEDURE — 96361 HYDRATE IV INFUSION ADD-ON: CPT

## 2023-06-06 RX ORDER — SODIUM CHLORIDE, SODIUM LACTATE, POTASSIUM CHLORIDE, CALCIUM CHLORIDE 600; 310; 30; 20 MG/100ML; MG/100ML; MG/100ML; MG/100ML
500 INJECTION, SOLUTION INTRAVENOUS 2 TIMES WEEKLY
Status: DISCONTINUED | OUTPATIENT
Start: 2023-06-06 | End: 2023-06-08 | Stop reason: HOSPADM

## 2023-06-06 RX ORDER — SODIUM CHLORIDE, SODIUM LACTATE, POTASSIUM CHLORIDE, CALCIUM CHLORIDE 600; 310; 30; 20 MG/100ML; MG/100ML; MG/100ML; MG/100ML
500 INJECTION, SOLUTION INTRAVENOUS 2 TIMES WEEKLY
Status: CANCELLED | OUTPATIENT
Start: 2023-06-06

## 2023-06-06 RX ADMIN — SODIUM CHLORIDE, POTASSIUM CHLORIDE, SODIUM LACTATE AND CALCIUM CHLORIDE 500 ML/HR: 600; 310; 30; 20 INJECTION, SOLUTION INTRAVENOUS at 08:46

## 2023-06-09 ENCOUNTER — HOSPITAL ENCOUNTER (OUTPATIENT)
Dept: INFUSION THERAPY | Facility: HOSPITAL | Age: 43
Discharge: HOME | End: 2023-06-09
Payer: COMMERCIAL

## 2023-06-09 VITALS
OXYGEN SATURATION: 99 % | HEART RATE: 95 BPM | DIASTOLIC BLOOD PRESSURE: 77 MMHG | SYSTOLIC BLOOD PRESSURE: 144 MMHG | RESPIRATION RATE: 16 BRPM | TEMPERATURE: 97.5 F

## 2023-06-09 DIAGNOSIS — O99.280 DEHYDRATION DURING PREGNANCY: Primary | ICD-10-CM

## 2023-06-09 DIAGNOSIS — E86.0 DEHYDRATION DURING PREGNANCY: Primary | ICD-10-CM

## 2023-06-09 PROCEDURE — 96360 HYDRATION IV INFUSION INIT: CPT

## 2023-06-09 PROCEDURE — 96361 HYDRATE IV INFUSION ADD-ON: CPT

## 2023-06-09 RX ORDER — SODIUM CHLORIDE, SODIUM LACTATE, POTASSIUM CHLORIDE, CALCIUM CHLORIDE 600; 310; 30; 20 MG/100ML; MG/100ML; MG/100ML; MG/100ML
500 INJECTION, SOLUTION INTRAVENOUS 2 TIMES WEEKLY
Status: CANCELLED | OUTPATIENT
Start: 2023-06-09

## 2023-06-09 RX ORDER — SODIUM CHLORIDE, SODIUM LACTATE, POTASSIUM CHLORIDE, CALCIUM CHLORIDE 600; 310; 30; 20 MG/100ML; MG/100ML; MG/100ML; MG/100ML
500 INJECTION, SOLUTION INTRAVENOUS 2 TIMES WEEKLY
Status: DISCONTINUED | OUTPATIENT
Start: 2023-06-09 | End: 2023-06-11 | Stop reason: HOSPADM

## 2023-06-09 RX ADMIN — SODIUM CHLORIDE, POTASSIUM CHLORIDE, SODIUM LACTATE AND CALCIUM CHLORIDE 500 ML/HR: 600; 310; 30; 20 INJECTION, SOLUTION INTRAVENOUS at 08:59

## 2023-06-13 ENCOUNTER — ROUTINE PRENATAL (OUTPATIENT)
Dept: OBSTETRICS AND GYNECOLOGY | Age: 43
End: 2023-06-13
Payer: COMMERCIAL

## 2023-06-13 ENCOUNTER — TELEPHONE (OUTPATIENT)
Dept: OBSTETRICS AND GYNECOLOGY | Facility: CLINIC | Age: 43
End: 2023-06-13
Payer: COMMERCIAL

## 2023-06-13 ENCOUNTER — HOSPITAL ENCOUNTER (OUTPATIENT)
Dept: INFUSION THERAPY | Facility: HOSPITAL | Age: 43
Discharge: HOME | End: 2023-06-13
Admitting: OBSTETRICS & GYNECOLOGY
Payer: COMMERCIAL

## 2023-06-13 VITALS — BODY MASS INDEX: 48.81 KG/M2 | WEIGHT: 293 LBS | DIASTOLIC BLOOD PRESSURE: 80 MMHG | SYSTOLIC BLOOD PRESSURE: 110 MMHG

## 2023-06-13 VITALS
SYSTOLIC BLOOD PRESSURE: 127 MMHG | TEMPERATURE: 98.1 F | DIASTOLIC BLOOD PRESSURE: 68 MMHG | RESPIRATION RATE: 20 BRPM | HEART RATE: 108 BPM | OXYGEN SATURATION: 97 %

## 2023-06-13 DIAGNOSIS — Z13.89 SCREENING FOR BLOOD OR PROTEIN IN URINE: Primary | ICD-10-CM

## 2023-06-13 DIAGNOSIS — E86.0 DEHYDRATION DURING PREGNANCY: ICD-10-CM

## 2023-06-13 DIAGNOSIS — O99.280 DEHYDRATION DURING PREGNANCY: ICD-10-CM

## 2023-06-13 DIAGNOSIS — E86.0 DEHYDRATION DURING PREGNANCY: Primary | ICD-10-CM

## 2023-06-13 DIAGNOSIS — O99.280 DEHYDRATION DURING PREGNANCY: Primary | ICD-10-CM

## 2023-06-13 DIAGNOSIS — Z98.890 HISTORY OF GASTRIC SURGERY: ICD-10-CM

## 2023-06-13 DIAGNOSIS — O09.92 HIGH-RISK PREGNANCY IN SECOND TRIMESTER: ICD-10-CM

## 2023-06-13 LAB
BILIRUB BLD-MCNC: NEGATIVE MG/DL
GLUCOSE UR STRIP-MCNC: NEGATIVE MG/DL
KETONES UR QL: ABNORMAL
LEUKOCYTE EST, POC: ABNORMAL
NITRITE UR-MCNC: NEGATIVE MG/ML
PH UR: 7.5 [PH] (ref 5–8)
PROT UR STRIP-MCNC: NEGATIVE MG/DL
RBC # UR STRIP: NEGATIVE /UL
SP GR UR: 1.02 (ref 1–1.03)
UROBILINOGEN UR QL: NORMAL

## 2023-06-13 PROCEDURE — 96365 THER/PROPH/DIAG IV INF INIT: CPT

## 2023-06-13 PROCEDURE — 96366 THER/PROPH/DIAG IV INF ADDON: CPT

## 2023-06-13 RX ORDER — SODIUM CHLORIDE, SODIUM LACTATE, POTASSIUM CHLORIDE, CALCIUM CHLORIDE 600; 310; 30; 20 MG/100ML; MG/100ML; MG/100ML; MG/100ML
500 INJECTION, SOLUTION INTRAVENOUS 2 TIMES WEEKLY
Status: CANCELLED | OUTPATIENT
Start: 2023-06-13

## 2023-06-13 RX ORDER — SODIUM CHLORIDE, SODIUM LACTATE, POTASSIUM CHLORIDE, CALCIUM CHLORIDE 600; 310; 30; 20 MG/100ML; MG/100ML; MG/100ML; MG/100ML
500 INJECTION, SOLUTION INTRAVENOUS 2 TIMES WEEKLY
Status: DISCONTINUED | OUTPATIENT
Start: 2023-06-13 | End: 2023-06-15 | Stop reason: HOSPADM

## 2023-06-13 RX ADMIN — SODIUM CHLORIDE, POTASSIUM CHLORIDE, SODIUM LACTATE AND CALCIUM CHLORIDE 500 ML/HR: 600; 310; 30; 20 INJECTION, SOLUTION INTRAVENOUS at 11:41

## 2023-06-13 NOTE — PROGRESS NOTES
Chief Complaint   Patient presents with    Routine Prenatal Visit     Cc: ob check with repeat anatomy - daily insulin - reports good FM , check ferritin, B12, folate next visit         Khadra Khan has been doing well, feeling consistent fetal movement now. No LOF or VB. No ctx. Taking her insulin regularly but some missing values on log. Notes that when she eats out, glucose is always higher. No reported lows overnight    Enter insulin dosing Insulin dosing   Insulin/breakfast: 15 u (6/13/2023  1:15 PM)  Insulin/Lunch: 15 u (6/13/2023  1:15 PM)  Insulin/Dinner: 15 u (6/13/2023  1:15 PM)  Long Insulin AM: 37 u (6/13/2023  1:15 PM)  Insulin long PM/HS : 20 u (6/13/2023  1:15 PM)    Increased AM And PM insulin, occasional elevated lunch and fasting value  Encouraged diet compliance  Her GDM is complicated by hx of gastric sleeve and inability to tolerate meals at times. Plan to have her see MFM as well, may co-manage insulin along with MFM  Growth normal but incomplete heart views still  Nutrient labs for hx gastric surgery today    Ijeoma Briceno MD  6/13/2023  13:17 EDT

## 2023-06-13 NOTE — TELEPHONE ENCOUNTER
Call placed to Khadra to review [facility] diabetes management. Pt reports she is currently checking her blood sugars 4 times a day: fasting and 2 hours after eating. Discussed with pt MFM management and checking blood sugars 7 times a day: before meals, one hour after meals and before bedtime snack. Pt agreeable to this adjustment. Discussed carb goals and eating every 2-3 hours with 30g carbs for breakfast, 45g carbs for lunch and dinner and 10-15g carbs with snacks in between meals and at bedtime. Pt verbalized understanding. Glucose goals of 60-90 before meals and  one hour after meals were reviewed. Discussed with patient that if glucose values are consistently outside of the targeted range, MFM will discuss starting her on medications. Pt currently taking Humalin 37/20 and Humalog 0/15/15, last adjustments made today from Dr. Briceno. Encouraged pt to start checking blood sugars 7 times a day and bring glucose logs to her upcoming appointment. Khadra verbalized understanding.   [employer] message sent to patient with MFM glucose log attached. Will provide patient with [facility] folder with diabetes packet, hypoglycemia protocol and extra glucose logs at time of appointment. All questions answered at time of call and encouraged patient to call this RN with any additional questions prior to her appointment. [facility] looks forward to seeing pt on 6/28 at 10AM.

## 2023-06-14 LAB
FERRITIN SERPL-MCNC: 15.9 NG/ML (ref 13–150)
FOLATE SERPL-MCNC: >20 NG/ML (ref 4.78–24.2)
VIT B12 SERPL-MCNC: 403 PG/ML (ref 211–946)

## 2023-06-14 RX ORDER — ONDANSETRON 4 MG/1
TABLET, ORALLY DISINTEGRATING ORAL
Qty: 50 TABLET | Refills: 1 | Status: SHIPPED | OUTPATIENT
Start: 2023-06-14

## 2023-06-14 NOTE — PROGRESS NOTES
Your B12 and folate levels are normal.  Do not need additional supplementation at this time.  Your iron level is on the low end, I do not think you need IV infusions of iron as your blood count is still normal but continue taking oral iron    Ijeoma Briceno MD  6/14/2023  13:44 EDT

## 2023-06-15 ENCOUNTER — HOSPITAL ENCOUNTER (EMERGENCY)
Facility: HOSPITAL | Age: 43
Discharge: HOME | End: 2023-06-15
Attending: OBSTETRICS & GYNECOLOGY | Admitting: OBSTETRICS & GYNECOLOGY
Payer: COMMERCIAL

## 2023-06-15 VITALS
DIASTOLIC BLOOD PRESSURE: 52 MMHG | HEART RATE: 93 BPM | HEIGHT: 66 IN | BODY MASS INDEX: 47.09 KG/M2 | SYSTOLIC BLOOD PRESSURE: 110 MMHG | WEIGHT: 293 LBS | TEMPERATURE: 98.3 F | RESPIRATION RATE: 17 BRPM

## 2023-06-15 PROCEDURE — 59025 FETAL NON-STRESS TEST: CPT

## 2023-06-15 NOTE — NON STRESS TEST
Khadra Khan, a  at 29w1d with an MEHRDAD of 2023, Date entered prior to episode creation, was seen at [facility] for a nonstress test.    Chief Complaint   Patient presents with   • Decreased Fetal Movement     Pt to [facility] with c/o not feeling any movement since yesterday morning.        Patient Active Problem List   Diagnosis   • Morbid obesity with BMI of 50.0-59.9, adult   • Male factor infertility   • Cystic fibrosis carrier   • Pregnancy   • History of sleeve gastrectomy   • AMA (advanced maternal age) multigravida 35+   • Pregnancy conceived through in vitro fertilization   • Elevated hemoglobin A1c   • Subclinical hypothyroidism   • High-risk pregnancy in second trimester   • Dehydration during pregnancy   • Pregnancy resulting from in vitro fertilization in second trimester   • Gestational diabetes mellitus (GDM) in childbirth, insulin controlled       Start Time: 0930  Stop Time: 1030    Interpretation A  Nonstress Test Interpretation A: Reactive

## 2023-06-16 ENCOUNTER — HOSPITAL ENCOUNTER (OUTPATIENT)
Dept: INFUSION THERAPY | Facility: HOSPITAL | Age: 43
Discharge: HOME | End: 2023-06-16
Payer: COMMERCIAL

## 2023-06-16 VITALS
SYSTOLIC BLOOD PRESSURE: 127 MMHG | OXYGEN SATURATION: 99 % | DIASTOLIC BLOOD PRESSURE: 74 MMHG | HEART RATE: 104 BPM | RESPIRATION RATE: 20 BRPM | TEMPERATURE: 96.4 F

## 2023-06-16 DIAGNOSIS — O99.280 DEHYDRATION DURING PREGNANCY: Primary | ICD-10-CM

## 2023-06-16 DIAGNOSIS — E86.0 DEHYDRATION DURING PREGNANCY: Primary | ICD-10-CM

## 2023-06-16 PROCEDURE — 96360 HYDRATION IV INFUSION INIT: CPT

## 2023-06-16 PROCEDURE — 96361 HYDRATE IV INFUSION ADD-ON: CPT

## 2023-06-16 RX ORDER — SODIUM CHLORIDE, SODIUM LACTATE, POTASSIUM CHLORIDE, CALCIUM CHLORIDE 600; 310; 30; 20 MG/100ML; MG/100ML; MG/100ML; MG/100ML
500 INJECTION, SOLUTION INTRAVENOUS 2 TIMES WEEKLY
OUTPATIENT
Start: 2023-06-16

## 2023-06-16 RX ORDER — SODIUM CHLORIDE, SODIUM LACTATE, POTASSIUM CHLORIDE, CALCIUM CHLORIDE 600; 310; 30; 20 MG/100ML; MG/100ML; MG/100ML; MG/100ML
500 INJECTION, SOLUTION INTRAVENOUS 2 TIMES WEEKLY
Status: DISCONTINUED | OUTPATIENT
Start: 2023-06-16 | End: 2023-06-18 | Stop reason: HOSPADM

## 2023-06-16 RX ADMIN — SODIUM CHLORIDE, POTASSIUM CHLORIDE, SODIUM LACTATE AND CALCIUM CHLORIDE 500 ML/HR: 600; 310; 30; 20 INJECTION, SOLUTION INTRAVENOUS at 14:13

## 2023-07-21 ENCOUNTER — HOSPITAL ENCOUNTER (OUTPATIENT)
Dept: INFUSION THERAPY | Facility: HOSPITAL | Age: 43
Discharge: HOME | End: 2023-07-21
Admitting: OBSTETRICS & GYNECOLOGY
Payer: COMMERCIAL

## 2023-07-21 VITALS
OXYGEN SATURATION: 99 % | DIASTOLIC BLOOD PRESSURE: 93 MMHG | SYSTOLIC BLOOD PRESSURE: 143 MMHG | RESPIRATION RATE: 22 BRPM | HEART RATE: 98 BPM | TEMPERATURE: 96.6 F

## 2023-07-21 DIAGNOSIS — O99.280 DEHYDRATION DURING PREGNANCY: Primary | ICD-10-CM

## 2023-07-21 DIAGNOSIS — E86.0 DEHYDRATION DURING PREGNANCY: Primary | ICD-10-CM

## 2023-07-21 PROCEDURE — 96361 HYDRATE IV INFUSION ADD-ON: CPT

## 2023-07-21 PROCEDURE — 96360 HYDRATION IV INFUSION INIT: CPT

## 2023-07-21 RX ORDER — SODIUM CHLORIDE, SODIUM LACTATE, POTASSIUM CHLORIDE, CALCIUM CHLORIDE 600; 310; 30; 20 MG/100ML; MG/100ML; MG/100ML; MG/100ML
500 INJECTION, SOLUTION INTRAVENOUS 2 TIMES WEEKLY
Status: DISCONTINUED | OUTPATIENT
Start: 2023-07-21 | End: 2023-07-23 | Stop reason: HOSPADM

## 2023-07-21 RX ORDER — SODIUM CHLORIDE, SODIUM LACTATE, POTASSIUM CHLORIDE, CALCIUM CHLORIDE 600; 310; 30; 20 MG/100ML; MG/100ML; MG/100ML; MG/100ML
500 INJECTION, SOLUTION INTRAVENOUS 2 TIMES WEEKLY
Status: CANCELLED | OUTPATIENT
Start: 2023-07-21

## 2023-07-21 RX ADMIN — SODIUM CHLORIDE, POTASSIUM CHLORIDE, SODIUM LACTATE AND CALCIUM CHLORIDE 500 ML/HR: 600; 310; 30; 20 INJECTION, SOLUTION INTRAVENOUS at 13:06

## 2023-07-21 RX ADMIN — SODIUM CHLORIDE, POTASSIUM CHLORIDE, SODIUM LACTATE AND CALCIUM CHLORIDE 500 ML/HR: 600; 310; 30; 20 INJECTION, SOLUTION INTRAVENOUS at 13:25

## 2023-07-24 ENCOUNTER — DOCUMENTATION (OUTPATIENT)
Dept: OBSTETRICS AND GYNECOLOGY | Facility: CLINIC | Age: 43
End: 2023-07-24
Payer: COMMERCIAL

## 2023-07-24 NOTE — PROGRESS NOTES
Weekly MFM glucose log evaluation. The following adjustments were made:    NPH:   Humalo  Metformin: 500mg BID

## 2023-07-25 ENCOUNTER — HOSPITAL ENCOUNTER (OUTPATIENT)
Dept: INFUSION THERAPY | Facility: HOSPITAL | Age: 43
Discharge: HOME | End: 2023-07-25
Admitting: OBSTETRICS & GYNECOLOGY
Payer: COMMERCIAL

## 2023-07-25 ENCOUNTER — ROUTINE PRENATAL (OUTPATIENT)
Dept: OBSTETRICS AND GYNECOLOGY | Age: 43
End: 2023-07-25
Payer: COMMERCIAL

## 2023-07-25 VITALS — BODY MASS INDEX: 52.78 KG/M2 | DIASTOLIC BLOOD PRESSURE: 86 MMHG | SYSTOLIC BLOOD PRESSURE: 122 MMHG | WEIGHT: 293 LBS

## 2023-07-25 VITALS
RESPIRATION RATE: 22 BRPM | TEMPERATURE: 96.8 F | OXYGEN SATURATION: 99 % | HEART RATE: 94 BPM | SYSTOLIC BLOOD PRESSURE: 129 MMHG | DIASTOLIC BLOOD PRESSURE: 85 MMHG

## 2023-07-25 DIAGNOSIS — E86.0 DEHYDRATION DURING PREGNANCY: Primary | ICD-10-CM

## 2023-07-25 DIAGNOSIS — O99.280 DEHYDRATION DURING PREGNANCY: Primary | ICD-10-CM

## 2023-07-25 DIAGNOSIS — Z13.89 SCREENING FOR BLOOD OR PROTEIN IN URINE: Primary | ICD-10-CM

## 2023-07-25 DIAGNOSIS — O09.813 PREGNANCY RESULTING FROM IN VITRO FERTILIZATION IN THIRD TRIMESTER: ICD-10-CM

## 2023-07-25 DIAGNOSIS — O09.92 HIGH-RISK PREGNANCY IN SECOND TRIMESTER: ICD-10-CM

## 2023-07-25 DIAGNOSIS — Z36.85 ANTENATAL SCREENING FOR STREPTOCOCCUS B: ICD-10-CM

## 2023-07-25 DIAGNOSIS — E66.01 MORBID OBESITY WITH BMI OF 50.0-59.9, ADULT: ICD-10-CM

## 2023-07-25 LAB
CLARITY, POC: CLEAR
COLOR UR: YELLOW
GLUCOSE UR STRIP-MCNC: NEGATIVE MG/DL
PROT UR STRIP-MCNC: NEGATIVE MG/DL

## 2023-07-25 PROCEDURE — 0502F SUBSEQUENT PRENATAL CARE: CPT | Performed by: OBSTETRICS & GYNECOLOGY

## 2023-07-25 PROCEDURE — 96360 HYDRATION IV INFUSION INIT: CPT

## 2023-07-25 PROCEDURE — 96361 HYDRATE IV INFUSION ADD-ON: CPT

## 2023-07-25 RX ORDER — SODIUM CHLORIDE, SODIUM LACTATE, POTASSIUM CHLORIDE, CALCIUM CHLORIDE 600; 310; 30; 20 MG/100ML; MG/100ML; MG/100ML; MG/100ML
500 INJECTION, SOLUTION INTRAVENOUS 2 TIMES WEEKLY
OUTPATIENT
Start: 2023-07-25

## 2023-07-25 RX ORDER — PROMETHAZINE HYDROCHLORIDE 25 MG/1
25 TABLET ORAL EVERY 6 HOURS PRN
Qty: 20 TABLET | Refills: 1 | Status: SHIPPED | OUTPATIENT
Start: 2023-07-25

## 2023-07-25 RX ORDER — SODIUM CHLORIDE, SODIUM LACTATE, POTASSIUM CHLORIDE, CALCIUM CHLORIDE 600; 310; 30; 20 MG/100ML; MG/100ML; MG/100ML; MG/100ML
500 INJECTION, SOLUTION INTRAVENOUS 2 TIMES WEEKLY
Status: DISCONTINUED | OUTPATIENT
Start: 2023-07-25 | End: 2023-07-27 | Stop reason: HOSPADM

## 2023-07-25 RX ADMIN — SODIUM CHLORIDE, POTASSIUM CHLORIDE, SODIUM LACTATE AND CALCIUM CHLORIDE 500 ML/HR: 600; 310; 30; 20 INJECTION, SOLUTION INTRAVENOUS at 12:51

## 2023-07-25 NOTE — PROGRESS NOTES
Chief Complaint   Patient presents with    Routine Prenatal Visit     Ob check, 34w6d, bpp today, pt c/o of swelling in hands and feet the past week that will not go down     Khadra Khan notes that her swelling has increased, has been present in her feet and ankles but now her hands are tight and feels swollen as well.   Glucose logs reviewed and recently adjusted by Dr. Owens  She notes normal movement  Some pelvic pressure now but no sig ctx    /86   Wt (!) 148 kg (327 lb)   LMP 2022 (Exact Date)   BMI 52.78 kg/m²   Well, no distress  Fundus soft, nontender  Cervix is 0.5/50/-2  1+ edema BL LE    A/P  Diagnoses and all orders for this visit:    1. Screening for blood or protein in urine (Primary)  -     POC Urinalysis Dipstick    2.  screening for streptococcus B  -     Group B Streptococcus Culture - Swab, Vaginal/Rectum    3. Pregnancy resulting from in vitro fertilization in third trimester    4. High-risk pregnancy in second trimester    5. Morbid obesity with BMI of 50.0-59.9, adult    6. Gestational diabetes mellitus (GDM) in childbirth, insulin controlled    Other orders  -     promethazine (PHENERGAN) 25 MG tablet; Take 1 tablet by mouth Every 6 (Six) Hours As Needed for Nausea or Vomiting.  Dispense: 20 tablet; Refill: 1      She had an elevated BP at infusion center but she notes that she was rushing big time and was taken immediately when she got there. Has had normal BP since and repeated today and normal both times. With her incr in swelling and wait gain over the past week concerned that she may develop gest HTN or preE, is at high risk. Plan for her to come for BP check this and next Friday.   GBS obtained today  BPP     Ijeoma Briceno MD  2023  10:51 EDT

## 2023-07-28 ENCOUNTER — TRANSCRIBE ORDERS (OUTPATIENT)
Dept: ULTRASOUND IMAGING | Facility: HOSPITAL | Age: 43
End: 2023-07-28
Payer: COMMERCIAL

## 2023-07-28 ENCOUNTER — CLINICAL SUPPORT (OUTPATIENT)
Dept: OBSTETRICS AND GYNECOLOGY | Age: 43
End: 2023-07-28
Payer: COMMERCIAL

## 2023-07-28 ENCOUNTER — TELEPHONE (OUTPATIENT)
Dept: OBSTETRICS AND GYNECOLOGY | Age: 43
End: 2023-07-28

## 2023-07-28 ENCOUNTER — HOSPITAL ENCOUNTER (OUTPATIENT)
Facility: HOSPITAL | Age: 43
Discharge: HOME | End: 2023-07-28
Attending: OBSTETRICS & GYNECOLOGY | Admitting: OBSTETRICS & GYNECOLOGY
Payer: COMMERCIAL

## 2023-07-28 ENCOUNTER — APPOINTMENT (OUTPATIENT)
Dept: GENERAL RADIOLOGY | Facility: HOSPITAL | Age: 43
End: 2023-07-28
Payer: COMMERCIAL

## 2023-07-28 VITALS
DIASTOLIC BLOOD PRESSURE: 61 MMHG | HEIGHT: 66 IN | TEMPERATURE: 98 F | SYSTOLIC BLOOD PRESSURE: 111 MMHG | RESPIRATION RATE: 18 BRPM | HEART RATE: 83 BPM | OXYGEN SATURATION: 96 % | BODY MASS INDEX: 52.78 KG/M2

## 2023-07-28 VITALS — DIASTOLIC BLOOD PRESSURE: 86 MMHG | SYSTOLIC BLOOD PRESSURE: 132 MMHG

## 2023-07-28 DIAGNOSIS — Z13.89 SCREENING FOR BLOOD OR PROTEIN IN URINE: Primary | ICD-10-CM

## 2023-07-28 LAB
BACTERIA UR QL AUTO: ABNORMAL /HPF
BILIRUB BLD-MCNC: NEGATIVE MG/DL
BILIRUB UR QL STRIP: NEGATIVE
CLARITY UR: CLEAR
CLARITY, POC: CLEAR
COLOR UR: ABNORMAL
COLOR UR: YELLOW
CREAT UR-MCNC: 176.5 MG/DL
GLUCOSE UR STRIP-MCNC: NEGATIVE MG/DL
GLUCOSE UR STRIP-MCNC: NEGATIVE MG/DL
HGB UR QL STRIP.AUTO: NEGATIVE
HYALINE CASTS UR QL AUTO: ABNORMAL /LPF
KETONES UR QL STRIP: ABNORMAL
KETONES UR QL: ABNORMAL
LEUKOCYTE EST, POC: ABNORMAL
LEUKOCYTE ESTERASE UR QL STRIP.AUTO: ABNORMAL
NITRITE UR QL STRIP: NEGATIVE
NITRITE UR-MCNC: NEGATIVE MG/ML
PH UR STRIP.AUTO: 6 [PH] (ref 5–8)
PH UR: 6.5 [PH] (ref 5–8)
PROT ?TM UR-MCNC: 20.2 MG/DL
PROT UR QL STRIP: ABNORMAL
PROT UR STRIP-MCNC: ABNORMAL MG/DL
PROT/CREAT UR: 114.4 MG/G CREA (ref 0–200)
RBC # UR STRIP: ABNORMAL /HPF
RBC # UR STRIP: NEGATIVE /UL
REF LAB TEST METHOD: ABNORMAL
SP GR UR STRIP: 1.02 (ref 1–1.03)
SP GR UR: 1.02 (ref 1–1.03)
SQUAMOUS #/AREA URNS HPF: ABNORMAL /HPF
UROBILINOGEN UR QL STRIP: ABNORMAL
UROBILINOGEN UR QL: NORMAL
WBC # UR STRIP: ABNORMAL /HPF

## 2023-07-28 PROCEDURE — 71046 X-RAY EXAM CHEST 2 VIEWS: CPT

## 2023-07-28 PROCEDURE — 99283 EMERGENCY DEPT VISIT LOW MDM: CPT | Performed by: OBSTETRICS & GYNECOLOGY

## 2023-07-28 PROCEDURE — 81001 URINALYSIS AUTO W/SCOPE: CPT | Performed by: OBSTETRICS & GYNECOLOGY

## 2023-07-28 PROCEDURE — 84156 ASSAY OF PROTEIN URINE: CPT | Performed by: OBSTETRICS & GYNECOLOGY

## 2023-07-28 PROCEDURE — 82570 ASSAY OF URINE CREATININE: CPT | Performed by: OBSTETRICS & GYNECOLOGY

## 2023-07-28 PROCEDURE — 59025 FETAL NON-STRESS TEST: CPT

## 2023-07-28 PROCEDURE — 87086 URINE CULTURE/COLONY COUNT: CPT | Performed by: OBSTETRICS & GYNECOLOGY

## 2023-07-28 NOTE — TELEPHONE ENCOUNTER
Pt came in for BP check today. Pt c/o headache, swelling of hands and ankles, and trace protein in her urine. BP was 132/82. Verified with Dr. Pardo that patient needs to go to labor and delivery for BP monitoring and lab check. Labor and delivery notified pt is on her way from [place].

## 2023-07-28 NOTE — OBED NOTES
[facility]  Khadra Khan  : 1980  MRN: [id number]  CSN: [id number]    OB ED Provider Note    Subjective   Chief Complaint   Patient presents with    Leg Swelling     [facility] with complaints of swelling that has increased in the last 2 weeks. She reports slightly elevated pressures. Reports mild headache that has gotten better as the morning has gone on with some blurred vision. Denies any RUQ pain.      Khadra Khan is a 42 y.o. year old  with an Estimated Date of Delivery: 23 currently at 35w2d presenting with 1+ week history of significant BLE and manual edema, worsening over the past 24 hours.  She is noting some exertional dyspnea over the past several days as well as some intermittently blurred vision. She denies CTX, ROM or VB. FM is present. No epigastric pain    Prenatal care has been with Dr. Briceno.  It has been complicated by GDM - A2.    OB History    Para Term  AB Living   1 0 0 0 0 0   SAB IAB Ectopic Molar Multiple Live Births   0 0 0 0 0 0      # Outcome Date GA Lbr Santo/2nd Weight Sex Delivery Anes PTL Lv   1 Current              Past Medical History:   Diagnosis Date    Arthritis     Asthma     Seasonal    Cystic fibrosis carrier     GERD (gastroesophageal reflux disease)     Gestational diabetes     Nausea & vomiting      Past Surgical History:   Procedure Laterality Date    GASTRIC SLEEVE LAPAROSCOPIC  2020    shekhar will    COLONOSCOPY      GI issues while in high school.     TONSILLECTOMY      TRUNK SKIN LESION EXCISIONAL BIOPSY      benign lesion    TYMPANOSTOMY TUBE PLACEMENT      WISDOM TOOTH EXTRACTION       No current facility-administered medications for this encounter.    No Known Allergies  Social History    Tobacco Use      Smoking status: Never      Smokeless tobacco: Never    Review of Systems   Eyes:  Positive for visual disturbance.   Respiratory:  Positive for shortness of breath.    Cardiovascular:  Positive for leg swelling.     "   Objective   /64   Pulse 96   Temp 98 °F (36.7 °C) (Oral)   Resp 18   Ht 167.6 cm (66\")   LMP 11/23/2022 (Exact Date)   SpO2 96%   BMI 52.78 kg/m²   General: well developed; well nourished  no acute distress   Abdomen: soft, non-tender; no masses  gravid    FHT's: reactive and category 1      Cervix: was not checked.   Presentation: cephalic   Contractions: every 5-7 minutes   Chest: Some labored respirations    CV:  RRR   Ext:   No C/C/3+ BLE pitting edema to knees, 2+ bilateral manual edema   Back: CVA tenderness is deferred bilateral        Prenatal Labs  Lab Results   Component Value Date    HGB 11.1 (L) 05/21/2023    RUBELLAABIGG 2.18 02/09/2023    HEPBSAG Negative 02/09/2023    ABORH O Positive 09/14/2020    ABSCRN Negative 05/21/2023    [id number] Non Reactive 02/09/2023    HEPCVIRUSABY <0.1 02/09/2023     (H) 04/11/2023    URINECX Final report 06/20/2023    CHLAMNAA Negative 02/09/2023    NGONORRHON Negative 02/09/2023       Current Labs Reviewed   UA:    Lab Results   Component Value Date    SQUAMEPIUA 7-12 (A) 07/28/2023    SPECGRAVUR 1.025 07/28/2023    KETONESU 15 mg/dL (1+) (A) 07/28/2023    BLOODU Negative 07/28/2023    LEUKOCYTESUR Small (1+) (A) 07/28/2023    NITRITEU Negative 07/28/2023    RBCUA 0-2 07/28/2023    WBCUA 6-12 (A) 07/28/2023    BACTERIA None Seen 07/28/2023     Urine P:C 114    XR CHEST PA AND LATERAL-     HISTORY: Female who is 42 years-old,  edema, dyspnea     TECHNIQUE: Frontal and lateral views of the chest     COMPARISON: None available     FINDINGS: Heart, mediastinum and pulmonary vasculature are unremarkable.  No focal pulmonary consolidation, pleural effusion, or pneumothorax. No  acute osseous process.     IMPRESSION:  No evidence for acute pulmonary process. Follow-up as  clinical indications persist.     This report was finalized on 7/28/2023 1:00 PM by Dr. Christian Mcgregor M.D.          Assessment   IUP at 35w2d  Peripheral edema- normotensive and " normal urine P:C, so no evidence of preeclampsia. O2 sats WNL, CXR without cardiomegaly or pulmonary edema, so peripartum cardiomyopathy not likely.     Plan   D/C home on low sodium diet. Compression hose recommended. Return for worsening symptoms, acute changes.      Leobardo Wilson MD  7/28/2023  11:33 EDT

## 2023-07-29 LAB — BACTERIA SPEC AEROBE CULT: NORMAL

## 2023-07-30 LAB — B-HEM STREP SPEC QL CULT: NEGATIVE

## 2023-07-31 ENCOUNTER — OFFICE VISIT (OUTPATIENT)
Dept: OBSTETRICS AND GYNECOLOGY | Facility: CLINIC | Age: 43
End: 2023-07-31
Payer: COMMERCIAL

## 2023-07-31 ENCOUNTER — HOSPITAL ENCOUNTER (OUTPATIENT)
Dept: ULTRASOUND IMAGING | Facility: HOSPITAL | Age: 43
Discharge: HOME | End: 2023-07-31
Payer: COMMERCIAL

## 2023-07-31 ENCOUNTER — LAB (OUTPATIENT)
Dept: LAB | Facility: HOSPITAL | Age: 43
End: 2023-07-31
Payer: COMMERCIAL

## 2023-07-31 VITALS
WEIGHT: 293 LBS | OXYGEN SATURATION: 98 % | DIASTOLIC BLOOD PRESSURE: 72 MMHG | HEART RATE: 94 BPM | SYSTOLIC BLOOD PRESSURE: 134 MMHG | TEMPERATURE: 97.5 F | BODY MASS INDEX: 53.75 KG/M2

## 2023-07-31 DIAGNOSIS — O09.523 MULTIGRAVIDA OF ADVANCED MATERNAL AGE IN THIRD TRIMESTER: ICD-10-CM

## 2023-07-31 DIAGNOSIS — O09.813 PREGNANCY RESULTING FROM IN VITRO FERTILIZATION IN THIRD TRIMESTER: Primary | ICD-10-CM

## 2023-07-31 DIAGNOSIS — O09.813 PREGNANCY RESULTING FROM IN VITRO FERTILIZATION IN THIRD TRIMESTER: ICD-10-CM

## 2023-07-31 DIAGNOSIS — O24.414 INSULIN CONTROLLED GESTATIONAL DIABETES MELLITUS (GDM) IN THIRD TRIMESTER: ICD-10-CM

## 2023-07-31 LAB
ALBUMIN SERPL-MCNC: 2.9 G/DL (ref 3.5–5.2)
ALBUMIN/GLOB SERPL: 1 G/DL
ALP SERPL-CCNC: 147 U/L (ref 39–117)
ALT SERPL W P-5'-P-CCNC: 12 U/L (ref 1–33)
ANION GAP SERPL CALCULATED.3IONS-SCNC: 9.3 MMOL/L (ref 5–15)
AST SERPL-CCNC: 15 U/L (ref 1–32)
BASOPHILS # BLD AUTO: 0.02 10*3/MM3 (ref 0–0.2)
BASOPHILS NFR BLD AUTO: 0.2 % (ref 0–1.5)
BILIRUB SERPL-MCNC: 0.3 MG/DL (ref 0–1.2)
BUN SERPL-MCNC: 8 MG/DL (ref 6–20)
BUN/CREAT SERPL: 11.9 (ref 7–25)
CALCIUM SPEC-SCNC: 9 MG/DL (ref 8.6–10.5)
CHLORIDE SERPL-SCNC: 102 MMOL/L (ref 98–107)
CO2 SERPL-SCNC: 24.7 MMOL/L (ref 22–29)
CREAT SERPL-MCNC: 0.67 MG/DL (ref 0.57–1)
CREAT UR-MCNC: 177.6 MG/DL
DEPRECATED RDW RBC AUTO: 42.4 FL (ref 37–54)
EGFRCR SERPLBLD CKD-EPI 2021: 113.5 ML/MIN/1.73
EOSINOPHIL # BLD AUTO: 0.06 10*3/MM3 (ref 0–0.4)
EOSINOPHIL NFR BLD AUTO: 0.5 % (ref 0.3–6.2)
ERYTHROCYTE [DISTWIDTH] IN BLOOD BY AUTOMATED COUNT: 13.4 % (ref 12.3–15.4)
GLOBULIN UR ELPH-MCNC: 2.8 GM/DL
GLUCOSE BLDC GLUCOMTR-MCNC: 55 MG/DL (ref 70–130)
GLUCOSE BLDC GLUCOMTR-MCNC: 67 MG/DL (ref 70–130)
GLUCOSE SERPL-MCNC: 88 MG/DL (ref 65–99)
HCT VFR BLD AUTO: 35.5 % (ref 34–46.6)
HGB BLD-MCNC: 11.6 G/DL (ref 12–15.9)
IMM GRANULOCYTES # BLD AUTO: 0.08 10*3/MM3 (ref 0–0.05)
IMM GRANULOCYTES NFR BLD AUTO: 0.6 % (ref 0–0.5)
LDH SERPL-CCNC: 139 U/L (ref 135–214)
LYMPHOCYTES # BLD AUTO: 1.46 10*3/MM3 (ref 0.7–3.1)
LYMPHOCYTES NFR BLD AUTO: 11.3 % (ref 19.6–45.3)
MCH RBC QN AUTO: 28.4 PG (ref 26.6–33)
MCHC RBC AUTO-ENTMCNC: 32.7 G/DL (ref 31.5–35.7)
MCV RBC AUTO: 86.8 FL (ref 79–97)
MONOCYTES # BLD AUTO: 0.5 10*3/MM3 (ref 0.1–0.9)
MONOCYTES NFR BLD AUTO: 3.9 % (ref 5–12)
NEUTROPHILS NFR BLD AUTO: 10.85 10*3/MM3 (ref 1.7–7)
NEUTROPHILS NFR BLD AUTO: 83.5 % (ref 42.7–76)
NRBC BLD AUTO-RTO: 0 /100 WBC (ref 0–0.2)
PLATELET # BLD AUTO: 347 10*3/MM3 (ref 140–450)
PMV BLD AUTO: 10.6 FL (ref 6–12)
POTASSIUM SERPL-SCNC: 4.4 MMOL/L (ref 3.5–5.2)
PROT ?TM UR-MCNC: 23.5 MG/DL
PROT SERPL-MCNC: 5.7 G/DL (ref 6–8.5)
PROT/CREAT UR: 132.3 MG/G CREA (ref 0–200)
RBC # BLD AUTO: 4.09 10*6/MM3 (ref 3.77–5.28)
SODIUM SERPL-SCNC: 136 MMOL/L (ref 136–145)
URATE SERPL-MCNC: 4.7 MG/DL (ref 2.4–5.7)
WBC NRBC COR # BLD: 12.97 10*3/MM3 (ref 3.4–10.8)

## 2023-07-31 PROCEDURE — 36415 COLL VENOUS BLD VENIPUNCTURE: CPT

## 2023-07-31 PROCEDURE — 76816 OB US FOLLOW-UP PER FETUS: CPT

## 2023-07-31 PROCEDURE — 82948 REAGENT STRIP/BLOOD GLUCOSE: CPT

## 2023-07-31 PROCEDURE — 80053 COMPREHEN METABOLIC PANEL: CPT

## 2023-07-31 PROCEDURE — 83615 LACTATE (LD) (LDH) ENZYME: CPT

## 2023-07-31 PROCEDURE — 85025 COMPLETE CBC W/AUTO DIFF WBC: CPT

## 2023-07-31 PROCEDURE — 76819 FETAL BIOPHYS PROFIL W/O NST: CPT

## 2023-07-31 PROCEDURE — 82570 ASSAY OF URINE CREATININE: CPT

## 2023-07-31 PROCEDURE — 84156 ASSAY OF PROTEIN URINE: CPT

## 2023-07-31 PROCEDURE — 84550 ASSAY OF BLOOD/URIC ACID: CPT

## 2023-08-01 ENCOUNTER — HOSPITAL ENCOUNTER (OUTPATIENT)
Dept: INFUSION THERAPY | Facility: HOSPITAL | Age: 43
Discharge: HOME | End: 2023-08-01
Admitting: OBSTETRICS & GYNECOLOGY
Payer: COMMERCIAL

## 2023-08-01 VITALS
DIASTOLIC BLOOD PRESSURE: 82 MMHG | SYSTOLIC BLOOD PRESSURE: 141 MMHG | TEMPERATURE: 98.4 F | HEART RATE: 87 BPM | RESPIRATION RATE: 20 BRPM | OXYGEN SATURATION: 99 %

## 2023-08-01 DIAGNOSIS — E86.0 DEHYDRATION DURING PREGNANCY: Primary | ICD-10-CM

## 2023-08-01 DIAGNOSIS — O99.280 DEHYDRATION DURING PREGNANCY: Primary | ICD-10-CM

## 2023-08-01 PROCEDURE — 96365 THER/PROPH/DIAG IV INF INIT: CPT

## 2023-08-01 PROCEDURE — 96366 THER/PROPH/DIAG IV INF ADDON: CPT

## 2023-08-01 PROCEDURE — 96361 HYDRATE IV INFUSION ADD-ON: CPT

## 2023-08-01 PROCEDURE — 96360 HYDRATION IV INFUSION INIT: CPT

## 2023-08-01 RX ORDER — SODIUM CHLORIDE, SODIUM LACTATE, POTASSIUM CHLORIDE, CALCIUM CHLORIDE 600; 310; 30; 20 MG/100ML; MG/100ML; MG/100ML; MG/100ML
500 INJECTION, SOLUTION INTRAVENOUS 2 TIMES WEEKLY
OUTPATIENT
Start: 2023-08-01

## 2023-08-01 RX ORDER — SODIUM CHLORIDE, SODIUM LACTATE, POTASSIUM CHLORIDE, CALCIUM CHLORIDE 600; 310; 30; 20 MG/100ML; MG/100ML; MG/100ML; MG/100ML
500 INJECTION, SOLUTION INTRAVENOUS 2 TIMES WEEKLY
Status: DISCONTINUED | OUTPATIENT
Start: 2023-08-01 | End: 2023-08-03 | Stop reason: HOSPADM

## 2023-08-01 RX ADMIN — SODIUM CHLORIDE, POTASSIUM CHLORIDE, SODIUM LACTATE AND CALCIUM CHLORIDE 500 ML/HR: 600; 310; 30; 20 INJECTION, SOLUTION INTRAVENOUS at 09:20

## 2023-08-02 ENCOUNTER — TELEPHONE (OUTPATIENT)
Dept: OBSTETRICS AND GYNECOLOGY | Age: 43
End: 2023-08-02

## 2023-08-02 ENCOUNTER — HOSPITAL ENCOUNTER (INPATIENT)
Facility: HOSPITAL | Age: 43
LOS: 1 days | Discharge: HOME | DRG: 833 | End: 2023-08-03
Attending: OBSTETRICS & GYNECOLOGY | Admitting: OBSTETRICS & GYNECOLOGY
Payer: COMMERCIAL

## 2023-08-02 PROBLEM — O13.9 GESTATIONAL HYPERTENSION: Status: ACTIVE | Noted: 2023-08-02

## 2023-08-02 PROBLEM — O24.419 GESTATIONAL DIABETES: Status: ACTIVE | Noted: 2023-08-02

## 2023-08-02 LAB
ABO GROUP BLD: NORMAL
ALBUMIN SERPL-MCNC: 3.1 G/DL (ref 3.5–5.2)
ALBUMIN/GLOB SERPL: 1.1 G/DL
ALP SERPL-CCNC: 147 U/L (ref 39–117)
ALT SERPL W P-5'-P-CCNC: 10 U/L (ref 1–33)
ANION GAP SERPL CALCULATED.3IONS-SCNC: 12.5 MMOL/L (ref 5–15)
AST SERPL-CCNC: 19 U/L (ref 1–32)
BILIRUB SERPL-MCNC: 0.2 MG/DL (ref 0–1.2)
BLD GP AB SCN SERPL QL: NEGATIVE
BUN SERPL-MCNC: 8 MG/DL (ref 6–20)
BUN/CREAT SERPL: 11.1 (ref 7–25)
CALCIUM SPEC-SCNC: 9 MG/DL (ref 8.6–10.5)
CHLORIDE SERPL-SCNC: 104 MMOL/L (ref 98–107)
CO2 SERPL-SCNC: 21.5 MMOL/L (ref 22–29)
CREAT SERPL-MCNC: 0.72 MG/DL (ref 0.57–1)
DEPRECATED RDW RBC AUTO: 40.5 FL (ref 37–54)
EGFRCR SERPLBLD CKD-EPI 2021: 108.6 ML/MIN/1.73
ERYTHROCYTE [DISTWIDTH] IN BLOOD BY AUTOMATED COUNT: 13.3 % (ref 12.3–15.4)
GLOBULIN UR ELPH-MCNC: 2.7 GM/DL
GLUCOSE BLDC GLUCOMTR-MCNC: 123 MG/DL (ref 70–130)
GLUCOSE BLDC GLUCOMTR-MCNC: 95 MG/DL (ref 70–130)
GLUCOSE SERPL-MCNC: 91 MG/DL (ref 65–99)
HCT VFR BLD AUTO: 34.7 % (ref 34–46.6)
HGB BLD-MCNC: 11.6 G/DL (ref 12–15.9)
MCH RBC QN AUTO: 28.2 PG (ref 26.6–33)
MCHC RBC AUTO-ENTMCNC: 33.4 G/DL (ref 31.5–35.7)
MCV RBC AUTO: 84.2 FL (ref 79–97)
PLATELET # BLD AUTO: 346 10*3/MM3 (ref 140–450)
PMV BLD AUTO: 10.2 FL (ref 6–12)
POTASSIUM SERPL-SCNC: 4.1 MMOL/L (ref 3.5–5.2)
PROT SERPL-MCNC: 5.8 G/DL (ref 6–8.5)
RBC # BLD AUTO: 4.12 10*6/MM3 (ref 3.77–5.28)
RH BLD: POSITIVE
SODIUM SERPL-SCNC: 138 MMOL/L (ref 136–145)
T&S EXPIRATION DATE: NORMAL
WBC NRBC COR # BLD: 12.09 10*3/MM3 (ref 3.4–10.8)

## 2023-08-02 PROCEDURE — 63710000001 INSULIN ISOPHANE HUMAN PER 5 UNITS: Performed by: OBSTETRICS & GYNECOLOGY

## 2023-08-02 PROCEDURE — 86901 BLOOD TYPING SEROLOGIC RH(D): CPT | Performed by: OBSTETRICS & GYNECOLOGY

## 2023-08-02 PROCEDURE — 86900 BLOOD TYPING SEROLOGIC ABO: CPT | Performed by: OBSTETRICS & GYNECOLOGY

## 2023-08-02 PROCEDURE — 85027 COMPLETE CBC AUTOMATED: CPT | Performed by: OBSTETRICS & GYNECOLOGY

## 2023-08-02 PROCEDURE — G0463 HOSPITAL OUTPT CLINIC VISIT: HCPCS

## 2023-08-02 PROCEDURE — 59025 FETAL NON-STRESS TEST: CPT

## 2023-08-02 PROCEDURE — 80053 COMPREHEN METABOLIC PANEL: CPT | Performed by: OBSTETRICS & GYNECOLOGY

## 2023-08-02 PROCEDURE — 86850 RBC ANTIBODY SCREEN: CPT | Performed by: OBSTETRICS & GYNECOLOGY

## 2023-08-02 PROCEDURE — 63710000001 PROMETHAZINE PER 25 MG: Performed by: OBSTETRICS & GYNECOLOGY

## 2023-08-02 PROCEDURE — 82948 REAGENT STRIP/BLOOD GLUCOSE: CPT

## 2023-08-02 PROCEDURE — 99221 1ST HOSP IP/OBS SF/LOW 40: CPT | Performed by: OBSTETRICS & GYNECOLOGY

## 2023-08-02 PROCEDURE — 63710000001 INSULIN LISPRO (HUMAN) PER 5 UNITS: Performed by: OBSTETRICS & GYNECOLOGY

## 2023-08-02 PROCEDURE — 59025 FETAL NON-STRESS TEST: CPT | Performed by: OBSTETRICS & GYNECOLOGY

## 2023-08-02 RX ORDER — METFORMIN HYDROCHLORIDE 500 MG/1
500 TABLET, EXTENDED RELEASE ORAL
Status: DISCONTINUED | OUTPATIENT
Start: 2023-08-03 | End: 2023-08-02

## 2023-08-02 RX ORDER — LEVOTHYROXINE SODIUM 0.07 MG/1
75 TABLET ORAL DAILY
Status: DISCONTINUED | OUTPATIENT
Start: 2023-08-03 | End: 2023-08-02

## 2023-08-02 RX ORDER — PRENATAL VIT/IRON FUM/FOLIC AC 27MG-0.8MG
1 TABLET ORAL DAILY
Status: DISCONTINUED | OUTPATIENT
Start: 2023-08-02 | End: 2023-08-03 | Stop reason: HOSPADM

## 2023-08-02 RX ORDER — IBUPROFEN 600 MG/1
1 TABLET ORAL ONCE AS NEEDED
Status: DISCONTINUED | OUTPATIENT
Start: 2023-08-02 | End: 2023-08-03 | Stop reason: HOSPADM

## 2023-08-02 RX ORDER — ACETAMINOPHEN 325 MG/1
650 TABLET ORAL ONCE AS NEEDED
Status: COMPLETED | OUTPATIENT
Start: 2023-08-02 | End: 2023-08-02

## 2023-08-02 RX ORDER — TRIAMCINOLONE ACETONIDE 55 UG/1
2 SPRAY, METERED NASAL NIGHTLY
Status: DISCONTINUED | OUTPATIENT
Start: 2023-08-02 | End: 2023-08-03 | Stop reason: HOSPADM

## 2023-08-02 RX ORDER — BUSPIRONE HYDROCHLORIDE 15 MG/1
7.5 TABLET ORAL 2 TIMES DAILY
Status: DISCONTINUED | OUTPATIENT
Start: 2023-08-02 | End: 2023-08-03 | Stop reason: HOSPADM

## 2023-08-02 RX ORDER — INSULIN LISPRO 100 [IU]/ML
17 INJECTION, SOLUTION INTRAVENOUS; SUBCUTANEOUS
Status: DISCONTINUED | OUTPATIENT
Start: 2023-08-02 | End: 2023-08-03 | Stop reason: HOSPADM

## 2023-08-02 RX ORDER — CITALOPRAM HYDROBROMIDE 10 MG/1
10 TABLET ORAL DAILY
Status: DISCONTINUED | OUTPATIENT
Start: 2023-08-02 | End: 2023-08-03 | Stop reason: HOSPADM

## 2023-08-02 RX ORDER — LEVOTHYROXINE SODIUM 0.07 MG/1
75 TABLET ORAL
Status: DISCONTINUED | OUTPATIENT
Start: 2023-08-03 | End: 2023-08-03 | Stop reason: HOSPADM

## 2023-08-02 RX ORDER — PANTOPRAZOLE SODIUM 40 MG/1
40 TABLET, DELAYED RELEASE ORAL DAILY
Status: DISCONTINUED | OUTPATIENT
Start: 2023-08-03 | End: 2023-08-03 | Stop reason: HOSPADM

## 2023-08-02 RX ORDER — PROMETHAZINE HYDROCHLORIDE 25 MG/1
25 TABLET ORAL EVERY 6 HOURS PRN
Status: DISCONTINUED | OUTPATIENT
Start: 2023-08-02 | End: 2023-08-03 | Stop reason: HOSPADM

## 2023-08-02 RX ORDER — CETIRIZINE HYDROCHLORIDE 10 MG/1
10 TABLET ORAL NIGHTLY
Status: DISCONTINUED | OUTPATIENT
Start: 2023-08-02 | End: 2023-08-03 | Stop reason: HOSPADM

## 2023-08-02 RX ORDER — INSULIN LISPRO 100 [IU]/ML
22 INJECTION, SOLUTION INTRAVENOUS; SUBCUTANEOUS
Status: DISCONTINUED | OUTPATIENT
Start: 2023-08-03 | End: 2023-08-03 | Stop reason: HOSPADM

## 2023-08-02 RX ORDER — LEVOTHYROXINE SODIUM 0.07 MG/1
75 TABLET ORAL
Status: DISCONTINUED | OUTPATIENT
Start: 2023-08-02 | End: 2023-08-02

## 2023-08-02 RX ORDER — METFORMIN HYDROCHLORIDE 500 MG/1
500 TABLET, EXTENDED RELEASE ORAL 2 TIMES DAILY WITH MEALS
Status: DISCONTINUED | OUTPATIENT
Start: 2023-08-02 | End: 2023-08-03 | Stop reason: HOSPADM

## 2023-08-02 RX ORDER — INSULIN LISPRO 100 [IU]/ML
18 INJECTION, SOLUTION INTRAVENOUS; SUBCUTANEOUS
Status: DISCONTINUED | OUTPATIENT
Start: 2023-08-02 | End: 2023-08-03 | Stop reason: HOSPADM

## 2023-08-02 RX ORDER — ONDANSETRON 4 MG/1
4 TABLET, ORALLY DISINTEGRATING ORAL EVERY 12 HOURS PRN
Status: DISCONTINUED | OUTPATIENT
Start: 2023-08-02 | End: 2023-08-03

## 2023-08-02 RX ORDER — FERROUS SULFATE 325(65) MG
325 TABLET ORAL
Status: DISCONTINUED | OUTPATIENT
Start: 2023-08-02 | End: 2023-08-03 | Stop reason: HOSPADM

## 2023-08-02 RX ORDER — SODIUM CHLORIDE 0.9 % (FLUSH) 0.9 %
10 SYRINGE (ML) INJECTION EVERY 12 HOURS SCHEDULED
Status: DISCONTINUED | OUTPATIENT
Start: 2023-08-02 | End: 2023-08-03 | Stop reason: HOSPADM

## 2023-08-02 RX ADMIN — SODIUM CHLORIDE, PRESERVATIVE FREE 10 ML: 5 INJECTION INTRAVENOUS at 21:55

## 2023-08-02 RX ADMIN — CETIRIZINE HYDROCHLORIDE 10 MG: 10 TABLET ORAL at 22:49

## 2023-08-02 RX ADMIN — PROMETHAZINE HYDROCHLORIDE 25 MG: 25 TABLET ORAL at 23:42

## 2023-08-02 RX ADMIN — TRIAMCINOLONE ACETONIDE 2 SPRAY: 55 SPRAY, METERED NASAL at 21:32

## 2023-08-02 RX ADMIN — BUSPIRONE HYDROCHLORIDE 7.5 MG: 15 TABLET ORAL at 21:30

## 2023-08-02 RX ADMIN — ACETAMINOPHEN 650 MG: 325 TABLET, FILM COATED ORAL at 22:49

## 2023-08-02 RX ADMIN — CITALOPRAM 10 MG: 10 TABLET, FILM COATED ORAL at 21:30

## 2023-08-02 RX ADMIN — FERROUS SULFATE TAB 325 MG (65 MG ELEMENTAL FE) 325 MG: 325 (65 FE) TAB at 18:19

## 2023-08-02 RX ADMIN — METFORMIN HYDROCHLORIDE 500 MG: 500 TABLET, EXTENDED RELEASE ORAL at 19:51

## 2023-08-02 RX ADMIN — Medication 1 TABLET: at 21:30

## 2023-08-02 RX ADMIN — INSULIN HUMAN 42 UNITS: 100 INJECTION, SUSPENSION SUBCUTANEOUS at 22:36

## 2023-08-02 RX ADMIN — MAGNESIUM OXIDE 400 MG (241.3 MG MAGNESIUM) TABLET 400 MG: TABLET at 19:51

## 2023-08-02 RX ADMIN — INSULIN LISPRO 18 UNITS: 100 INJECTION, SOLUTION INTRAVENOUS; SUBCUTANEOUS at 18:19

## 2023-08-02 NOTE — H&P
[facility]  Obstetric History and Physical    Chief Complaint   Patient presents with    Elevated Blood Pressure     Denies CTX, LOF, and vaginal bleeding. Reports +FM. Direct admit to antepartum, serial Bps at home that were elevated, denies headache and visual changes currently. Reports increased swelling of extremities.        Subjective     Patient is a 42 y.o. female  currently at 36w0d, who presents with reports of elevated BP this AM. She notes BP's ranged from 141-145/. She contacted her OB MD and was advised to come to the hospital for admission. She denies headache, vision changes or abdominal pain. She denies chest pain or shortness of air. She does complains of lower ext swelling.    Her prenatal care is complicated by .  Patient Active Problem List   Diagnosis    Morbid obesity with BMI of 50.0-59.9, adult    Male factor infertility    Cystic fibrosis carrier    Pregnancy    History of sleeve gastrectomy    AMA (advanced maternal age) multigravida 35+    Pregnancy conceived through in vitro fertilization    Elevated hemoglobin A1c    Subclinical hypothyroidism    High-risk pregnancy in second trimester    Dehydration during pregnancy    Pregnancy resulting from in vitro fertilization in second trimester    Gestational diabetes mellitus (GDM) in childbirth, insulin controlled    Gestational diabetes    Gestational hypertension     Her previous obstetric/gynecological history is note contributory.     The following portions of the patients history were reviewed and updated as appropriate: current medications, allergies, past medical history, past surgical history, past family history, past social history, and problem list .       Prenatal Information:  Prenatal Results       Initial Prenatal Labs       Test Value Reference Range Date Time    Hemoglobin  12.2 g/dL 12.0 - 15.9 23 0940       12.6 g/dL 11.1 - 15.9 23 1026      ^ 12.9 g/dL 12.0 - 16.0 23 1220    Hematocrit   36.4 % 34.0 - 46.6 04/11/23 0940       39.0 % 34.0 - 46.6 02/09/23 1026      ^ 39.1 % 36.0 - 46.0 01/23/23 1220    Platelets  301 10*3/mm3 140 - 450 04/11/23 0940       352 x10E3/uL 150 - 450 02/09/23 1026      ^ 371 10*3/uL 140 - 440 01/23/23 1220    Rubella IgG  2.18 index Immune >0.99 02/09/23 1026    Hepatitis B SAg  Negative  Negative 02/09/23 1026    Hepatitis C Ab  <0.1 s/co ratio 0.0 - 0.9 02/09/23 1026    RPR  Non Reactive  Non Reactive 02/09/23 1026    T. Pallidum Ab         ABO  O   08/02/23 1720    Rh  Positive   08/02/23 1720    Antibody Screen  Negative  Negative 02/09/23 1026    HIV  Non Reactive  Non Reactive 02/09/23 1026    Urine Culture  25,000 CFU/mL Normal Urogenital Alyx   07/28/23 1109       Final report   06/20/23 1027       >100,000 CFU/mL Mixed Alyx Isolated   05/21/23 2331       Final report   02/09/23 1143    Gonorrhea  Negative  Negative 02/09/23 1143    Chlamydia  Negative  Negative 02/09/23 1143    TSH  0.760 uIU/mL 0.270 - 4.200 04/11/23 0940       0.906 uIU/mL 0.450 - 4.500 02/09/23 1026    HgB A1c   5.7 % 4.8 - 5.6 02/09/23 1026    Varicella IgG  730 index Immune >165 02/09/23 1026    HgB Electrophoresis         Cystic fibrosis                   Fetal testing        Test Value Reference Range Date Time    NIPT        MSAFP  *Screen Negative*   04/11/23 0940    AFP-4                  2nd and 3rd Trimester       Test Value Reference Range Date Time    Hemoglobin (repeated)  11.6 g/dL 12.0 - 15.9 08/02/23 1720       11.6 g/dL 12.0 - 15.9 07/31/23 1508       11.1 g/dL 12.0 - 15.9 05/21/23 2330    Hematocrit (repeated)  34.7 % 34.0 - 46.6 08/02/23 1720       35.5 % 34.0 - 46.6 07/31/23 1508       33.1 % 34.0 - 46.6 05/21/23 2330    Platelets   346 10*3/mm3 140 - 450 08/02/23 1720       347 10*3/mm3 140 - 450 07/31/23 1508       369 10*3/mm3 140 - 450 05/21/23 2330       301 10*3/mm3 140 - 450 04/11/23 0940       352 x10E3/uL 150 - 450 02/09/23 1026      ^ 371 10*3/uL 140 - 440 01/23/23  1220    GCT  192 mg/dL 65 - 139 04/11/23 0940    Antibody Screen (repeated)  Negative   08/02/23 1720       Negative   05/21/23 2330       Negative  Negative 02/09/23 1026    GTT Fasting        GTT 1 Hr        GTT 2 Hr        GTT 3 Hr        Group B Strep  Negative  Negative 07/25/23 1240              Other testing        Test Value Reference Range Date Time    Parvo IgG         CMV IgG                   Drug Screening       Test Value Reference Range Date Time    Amphetamine Screen        Barbiturate Screen        Benzodiazepine Screen        Methadone Screen        Phencyclidine Screen        Opiates Screen        THC Screen        Cocaine Screen        Propoxyphene Screen        Buprenorphine Screen        Methamphetamine Screen        Oxycodone Screen        Tricyclic Antidepressants Screen                  Legend    ^: Historical                          External Prenatal Results       Pregnancy Outside Results - Transcribed From Office Records - See Scanned Records For Details       Test Value Date Time    ABO  O  08/02/23 1720    Rh  Positive  08/02/23 1720    Antibody Screen  Negative  08/02/23 1720       Negative  05/21/23 2330       Negative  02/09/23 1026    Varicella IgG  730 index 02/09/23 1026    Rubella  2.18 index 02/09/23 1026    Hgb  11.6 g/dL 08/02/23 1720       11.6 g/dL 07/31/23 1508       11.1 g/dL 05/21/23 2330       12.2 g/dL 04/11/23 0940       12.6 g/dL 02/09/23 1026      ^ 12.9 g/dL 01/23/23 1220    Hct  34.7 % 08/02/23 1720       35.5 % 07/31/23 1508       33.1 % 05/21/23 2330       36.4 % 04/11/23 0940       39.0 % 02/09/23 1026      ^ 39.1 % 01/23/23 1220    Glucose Fasting GTT       Glucose Tolerance Test 1 hour       Glucose Tolerance Test 3 hour       Gonorrhea (discrete)  Negative  02/09/23 1143    Chlamydia (discrete)  Negative  02/09/23 1143    RPR  Non Reactive  02/09/23 1026    VDRL       Syphilis Antibody       HBsAg  Negative  02/09/23 1026    Herpes Simplex Virus PCR        Herpes Simplex VIrus Culture       HIV  Non Reactive  23 1026    Hep C RNA Quant PCR       Hep C Antibody  <0.1 s/co ratio 23 1026    AFP  61.2 ng/mL 23 0940    Group B Strep  Negative  23 1240    GBS Susceptibility to Clindamycin       GBS Susceptibility to Erythromycin       Fetal Fibronectin       Genetic Testing, Maternal Blood                 Drug Screening       Test Value Date Time    Urine Drug Screen       Amphetamine Screen       Barbiturate Screen       Benzodiazepine Screen       Methadone Screen       Phencyclidine Screen       Opiates Screen       THC Screen       Cocaine Screen       Propoxyphene Screen       Buprenorphine Screen       Methamphetamine Screen       Oxycodone Screen       Tricyclic Antidepressants Screen                 Legend    ^: Historical                             Past OB History:     OB History    Para Term  AB Living   1 0 0 0 0 0   SAB IAB Ectopic Molar Multiple Live Births   0 0 0 0 0 0      # Outcome Date GA Lbr Santo/2nd Weight Sex Delivery Anes PTL Lv   1 Current                Past Medical History: Past Medical History:   Diagnosis Date    Arthritis     Asthma     Seasonal, haven't used inhaler for 2 years.    Cystic fibrosis carrier     GERD (gastroesophageal reflux disease)     Gestational diabetes     Nausea & vomiting       Past Surgical History Past Surgical History:   Procedure Laterality Date    COLONOSCOPY      GI issues while in high school.     GASTRIC SLEEVE LAPAROSCOPIC  2020    shekhar will    TONSILLECTOMY      TRUNK SKIN LESION EXCISIONAL BIOPSY      benign lesion    TYMPANOSTOMY TUBE PLACEMENT      WISDOM TOOTH EXTRACTION        Family History: Family History   Problem Relation Age of Onset    Hypertension Father     Anxiety disorder Father     Hypertension Mother     Anxiety disorder Mother     Depression Mother     Heart disease Paternal Grandfather     Kidney failure Maternal Grandmother         dialysis     Breast cancer Neg Hx     Ovarian cancer Neg Hx     Uterine cancer Neg Hx     Colon cancer Neg Hx     Melanoma Neg Hx     Prostate cancer Neg Hx     Pancreatic cancer Neg Hx     Congenital heart disease Neg Hx     Cystic fibrosis Neg Hx       Social History:  reports that she has never smoked. She has never been exposed to tobacco smoke. She has never used smokeless tobacco.   reports that she does not currently use alcohol.   reports no history of drug use.        General ROS:  .Review of Systems - General ROS: negative for - chills or fever  Ophthalmic ROS: positive for - occ blurred vision  negative for - spots in vision  Respiratory ROS: no cough, shortness of breath, or wheezing  Cardiovascular ROS: negative for - chest pain  Gastrointestinal ROS: negative for - abdominal pain or nausea/vomiting  Genito-Urinary ROS: negative for - vag bleeding, leaking fluid or painful ctx  Neurological ROS: negative for - headaches     Objective       Vital Signs Range for the last 24 hours  Temperature: Temp:  [97.8 øF (36.6 øC)] 97.8 øF (36.6 øC)   Temp Source: Temp src: Oral   BP: BP: (141)/(75) 141/75   Pulse: Heart Rate:  [90-98] 90   Respirations: Resp:  [18] 18   SPO2: SpO2:  [97 %] 97 %   O2 Amount (l/min):     O2 Devices     Weight: Weight:  [151 kg (333 lb)] 151 kg (333 lb)     Physical Examination: General appearance - alert, well appearing, and in no distress  Mental status - alert, oriented to person, place, and time  Chest - clear to auscultation, no wheezes, rales or rhonchi, symmetric air entry  Heart - normal rate and regular rhythm  Abdomen - soft, nontender, gravid  Neurological - alert, oriented, normal speech, no focal findings or movement disorder noted  Extremities - bilateral lower ext with 1+ edema; no cords or tenderness; 2+ DTR's  Skin - normal coloration and turgor    Presentation: def   Cervix:      Dilation:     Effacement:     Station:         Fetal Heart Rate Assessment   Method: Fetal HR  Assessment Method: external   Beats/min: Fetal HR (beats/min): 130   Baseline: Fetal HR Baseline: normal range   Variability: Fetal HR Variability: moderate (amplitude range 6 to 25 bpm)   Accels: Fetal HR Accelerations: greater than/equal to 15 bpm, lasting at least 15 seconds   Decels: Fetal HR Decelerations: absent   Tracing Category:       Uterine Assessment   Method: Method: external tocotransducer   Frequency (min): Contraction Frequency (Minutes): 4-8 (denies feeling contractions)   Ctx Count in 10 min:     Duration:     Intensity: Contraction Intensity: no contractions   Intensity by IUPC:     Resting Tone: Uterine Resting Tone: soft by palpation   Resting Tone by IUPC:     Tracy Units:       Laboratory Results:   Lab Results   Component Value Date    WBC 12.09 (H) 08/02/2023    HGB 11.6 (L) 08/02/2023    HCT 34.7 08/02/2023    MCV 84.2 08/02/2023     08/02/2023     Lab Results   Component Value Date    GLUCOSE 91 08/02/2023    BUN 8 08/02/2023    CREATININE 0.72 08/02/2023    EGFRRESULT 86 03/22/2022    EGFR 108.6 08/02/2023    BCR 11.1 08/02/2023    K 4.1 08/02/2023    CO2 21.5 (L) 08/02/2023    CALCIUM 9.0 08/02/2023    PROTENTOTREF 7.1 03/22/2022    ALBUMIN 3.1 (L) 08/02/2023    BILITOT 0.2 08/02/2023    AST 19 08/02/2023    ALT 10 08/02/2023       Assessment & Plan       Gestational diabetes    Morbid obesity with BMI of 50.0-59.9, adult    Cystic fibrosis carrier    AMA (advanced maternal age) multigravida 35+    Subclinical hypothyroidism    Pregnancy resulting from in vitro fertilization in second trimester    Gestational hypertension        Assessment:  1.  Intrauterine pregnancy at 36w0d gestation with reactive fetal status.    2.  Gestational HTN: pt will elevated BP's at home. BP on arrival is mildly increased. She has some occ blurred vision and swelling. She denies headache.     GDM: will continue current insulin and metformin regimen and monitor glucose. Recent growth u/s on 7/31  at 84 % with AC > 99 %. She is followed by MFM.    3.  Obstetrical history significant is not contributory  4.  GBS status:   Strep Gp B Culture   Date Value Ref Range Status   2023 Negative Negative Final     Comment:     Centers for Disease Control and Prevention (CDC) and American Congress  of Obstetricians and Gynecologists (ACOG) guidelines for prevention of   group B streptococcal (GBS) disease specify co-collection of  a vaginal and rectal swab specimen to maximize sensitivity of GBS  detection. Per the CDC and ACOG, swabbing both the lower vagina and  rectum substantially increases the yield of detection compared with  sampling the vagina alone.  Penicillin G, ampicillin, or cefazolin are indicated for intrapartum  prophylaxis of  GBS colonization. Reflex susceptibility  testing should be performed prior to use of clindamycin only on GBS  isolates from penicillin-allergic women who are considered a high risk  for anaphylaxis. Treatment with vancomycin without additional testing  is warranted if resistance to clindamycin is noted.         Plan:  1. Admit, serial BP's, 24 hour urine protein, glucose monitoring and NST twice daily  2. Plan of care has been reviewed with patient and spouse  3.  Risks, benefits of treatment plan have been discussed.  4.  All questions have been answered.      Miladys Freitas MD  2023  18:36 EDT

## 2023-08-02 NOTE — TELEPHONE ENCOUNTER
Called and advised pt to go to the hospital for antepartum admission, further BP monitoring and labs. Notified L&D, have call out to Dr Freitas on call    Ijeoma Briceno MD  8/2/2023  12:54 EDT

## 2023-08-02 NOTE — TELEPHONE ENCOUNTER
Provider: DR HASSAN  Caller: SANJEEV FRANCHESKA  Phone Number: [phone]  Reason for Call: PATIENT WAS JUST FOLLOWING UP WITH PROVIDER//PATIENT SEEN DR BOOKER GREEN YESTERDAY AND SHE REQUESTED THAT SHE START CHECKING HER BP BECAUSE SHE IS ON THE BORDERLINE OF PREECLAMPSIA//PATIENT STARTED TAKING BP THIS MORNING FOR SHE EVEN GOT OUT OF BED AT 7:30 AND IT /95- PATIENT GOT READY FOR WORK ANS SAT DOWN TO REST AND AFTER RESTING SHE CHECKED AGAIN AROUND 9:00 AND IT /100 AND SHE JUST CHECKED IT AGAIN AFTER BEING AT WORK ABOUT 2 HRS AND ITS /104//PATIENT IS JUST CONCERNED BECAUSE THAT IS HIGH FOR HER NORMAL READING AND SINCE ITS INCREASING THROUGHOUT THE DAY//PATIENT STATED THAT SHE IS STILL SWELLING BUT DOESN'T HAVE A HEADACHE//PLEASE FOLLOW UP

## 2023-08-02 NOTE — NON STRESS TEST
Khadra Khan, a  at 36w0d with an MEHRDAD of 2023, Date entered prior to episode creation, was seen at [facility] ANTEPARTUM UNIT for a nonstress test.    Chief Complaint   Patient presents with    Elevated Blood Pressure     Denies CTX, LOF, and vaginal bleeding. Reports +FM. Direct admit to antepartum, serial Bps at home that were elevated, denies headache and visual changes currently. Reports increased swelling of extremities.        Patient Active Problem List   Diagnosis    Morbid obesity with BMI of 50.0-59.9, adult    Male factor infertility    Cystic fibrosis carrier    Pregnancy    History of sleeve gastrectomy    AMA (advanced maternal age) multigravida 35+    Pregnancy conceived through in vitro fertilization    Elevated hemoglobin A1c    Subclinical hypothyroidism    High-risk pregnancy in second trimester    Dehydration during pregnancy    Pregnancy resulting from in vitro fertilization in second trimester    Gestational diabetes mellitus (GDM) in childbirth, insulin controlled    Gestational diabetes    Gestational hypertension       Start Time: 1532  Stop Time: 1631    Interpretation A  Nonstress Test Interpretation A: Reactive

## 2023-08-02 NOTE — PLAN OF CARE
Problem: Adult Inpatient Plan of Care  Goal: Plan of Care Review  Outcome: Ongoing, Progressing  Flowsheets (Taken 8/2/2023 1907)  Progress: no change  Plan of Care Reviewed With: patient  Outcome Evaluation: Pt was a direct admit due to elevated BPs, started 24 hour urine, reactive NST, denies CTX/LOF/VB, pt reports +FM. Stable vitals at this time will continue to monitor.  Goal: Patient-Specific Goal (Individualized)  Outcome: Ongoing, Progressing  Flowsheets (Taken 8/2/2023 1907)  Patient-Specific Goals (Include Timeframe): Finish 24 hour urine, continue to monitor vitals, and healthy mom and baby at delivery.  Individualized Care Needs: education and diabetes management.  Anxieties, Fears or Concerns: anxieties associated with first baby and elevated BPs.  Goal: Absence of Hospital-Acquired Illness or Injury  Outcome: Ongoing, Progressing  Intervention: Identify and Manage Fall Risk  Recent Flowsheet Documentation  Taken 8/2/2023 1545 by Vic Phelps, RN  Safety Promotion/Fall Prevention: safety round/check completed  Goal: Optimal Comfort and Wellbeing  Outcome: Ongoing, Progressing  Goal: Readiness for Transition of Care  Outcome: Ongoing, Progressing  Intervention: Mutually Develop Transition Plan  Recent Flowsheet Documentation  Taken 8/2/2023 1605 by Vic Phelps, RN  Equipment Currently Used at Home: none  Taken 8/2/2023 1556 by Vic Phelps, RN  Equipment Currently Used at Home: none  Transportation Anticipated: car, drives self  Readmission Within the Last 30 Days: planned readmission  Patient/Family Anticipated Services at Transition: none  Patient/Family Anticipates Transition to: home     Problem: Maternal-Fetal Wellbeing  Goal: Optimal Maternal-Fetal Wellbeing  Outcome: Ongoing, Progressing   Goal Outcome Evaluation:  Plan of Care Reviewed With: patient        Progress: no change  Outcome Evaluation: Pt was a direct admit due to elevated BPs, started 24 hour urine, reactive NST, denies  CTX/LOF/VB, pt reports +FM. Stable vitals at this time will continue to monitor.

## 2023-08-03 VITALS
DIASTOLIC BLOOD PRESSURE: 81 MMHG | TEMPERATURE: 98.4 F | HEART RATE: 93 BPM | BODY MASS INDEX: 47.09 KG/M2 | RESPIRATION RATE: 18 BRPM | HEIGHT: 66 IN | SYSTOLIC BLOOD PRESSURE: 140 MMHG | OXYGEN SATURATION: 97 % | WEIGHT: 293 LBS

## 2023-08-03 PROBLEM — O13.3 GESTATIONAL HYPERTENSION, THIRD TRIMESTER: Status: ACTIVE | Noted: 2023-08-03

## 2023-08-03 LAB
COLLECT DURATION TIME UR: 24 HRS
GLUCOSE BLDC GLUCOMTR-MCNC: 102 MG/DL (ref 70–130)
GLUCOSE BLDC GLUCOMTR-MCNC: 67 MG/DL (ref 70–130)
GLUCOSE BLDC GLUCOMTR-MCNC: 71 MG/DL (ref 70–130)
GLUCOSE BLDC GLUCOMTR-MCNC: 73 MG/DL (ref 70–130)
GLUCOSE BLDC GLUCOMTR-MCNC: 74 MG/DL (ref 70–130)
GLUCOSE BLDC GLUCOMTR-MCNC: 84 MG/DL (ref 70–130)
GLUCOSE BLDC GLUCOMTR-MCNC: 92 MG/DL (ref 70–130)
PROT 24H UR-MRATE: 185.7 MG/24HOURS (ref 0–150)
SPECIMEN VOL 24H UR: 2350 ML

## 2023-08-03 PROCEDURE — 63710000001 INSULIN ISOPHANE HUMAN PER 5 UNITS: Performed by: OBSTETRICS & GYNECOLOGY

## 2023-08-03 PROCEDURE — 59025 FETAL NON-STRESS TEST: CPT

## 2023-08-03 PROCEDURE — 63710000001 ONDANSETRON ODT 4 MG TABLET DISPERSIBLE: Performed by: OBSTETRICS & GYNECOLOGY

## 2023-08-03 PROCEDURE — 81050 URINALYSIS VOLUME MEASURE: CPT | Performed by: OBSTETRICS & GYNECOLOGY

## 2023-08-03 PROCEDURE — 63710000001 INSULIN LISPRO (HUMAN) PER 5 UNITS: Performed by: OBSTETRICS & GYNECOLOGY

## 2023-08-03 PROCEDURE — 82948 REAGENT STRIP/BLOOD GLUCOSE: CPT

## 2023-08-03 PROCEDURE — 84156 ASSAY OF PROTEIN URINE: CPT | Performed by: OBSTETRICS & GYNECOLOGY

## 2023-08-03 RX ORDER — ONDANSETRON 4 MG/1
4 TABLET, ORALLY DISINTEGRATING ORAL EVERY 8 HOURS PRN
Status: DISCONTINUED | OUTPATIENT
Start: 2023-08-03 | End: 2023-08-03 | Stop reason: HOSPADM

## 2023-08-03 RX ORDER — ACETAMINOPHEN 500 MG
1000 TABLET ORAL ONCE
Status: COMPLETED | OUTPATIENT
Start: 2023-08-03 | End: 2023-08-03

## 2023-08-03 RX ADMIN — METFORMIN HYDROCHLORIDE 500 MG: 500 TABLET, EXTENDED RELEASE ORAL at 08:02

## 2023-08-03 RX ADMIN — METFORMIN HYDROCHLORIDE 500 MG: 500 TABLET, EXTENDED RELEASE ORAL at 18:47

## 2023-08-03 RX ADMIN — LEVOTHYROXINE SODIUM 75 MCG: 0.07 TABLET ORAL at 06:33

## 2023-08-03 RX ADMIN — ONDANSETRON 4 MG: 4 TABLET, ORALLY DISINTEGRATING ORAL at 02:11

## 2023-08-03 RX ADMIN — INSULIN HUMAN 40 UNITS: 100 INJECTION, SUSPENSION SUBCUTANEOUS at 08:04

## 2023-08-03 RX ADMIN — FERROUS SULFATE TAB 325 MG (65 MG ELEMENTAL FE) 325 MG: 325 (65 FE) TAB at 18:47

## 2023-08-03 RX ADMIN — SODIUM CHLORIDE, PRESERVATIVE FREE 10 ML: 5 INJECTION INTRAVENOUS at 09:00

## 2023-08-03 RX ADMIN — INSULIN LISPRO 17 UNITS: 100 INJECTION, SOLUTION INTRAVENOUS; SUBCUTANEOUS at 11:40

## 2023-08-03 RX ADMIN — PANTOPRAZOLE SODIUM 40 MG: 40 TABLET, DELAYED RELEASE ORAL at 08:02

## 2023-08-03 RX ADMIN — INSULIN LISPRO 18 UNITS: 100 INJECTION, SOLUTION INTRAVENOUS; SUBCUTANEOUS at 17:09

## 2023-08-03 RX ADMIN — ACETAMINOPHEN 1000 MG: 500 TABLET ORAL at 15:02

## 2023-08-03 RX ADMIN — FERROUS SULFATE TAB 325 MG (65 MG ELEMENTAL FE) 325 MG: 325 (65 FE) TAB at 08:02

## 2023-08-03 RX ADMIN — BUSPIRONE HYDROCHLORIDE 7.5 MG: 15 TABLET ORAL at 08:02

## 2023-08-03 RX ADMIN — MAGNESIUM OXIDE 400 MG (241.3 MG MAGNESIUM) TABLET 400 MG: TABLET at 18:47

## 2023-08-03 RX ADMIN — INSULIN LISPRO 22 UNITS: 100 INJECTION, SOLUTION INTRAVENOUS; SUBCUTANEOUS at 08:03

## 2023-08-03 NOTE — DISCHARGE SUMMARY
[facility]   Obstetrics and Gynecology   Antepartum Note      8/3/2023    Name:Khadra Khan    MR#:[id number]    Progress note                        HD:1    Subjective     Chief Complaint   Patient presents with    Elevated Blood Pressure     Denies CTX, LOF, and vaginal bleeding. Reports +FM. Direct admit to antepartum, serial Bps at home that were elevated, denies headache and visual changes currently. Reports increased swelling of extremities.        42 y.o. yo Female  at 36w1d admitted with gestational HTN. She was dx in the [facility] office Monday and as an outpatient had elevated blood pressures and so was instructed to come for monitoring. Today she had a very mild headache that resolved with tylenol. She otherwise feels well. Had had swelling which is stable.    Patient Active Problem List   Diagnosis    Morbid obesity with BMI of 50.0-59.9, adult    Male factor infertility    Cystic fibrosis carrier    Pregnancy    History of sleeve gastrectomy    AMA (advanced maternal age) multigravida 35+    Pregnancy conceived through in vitro fertilization    Elevated hemoglobin A1c    Subclinical hypothyroidism    High-risk pregnancy in second trimester    Dehydration during pregnancy    Pregnancy resulting from in vitro fertilization in second trimester    Gestational diabetes mellitus (GDM) in childbirth, insulin controlled    Gestational diabetes    Gestational hypertension    Gestational hypertension, third trimester            Objective    Vitals  Temp:  Temp:  [97.6 øF (36.4 øC)-98.6 øF (37 øC)] 98.4 øF (36.9 øC)  Temp src: Oral  BP:  BP: (111-150)/(65-91) 140/81  Pulse:  Heart Rate:  [81-94] 93  RR:   Resp:  [16-18] 18    Wt Readings from Last 3 Encounters:   23 (!) 151 kg (333 lb)   23 (!) 151 kg (333 lb)   23 (!) 148 kg (327 lb)       Body mass index is 53.75 kg/mý.    Exam:    Physical Exam  Well, no distress  Regular, nonlabored breathing  Fundus soft, nontender. No RUQ  TTP  1+ pitting edema BL tops of feet  Normal patellar reflex    No intake/output data recorded.    Results from last 7 days   Lab Units 08/02/23  1720 07/31/23  1508   WBC 10*3/mm3 12.09* 12.97*   HEMOGLOBIN g/dL 11.6* 11.6*   HEMATOCRIT % 34.7 35.5   PLATELETS 10*3/mm3 346 347     Results from last 7 days   Lab Units 08/02/23  1720 07/31/23  1508   SODIUM mmol/L 138 136   POTASSIUM mmol/L 4.1 4.4   CHLORIDE mmol/L 104 102   CO2 mmol/L 21.5* 24.7   BUN mg/dL 8 8   CREATININE mg/dL 0.72 0.67   CALCIUM mg/dL 9.0 9.0   BILIRUBIN mg/dL 0.2 0.3   ALK PHOS U/L 147* 147*   ALT (SGPT) U/L 10 12   AST (SGOT) U/L 19 15   GLUCOSE mg/dL 91 88       Fetal Tracing:  Reactive, Category I         Assessment/Plan  1.  Intrauterine pregnancy at 36w1d with gestational diabetes A2, IVF pregnacy, AMA, admitted for monitoring of gestational HTN. Her BP has only been mildly elevated and labs are normal. Protein not sig elevated. She desires outpt monitoring which is reasonable and recommended IOL around 37 wks.  To follow up tomorrow for BP check and Monday with me in the office.        Ijeoma Briceno MD  8/3/2023 18:13 EDT

## 2023-08-03 NOTE — NON STRESS TEST
Khadra Khan, a  at 36w0d with an MEHRDAD of 2023, Date entered prior to episode creation, was seen at [facility] ANTEPARTUM UNIT for a nonstress test.    Chief Complaint   Patient presents with    Elevated Blood Pressure     Denies CTX, LOF, and vaginal bleeding. Reports +FM. Direct admit to antepartum, serial Bps at home that were elevated, denies headache and visual changes currently. Reports increased swelling of extremities.        Patient Active Problem List   Diagnosis    Morbid obesity with BMI of 50.0-59.9, adult    Male factor infertility    Cystic fibrosis carrier    Pregnancy    History of sleeve gastrectomy    AMA (advanced maternal age) multigravida 35+    Pregnancy conceived through in vitro fertilization    Elevated hemoglobin A1c    Subclinical hypothyroidism    High-risk pregnancy in second trimester    Dehydration during pregnancy    Pregnancy resulting from in vitro fertilization in second trimester    Gestational diabetes mellitus (GDM) in childbirth, insulin controlled    Gestational diabetes    Gestational hypertension       Start Time:   Stop Time:     Interpretation A  Nonstress Test Interpretation A: Reactive

## 2023-08-03 NOTE — PAYOR COMM NOTE
"SoniaNelly giraldosy (42 y.o. Female)     [facility]    [address] [place] [id number]  Facility NPI: [id number]    [facility]  Fax: [phone]  Phone: [phone] (Brea: [phone])    Subject: ADMISSION NOTIFICATION AUTH CLINICALS   Reference #:  [id number]  Please don't hesitate to contact me with any questions or concerns.          Date of Birth   1980    Social Security Number       Address   [address] [place], [place] [address]    Home Phone   [phone]    MRN   [id number]       [facility]    Marital Status                               Admission Date   8/2/23    Admission Type   Elective    Admitting Provider   Ijeoma Briceno MD    Attending Provider   Ijeoma Briceno MD    Department, Room/Bed   [facility] ANTEPARTUM UNIT, 3105/1       Discharge Date       Discharge Disposition       Discharge Destination                                 Attending Provider: Ijeoma Briceno MD    Allergies: No Known Allergies    Isolation: None   Infection: None   Code Status: CPR    Ht: 167.6 cm (66\")   Wt: 151 kg (333 lb)    Admission Cmt: None   Principal Problem: Gestational diabetes [O24.419]                   Active Insurance as of 8/2/2023       Primary Coverage       Payor Plan Insurance Group Employer/Plan Group    ANTHEM BLUE CROSS ANTHEM BLUE CROSS BLUE SHIELD PPO [id number]       Payor Plan Address Payor Plan Phone Number Payor Plan Fax Number Effective Dates    [address] [phone]  1/1/2016 - None Entered    [place] [address]         Subscriber Name Subscriber Birth Date Member ID       SANJEEV KHAN 1980 [id number]                     Emergency Contacts        (Rel.) Home Phone Work Phone Mobile Phone    Madhuri Khaney (Spouse) [phone] -- [phone]              Insurance Information                  ANTHEM BLUE CROSS/ANTHEM BLUE CROSS BLUE SHIELD PPO Phone: [phone]    Subscriber: Erin" Khadra Subscriber#: [id number]    Group#: [id number] Precert#: --             History & Physical        Miladys Freitas MD at 23 1836          [facility]  Obstetric History and Physical    Chief Complaint   Patient presents with    Elevated Blood Pressure     Denies CTX, LOF, and vaginal bleeding. Reports +FM. Direct admit to antepartum, serial Bps at home that were elevated, denies headache and visual changes currently. Reports increased swelling of extremities.        Subjective    Patient is a 42 y.o. female  currently at 36w0d, who presents with reports of elevated BP this AM. She notes BP's ranged from 141-145/. She contacted her OB MD and was advised to come to the hospital for admission. She denies headache, vision changes or abdominal pain. She denies chest pain or shortness of air. She does complains of lower ext swelling.    Her prenatal care is complicated by .  Patient Active Problem List   Diagnosis    Morbid obesity with BMI of 50.0-59.9, adult    Male factor infertility    Cystic fibrosis carrier    Pregnancy    History of sleeve gastrectomy    AMA (advanced maternal age) multigravida 35+    Pregnancy conceived through in vitro fertilization    Elevated hemoglobin A1c    Subclinical hypothyroidism    High-risk pregnancy in second trimester    Dehydration during pregnancy    Pregnancy resulting from in vitro fertilization in second trimester    Gestational diabetes mellitus (GDM) in childbirth, insulin controlled    Gestational diabetes    Gestational hypertension     Her previous obstetric/gynecological history is note contributory.     The following portions of the patients history were reviewed and updated as appropriate: current medications, allergies, past medical history, past surgical history, past family history, past social history, and problem list .       Prenatal Information:  Prenatal Results       Initial Prenatal Labs       Test Value Reference Range Date Time     Hemoglobin  12.2 g/dL 12.0 - 15.9 04/11/23 0940       12.6 g/dL 11.1 - 15.9 02/09/23 1026      ^ 12.9 g/dL 12.0 - 16.0 01/23/23 1220    Hematocrit  36.4 % 34.0 - 46.6 04/11/23 0940       39.0 % 34.0 - 46.6 02/09/23 1026      ^ 39.1 % 36.0 - 46.0 01/23/23 1220    Platelets  301 10*3/mm3 140 - 450 04/11/23 0940       352 x10E3/uL 150 - 450 02/09/23 1026      ^ 371 10*3/uL 140 - 440 01/23/23 1220    Rubella IgG  2.18 index Immune >0.99 02/09/23 1026    Hepatitis B SAg  Negative  Negative 02/09/23 1026    Hepatitis C Ab  <0.1 s/co ratio 0.0 - 0.9 02/09/23 1026    RPR  Non Reactive  Non Reactive 02/09/23 1026    T. Pallidum Ab         ABO  O   08/02/23 1720    Rh  Positive   08/02/23 1720    Antibody Screen  Negative  Negative 02/09/23 1026    HIV  Non Reactive  Non Reactive 02/09/23 1026    Urine Culture  25,000 CFU/mL Normal Urogenital Alyx   07/28/23 1109       Final report   06/20/23 1027       >100,000 CFU/mL Mixed Alyx Isolated   05/21/23 2331       Final report   02/09/23 1143    Gonorrhea  Negative  Negative 02/09/23 1143    Chlamydia  Negative  Negative 02/09/23 1143    TSH  0.760 uIU/mL 0.270 - 4.200 04/11/23 0940       0.906 uIU/mL 0.450 - 4.500 02/09/23 1026    HgB A1c   5.7 % 4.8 - 5.6 02/09/23 1026    Varicella IgG  730 index Immune >165 02/09/23 1026    HgB Electrophoresis         Cystic fibrosis                   Fetal testing        Test Value Reference Range Date Time    NIPT        MSAFP  *Screen Negative*   04/11/23 0940    AFP-4                  2nd and 3rd Trimester       Test Value Reference Range Date Time    Hemoglobin (repeated)  11.6 g/dL 12.0 - 15.9 08/02/23 1720       11.6 g/dL 12.0 - 15.9 07/31/23 1508       11.1 g/dL 12.0 - 15.9 05/21/23 2330    Hematocrit (repeated)  34.7 % 34.0 - 46.6 08/02/23 1720       35.5 % 34.0 - 46.6 07/31/23 1508       33.1 % 34.0 - 46.6 05/21/23 2330    Platelets   346 10*3/mm3 140 - 450 08/02/23 1720       347 10*3/mm3 140 - 450 07/31/23 1508       369  10*3/mm3 140 - 450 05/21/23 2330       301 10*3/mm3 140 - 450 04/11/23 0940       352 x10E3/uL 150 - 450 02/09/23 1026      ^ 371 10*3/uL 140 - 440 01/23/23 1220    GCT  192 mg/dL 65 - 139 04/11/23 0940    Antibody Screen (repeated)  Negative   08/02/23 1720       Negative   05/21/23 2330       Negative  Negative 02/09/23 1026    GTT Fasting        GTT 1 Hr        GTT 2 Hr        GTT 3 Hr        Group B Strep  Negative  Negative 07/25/23 1240              Other testing        Test Value Reference Range Date Time    Parvo IgG         CMV IgG                   Drug Screening       Test Value Reference Range Date Time    Amphetamine Screen        Barbiturate Screen        Benzodiazepine Screen        Methadone Screen        Phencyclidine Screen        Opiates Screen        THC Screen        Cocaine Screen        Propoxyphene Screen        Buprenorphine Screen        Methamphetamine Screen        Oxycodone Screen        Tricyclic Antidepressants Screen                  Legend    ^: Historical                          External Prenatal Results       Pregnancy Outside Results - Transcribed From Office Records - See Scanned Records For Details       Test Value Date Time    ABO  O  08/02/23 1720    Rh  Positive  08/02/23 1720    Antibody Screen  Negative  08/02/23 1720       Negative  05/21/23 2330       Negative  02/09/23 1026    Varicella IgG  730 index 02/09/23 1026    Rubella  2.18 index 02/09/23 1026    Hgb  11.6 g/dL 08/02/23 1720       11.6 g/dL 07/31/23 1508       11.1 g/dL 05/21/23 2330       12.2 g/dL 04/11/23 0940       12.6 g/dL 02/09/23 1026      ^ 12.9 g/dL 01/23/23 1220    Hct  34.7 % 08/02/23 1720       35.5 % 07/31/23 1508       33.1 % 05/21/23 2330       36.4 % 04/11/23 0940       39.0 % 02/09/23 1026      ^ 39.1 % 01/23/23 1220    Glucose Fasting GTT       Glucose Tolerance Test 1 hour       Glucose Tolerance Test 3 hour       Gonorrhea (discrete)  Negative  02/09/23 1143    Chlamydia (discrete)  Negative   23 1143    RPR  Non Reactive  23 1026    VDRL       Syphilis Antibody       HBsAg  Negative  23 1026    Herpes Simplex Virus PCR       Herpes Simplex VIrus Culture       HIV  Non Reactive  23 1026    Hep C RNA Quant PCR       Hep C Antibody  <0.1 s/co ratio 23 1026    AFP  61.2 ng/mL 23 0940    Group B Strep  Negative  23 1240    GBS Susceptibility to Clindamycin       GBS Susceptibility to Erythromycin       Fetal Fibronectin       Genetic Testing, Maternal Blood                 Drug Screening       Test Value Date Time    Urine Drug Screen       Amphetamine Screen       Barbiturate Screen       Benzodiazepine Screen       Methadone Screen       Phencyclidine Screen       Opiates Screen       THC Screen       Cocaine Screen       Propoxyphene Screen       Buprenorphine Screen       Methamphetamine Screen       Oxycodone Screen       Tricyclic Antidepressants Screen                 Legend    ^: Historical                             Past OB History:     OB History    Para Term  AB Living   1 0 0 0 0 0   SAB IAB Ectopic Molar Multiple Live Births   0 0 0 0 0 0      # Outcome Date GA Lbr [place]anto/2nd Weight Sex Delivery Anes PTL Lv   1 Current                Past Medical History: Past Medical History:   Diagnosis Date    Arthritis     Asthma     Seasonal, haven't used inhaler for 2 years.    Cystic fibrosis carrier     GERD (gastroesophageal reflux disease)     Gestational diabetes     Nausea & vomiting       Past Surgical History Past Surgical History:   Procedure Laterality Date    COLONOSCOPY      GI issues while in high school.     GASTRIC SLEEVE LAPAROSCOPIC  2020    shekhar will    TONSILLECTOMY      TRUNK SKIN LESION EXCISIONAL BIOPSY      benign lesion    TYMPANOSTOMY TUBE PLACEMENT      WISDOM TOOTH EXTRACTION        Family History: Family History   Problem Relation Age of Onset    Hypertension Father     Anxiety disorder Father     Hypertension  Mother     Anxiety disorder Mother     Depression Mother     Heart disease Paternal Grandfather     Kidney failure Maternal Grandmother         dialysis    Breast cancer Neg Hx     Ovarian cancer Neg Hx     Uterine cancer Neg Hx     Colon cancer Neg Hx     Melanoma Neg Hx     Prostate cancer Neg Hx     Pancreatic cancer Neg Hx     Congenital heart disease Neg Hx     Cystic fibrosis Neg Hx       Social History:  reports that she has never smoked. She has never been exposed to tobacco smoke. She has never used smokeless tobacco.   reports that she does not currently use alcohol.   reports no history of drug use.        General ROS:  .Review of Systems - General ROS: negative for - chills or fever  Ophthalmic ROS: positive for - occ blurred vision  negative for - spots in vision  Respiratory ROS: no cough, shortness of breath, or wheezing  Cardiovascular ROS: negative for - chest pain  Gastrointestinal ROS: negative for - abdominal pain or nausea/vomiting  Genito-Urinary ROS: negative for - vag bleeding, leaking fluid or painful ctx  Neurological ROS: negative for - headaches     Objective      Vital Signs Range for the last 24 hours  Temperature: Temp:  [97.8 øF (36.6 øC)] 97.8 øF (36.6 øC)   Temp Source: Temp src: Oral   BP: BP: (141)/(75) 141/75   Pulse: Heart Rate:  [90-98] 90   Respirations: Resp:  [18] 18   SPO2: SpO2:  [97 %] 97 %   O2 Amount (l/min):     O2 Devices     Weight: Weight:  [151 kg (333 lb)] 151 kg (333 lb)     Physical Examination: General appearance - alert, well appearing, and in no distress  Mental status - alert, oriented to person, place, and time  Chest - clear to auscultation, no wheezes, rales or rhonchi, symmetric air entry  Heart - normal rate and regular rhythm  Abdomen - soft, nontender, gravid  Neurological - alert, oriented, normal speech, no focal findings or movement disorder noted  Extremities - bilateral lower ext with 1+ edema; no cords or tenderness; 2+ DTR's  Skin - normal  coloration and turgor    Presentation: def   Cervix:      Dilation:     Effacement:     Station:         Fetal Heart Rate Assessment   Method: Fetal HR Assessment Method: external   Beats/min: Fetal HR (beats/min): 130   Baseline: Fetal HR Baseline: normal range   Variability: Fetal HR Variability: moderate (amplitude range 6 to 25 bpm)   Accels: Fetal HR Accelerations: greater than/equal to 15 bpm, lasting at least 15 seconds   Decels: Fetal HR Decelerations: absent   Tracing Category:       Uterine Assessment   Method: Method: external tocotransducer   Frequency (min): Contraction Frequency (Minutes): 4-8 (denies feeling contractions)   Ctx Count in 10 min:     Duration:     Intensity: Contraction Intensity: no contractions   Intensity by IUPC:     Resting Tone: Uterine Resting Tone: soft by palpation   Resting Tone by IUPC:     Damar Units:       Laboratory Results:   Lab Results   Component Value Date    WBC 12.09 (H) 08/02/2023    HGB 11.6 (L) 08/02/2023    HCT 34.7 08/02/2023    MCV 84.2 08/02/2023     08/02/2023     Lab Results   Component Value Date    GLUCOSE 91 08/02/2023    BUN 8 08/02/2023    CREATININE 0.72 08/02/2023    EGFRRESULT 86 03/22/2022    EGFR 108.6 08/02/2023    BCR 11.1 08/02/2023    K 4.1 08/02/2023    CO2 21.5 (L) 08/02/2023    CALCIUM 9.0 08/02/2023    PROTENTOTREF 7.1 03/22/2022    ALBUMIN 3.1 (L) 08/02/2023    BILITOT 0.2 08/02/2023    AST 19 08/02/2023    ALT 10 08/02/2023       Assessment & Plan      Gestational diabetes    Morbid obesity with BMI of 50.0-59.9, adult    Cystic fibrosis carrier    AMA (advanced maternal age) multigravida 35+    Subclinical hypothyroidism    Pregnancy resulting from in vitro fertilization in second trimester    Gestational hypertension        Assessment:  1.  Intrauterine pregnancy at 36w0d gestation with reactive fetal status.    2.  Gestational HTN: pt will elevated BP's at home. BP on arrival is mildly increased. She has some occ blurred  vision and swelling. She denies headache.     GDM: will continue current insulin and metformin regimen and monitor glucose. Recent growth u/s on  at 84 % with AC > 99 %. She is followed by MFM.    3.  Obstetrical history significant is not contributory  4.  GBS status:   Strep Gp B Culture   Date Value Ref Range Status   2023 Negative Negative Final     Comment:     Centers for Disease Control and Prevention (CDC) and American Congress  of Obstetricians and Gynecologists (ACOG) guidelines for prevention of   group B streptococcal (GBS) disease specify co-collection of  a vaginal and rectal swab specimen to maximize sensitivity of GBS  detection. Per the CDC and ACOG, swabbing both the lower vagina and  rectum substantially increases the yield of detection compared with  sampling the vagina alone.  Penicillin G, ampicillin, or cefazolin are indicated for intrapartum  prophylaxis of  GBS colonization. Reflex susceptibility  testing should be performed prior to use of clindamycin only on GBS  isolates from penicillin-allergic women who are considered a high risk  for anaphylaxis. Treatment with vancomycin without additional testing  is warranted if resistance to clindamycin is noted.         Plan:  1. Admit, serial BP's, 24 hour urine protein, glucose monitoring and NST twice daily  2. Plan of care has been reviewed with patient and spouse  3.  Risks, benefits of treatment plan have been discussed.  4.  All questions have been answered.      Miladys Freitas MD  2023  18:36 EDT      Electronically signed by Miladys Freitas MD at 23 1847       Facility-Administered Medications as of 8/3/2023   Medication Dose Route Frequency Provider Last Rate Last Admin    acetaminophen (TYLENOL) tablet 1,000 mg  1,000 mg Oral Once Ijeoma Briceno MD        [COMPLETED] acetaminophen (TYLENOL) tablet 650 mg  650 mg Oral Once PRN Miladys Freitas MD   650 mg at 23 5024     busPIRone (BUSPAR) tablet 7.5 mg  7.5 mg Oral BID Miladys Freitas MD   7.5 mg at 08/03/23 0802    cetirizine (zyrTEC) tablet 10 mg  10 mg Oral Nightly Miladys Freitas MD   10 mg at 08/02/23 2249    citalopram (CeleXA) tablet 10 mg  10 mg Oral Daily Miladys Freitas MD   10 mg at 08/02/23 2130    ferrous sulfate tablet 325 mg  325 mg Oral Daily With Breakfast & Dinner Miladys Freitas MD   325 mg at 08/03/23 0802    glucagon (GLUCAGEN) injection 1 mg  1 mg Subcutaneous Once PRN Miladys Freitas MD        insulin lispro (HUMALOG/ADMELOG) injection 17 Units  17 Units Subcutaneous Daily With Lunch Miladys Freitas MD   17 Units at 08/03/23 1140    insulin lispro (HUMALOG/ADMELOG) injection 18 Units  18 Units Subcutaneous Daily With Dinner Miladys Freitas MD   18 Units at 08/02/23 1819    insulin lispro (HUMALOG/ADMELOG) injection 22 Units  22 Units Subcutaneous Daily With Breakfast Miladys Freitas MD   22 Units at 08/03/23 0803    insulin NPH (humuLIN N,novoLIN N) injection 40 Units  40 Units Subcutaneous QAM Miladys Freitas MD   40 Units at 08/03/23 0804    insulin NPH (humuLIN N,novoLIN N) injection 42 Units  42 Units Subcutaneous Nightly Miladys Freitas MD   42 Units at 08/02/23 2236    levothyroxine (SYNTHROID, LEVOTHROID) tablet 75 mcg  75 mcg Oral Q AM Miladys Freitas MD   75 mcg at 08/03/23 0633    magnesium oxide (MAG-OX) tablet 400 mg  400 mg Oral Daily With Dinner Miladys Freitas MD   400 mg at 08/02/23 1951    metFORMIN ER (GLUCOPHAGE-XR) 24 hr tablet 500 mg  500 mg Oral BID With Meals Miladys Freitas MD   500 mg at 08/03/23 0802    ondansetron ODT (ZOFRAN-ODT) disintegrating tablet 4 mg  4 mg Oral Q8H PRN Miladys Freitas MD   4 mg at 08/03/23 0211    pantoprazole (PROTONIX) EC tablet 40 mg  40 mg Oral Daily Miladys Freitas MD   40 mg at 08/03/23 0802    prenatal vitamin tablet 1 tablet  1 tablet Oral Daily Miladys Freitas MD   1  tablet at 08/02/23 2130    promethazine (PHENERGAN) tablet 25 mg  25 mg Oral Q6H PRN Miladys Freitas MD   25 mg at 08/02/23 2342    sodium chloride 0.9 % flush 10 mL  10 mL Intravenous Q12H Miladys Freitas MD   10 mL at 08/02/23 2155    Triamcinolone Acetonide (NASACORT) 55 MCG/ACT nasal inhaler 2 spray  2 spray Nasal Nightly Miladys Freitas MD   2 spray at 08/02/23 2132     Lab Results (last 72 hours)       Procedure Component Value Units Date/Time    POC Glucose Once [[id number]]  (Normal) Collected: 08/03/23 1330    Specimen: Blood Updated: 08/03/23 1331     Glucose 84 mg/dL      Comment: Meter: [id number] : [id number] Shruthi Montoya RN       POC Glucose Once [[id number]]  (Normal) Collected: 08/03/23 1139    Specimen: Blood Updated: 08/03/23 1141     Glucose 92 mg/dL      Comment: Meter: [id number] : [id number] Shruthi Montoya RN       POC Glucose Once [[id number]]  (Normal) Collected: 08/03/23 1026    Specimen: Blood Updated: 08/03/23 1028     Glucose 102 mg/dL      Comment: Meter: [id number] : [id number] Kapil Martin RN       POC Glucose Once [[id number]]  (Normal) Collected: 08/03/23 0758    Specimen: Blood Updated: 08/03/23 0759     Glucose 73 mg/dL      Comment: Meter: [id number] : [id number] Shruthi Montoya RN       POC Glucose Once [[id number]]  (Normal) Collected: 08/03/23 0631    Specimen: Blood Updated: 08/03/23 0633     Glucose 74 mg/dL      Comment: Meter: [id number] : [id number] Fredo Mondragon RN       POC Glucose Once [[id number]]  (Normal) Collected: 08/02/23 2235    Specimen: Blood Updated: 08/02/23 2236     Glucose 95 mg/dL      Comment: Meter: [id number] : [id number] Fredo Mondragon RN       POC Glucose Once [[id number]]  (Normal) Collected: 08/02/23 1843    Specimen: Blood Updated: 08/02/23 1846     Glucose 123 mg/dL      Comment: Meter: [id number] : [id number] Lenin Ho RN       Comprehensive Metabolic Panel [[id number]]  (Abnormal) Collected: 08/02/23 1720     Specimen: Blood Updated: 08/02/23 1807     Glucose 91 mg/dL      BUN 8 mg/dL      Creatinine 0.72 mg/dL      Sodium 138 mmol/L      Potassium 4.1 mmol/L      Chloride 104 mmol/L      CO2 21.5 mmol/L      Calcium 9.0 mg/dL      Total Protein 5.8 g/dL      Albumin 3.1 g/dL      ALT (SGPT) 10 U/L      AST (SGOT) 19 U/L      Alkaline Phosphatase 147 U/L      Total Bilirubin 0.2 mg/dL      Globulin 2.7 gm/dL      A/G Ratio 1.1 g/dL      BUN/Creatinine Ratio 11.1     Anion Gap 12.5 mmol/L      eGFR 108.6 mL/min/1.73     Narrative:      GFR Normal >60  Chronic Kidney Disease <60  Kidney Failure <15      CBC (No Diff) [[id number]]  (Abnormal) Collected: 08/02/23 1720    Specimen: Blood Updated: 08/02/23 1746     WBC 12.09 10*3/mm3      RBC 4.12 10*6/mm3      Hemoglobin 11.6 g/dL      Hematocrit 34.7 %      MCV 84.2 fL      MCH 28.2 pg      MCHC 33.4 g/dL      RDW 13.3 %      RDW-SD 40.5 fl      MPV 10.2 fL      Platelets 346 10*3/mm3           Imaging Results (Last 7 Days)       ** No results found for the last 168 hours. **          ECG/EMG Results (last 72 hours)       ** No results found for the last 72 hours. **          Orders (last 72 hrs)        Start     Ordered    08/03/23 1530  acetaminophen (TYLENOL) tablet 1,000 mg  Once         08/03/23 1434    08/03/23 1435  Code Status and Medical Interventions:  Continuous         08/03/23 1434    08/03/23 1435  Inpatient Admission  Once         08/03/23 1434    08/03/23 1332  POC Glucose Once  PROCEDURE ONCE        Comments: Complete no more than 45 minutes prior to patient eating      08/03/23 1330    08/03/23 1142  POC Glucose Once  PROCEDURE ONCE        Comments: Complete no more than 45 minutes prior to patient eating      08/03/23 1139    08/03/23 1029  POC Glucose Once  PROCEDURE ONCE        Comments: Complete no more than 45 minutes prior to patient eating      08/03/23 1026    08/03/23 0900  pantoprazole (PROTONIX) EC tablet 40 mg  Daily         08/02/23 2641     08/03/23 0900  levothyroxine (SYNTHROID, LEVOTHROID) tablet 75 mcg  Daily,   Status:  Discontinued         08/02/23 1639    08/03/23 0800  metFORMIN ER (GLUCOPHAGE-XR) 24 hr tablet 500 mg  Daily With Breakfast,   Status:  Discontinued         08/02/23 1639    08/03/23 0800  insulin lispro (HUMALOG/ADMELOG) injection 22 Units  Daily With Breakfast         08/02/23 1653    08/03/23 0800  POC Glucose Once  PROCEDURE ONCE        Comments: Complete no more than 45 minutes prior to patient eating      08/03/23 0758    08/03/23 0700  insulin NPH (humuLIN N,novoLIN N) injection 40 Units  Every Morning         08/02/23 1653    08/03/23 0634  POC Glucose Once  PROCEDURE ONCE        Comments: Complete no more than 45 minutes prior to patient eating      08/03/23 0631    08/03/23 0600  levothyroxine (SYNTHROID, LEVOTHROID) tablet 75 mcg  Every Early Morning         08/02/23 1804    08/03/23 0159  ondansetron ODT (ZOFRAN-ODT) disintegrating tablet 4 mg  Every 8 Hours PRN         08/03/23 0159    08/02/23 2245  cetirizine (zyrTEC) tablet 10 mg  Nightly         08/02/23 2145    08/02/23 2245  sodium chloride 0.9 % flush 10 mL  Every 12 Hours Scheduled         08/02/23 2155    08/02/23 2237  POC Glucose Once  PROCEDURE ONCE        Comments: Complete no more than 45 minutes prior to patient eating      08/02/23 2235    08/02/23 2200  POC Glucose 4x Daily Before Meals & at Bedtime  4 Times Daily Before Meals & at Bedtime      Comments: Complete no more than 45 minutes prior to patient eating      08/02/23 1950    08/02/23 2145  acetaminophen (TYLENOL) tablet 650 mg  Once As Needed         08/02/23 2145 08/02/23 2100  busPIRone (BUSPAR) tablet 7.5 mg  2 Times Daily         08/02/23 1639    08/02/23 2100  insulin NPH (humuLIN N,novoLIN N) injection 42 Units  Nightly         08/02/23 1653    08/02/23 2100  Triamcinolone Acetonide (NASACORT) 55 MCG/ACT nasal inhaler 2 spray  Nightly         08/02/23 1851    08/02/23 2000  Fetal  Nonstress Test  2 Times Daily      Comments: Patient presents with:  Elevated Blood Pressure: Denies CTX, LOF, and vaginal bleeding. Reports +FM. Direct admit to antepartum, serial Bps at home that were elevated, denies headache and visual changes currently. Reports increased swelling of extremities.       08/02/23 1653    08/02/23 1915  magnesium oxide (MAG-OX) tablet 400 mg  Daily With Dinner         08/02/23 1827    08/02/23 1900  POC Glucose 4x Daily Fasting & PC  4 Times Daily Fasting & After Meals,   Status:  Canceled      Comments: Complete no more than 45 minutes prior to patient eating      08/02/23 1653    08/02/23 1900  POC Glucose Finger 4x Daily Fasting & PC  4 Times Daily Fasting & After Meals      Comments: Complete no more than 45 minutes prior to patient eating      08/02/23 1653    08/02/23 1900  levothyroxine (SYNTHROID, LEVOTHROID) tablet 75 mcg  Daily With Dinner,   Status:  Discontinued         08/02/23 1804 08/02/23 1847  POC Glucose Once  PROCEDURE ONCE        Comments: Complete no more than 45 minutes prior to patient eating      08/02/23 1843    08/02/23 1800  ferrous sulfate tablet 325 mg  Daily With Breakfast & Dinner         08/02/23 1639    08/02/23 1800  metFORMIN ER (GLUCOPHAGE-XR) 24 hr tablet 500 mg  2 Times Daily With Meals         08/02/23 1653    08/02/23 1800  insulin lispro (HUMALOG/ADMELOG) injection 18 Units  Daily With Dinner         08/02/23 1653    08/02/23 1745  insulin lispro (HUMALOG/ADMELOG) injection 17 Units  Daily With Lunch         08/02/23 1653    08/02/23 1730  citalopram (CeleXA) tablet 10 mg  Daily         08/02/23 1639    08/02/23 1730  magnesium oxide (MAG-OX) tablet 400 mg  Daily,   Status:  Discontinued         08/02/23 1639 08/02/23 1730  prenatal vitamin tablet 1 tablet  Daily         08/02/23 1639    08/02/23 1654  Admit To Obstetrics Inpatient  Once         08/02/23 1653    08/02/23 1654  Vital Signs Per Hospital Policy  Per Hospital Policy          23 1653    23 1654  Notify Provider (Specified)  Until Discontinued         23 1653    23 1654  Notify Provider of Tachysystole (Per Hospital Algorithm)  Until Discontinued         23 1653    23 1654  Notify Provider if Membranes Ruptured, Bleeding Greater Than 1 Pad Per Hour, Fetal Heart Tone Abnormality or Severe Pain  Until Discontinued         23 1653    23 1654  Initiate Group Beta Strep (GBS) Prophylaxis Protocol, If Criteria Met  Continuous        Comments: NO TREATMENT RECOMMENDED IF: 1) Maternal GBS Status Known Negative 2) Scheduled  Birth With Intact Membranes, Not in Labor 3) Maternal GBS Status Unknown, No Risk Factors  TREAT WITH ANTIBIOTICS IF:  1) Maternal GBS Status Known Positive 2) Maternal GBS Status Unknown With Risk Factors: a)  Previous Infant Affected By GBS Infection b) GBS Urinary Tract Infection (UTI) or Bacteriuria During Pregnancy c) Unexplained Maternal Fever (100.4F (38C) or Greater) During Labor d)  Prolonged Rupture of Membranes (18 or More Hours) e) Gestational Age Less Than 37 Weeks    23 1653    08/02/23 1654  CBC (No Diff)  Once         23 1653    23 1654  Type & Screen  Once         23 1653    23 1654  Place Sequential Compression Device  Once         23 1653    08/02/23 1654  Maintain Sequential Compression Device  Continuous         23 1653    23 1654  Diet: Diabetic Diets; Gestational Consistent Carbohydrate; Texture: Regular Texture (IDDSI 7); Fluid Consistency: Thin (IDDSI 0)  Diet Effective Now         23 1653    08/02/23 1654  Protein, Urine, 24 Hour - Urine, Clean Catch  Once         23 1653    23 1654  Comprehensive Metabolic Panel  Once         23 1653    23 1639  ondansetron ODT (ZOFRAN-ODT) disintegrating tablet 4 mg  Every 12 Hours PRN,   Status:  Discontinued         23 1639    23 1636  promethazine (PHENERGAN) tablet  25 mg  Every 6 Hours PRN         08/02/23 1639    08/02/23 1634  glucagon (GLUCAGEN) injection 1 mg  Once As Needed         08/02/23 1639    Signed and Held  Comprehensive Metabolic Panel  Once         Signed and Held    Signed and Held  NST twice daily  Misc Nursing Order (Specify)  Once        Comments: NST twice daily    Signed and Held                  Operative/Procedure Notes (last 72 hours)  Notes from 07/31/23 1455 through 08/03/23 1455   No notes of this type exist for this encounter.       Physician Progress Notes (last 72 hours)  Notes from 07/31/23 1456 through 08/03/23 1456   No notes of this type exist for this encounter.       Consult Notes (last 72 hours)  Notes from 07/31/23 1456 through 08/03/23 1456   No notes of this type exist for this encounter.

## 2023-08-03 NOTE — NON STRESS TEST
Khadra Khan, a  at 36w1d with an MEHRDAD of 2023, Date entered prior to episode creation, was seen at [facility]TEPARTUM UNIT for a nonstress test.    Chief Complaint   Patient presents with    Elevated Blood Pressure     Denies CTX, LOF, and vaginal bleeding. Reports +FM. Direct admit to antepartum, serial Bps at home that were elevated, denies headache and visual changes currently. Reports increased swelling of extremities.        Patient Active Problem List   Diagnosis    Morbid obesity with BMI of 50.0-59.9, adult    Male factor infertility    Cystic fibrosis carrier    Pregnancy    History of sleeve gastrectomy    AMA (advanced maternal age) multigravida 35+    Pregnancy conceived through in vitro fertilization    Elevated hemoglobin A1c    Subclinical hypothyroidism    High-risk pregnancy in second trimester    Dehydration during pregnancy    Pregnancy resulting from in vitro fertilization in second trimester    Gestational diabetes mellitus (GDM) in childbirth, insulin controlled    Gestational diabetes    Gestational hypertension       Start Time: 924  Stop Time: 950    Interpretation A  Nonstress Test Interpretation A: Reactive  Comments A: Appropriate for gestational age

## 2023-08-03 NOTE — NURSING NOTE
Dr. Briceno called on cell phone. Notified her of patient's 24 hour urine result: 185 mg total protein. Patient states her headache is now gone. Patient's blood sugar before her meal was 67. After treatment, patient's blood sugar was 71. Dr. Briceno states she will discharge patient soon.

## 2023-08-03 NOTE — PLAN OF CARE
Goal Outcome Evaluation:           Progress: improving  Outcome Evaluation: VSS with Labile BP's over night (highest 150/90). RNST, reports +FM. Patient denied VB, LOF, contractions, HA, visual changes, RUQ/epigastric pain. Intermittent Nausea throughout the night. Pt awoken by nausea and restless legs. Blood glucose values 95 at bedtime at fasting 74. 24 hour urine in progress until 1510 today.

## 2023-08-03 NOTE — NON STRESS TEST
Khadra Khan, a  at 36w1d with an MEHRDAD of 2023, Date entered prior to episode creation, was seen at [facility]TEPARTUM UNIT for a nonstress test.    Chief Complaint   Patient presents with    Elevated Blood Pressure     Denies CTX, LOF, and vaginal bleeding. Reports +FM. Direct admit to antepartum, serial Bps at home that were elevated, denies headache and visual changes currently. Reports increased swelling of extremities.        Patient Active Problem List   Diagnosis    Morbid obesity with BMI of 50.0-59.9, adult    Male factor infertility    Cystic fibrosis carrier    Pregnancy    History of sleeve gastrectomy    AMA (advanced maternal age) multigravida 35+    Pregnancy conceived through in vitro fertilization    Elevated hemoglobin A1c    Subclinical hypothyroidism    High-risk pregnancy in second trimester    Dehydration during pregnancy    Pregnancy resulting from in vitro fertilization in second trimester    Gestational diabetes mellitus (GDM) in childbirth, insulin controlled    Gestational diabetes    Gestational hypertension    Gestational hypertension, third trimester       Start Time: 924  Stop Time: 950    Interpretation A  Nonstress Test Interpretation A: Reactive  Comments A: Appropriate for gestational age

## 2023-08-03 NOTE — PLAN OF CARE
Goal Outcome Evaluation:              Outcome Evaluation: patient being discharged home today, no prescriptions sent home with patient today. Patient is to follow up with Dr. Brcieno at next scheduled appointment. Patient states understanding. Saline lock removed, pressure dressing applied.

## 2023-08-04 ENCOUNTER — CLINICAL SUPPORT (OUTPATIENT)
Dept: OBSTETRICS AND GYNECOLOGY | Age: 43
End: 2023-08-04
Payer: COMMERCIAL

## 2023-08-04 ENCOUNTER — HOSPITAL ENCOUNTER (OUTPATIENT)
Dept: INFUSION THERAPY | Facility: HOSPITAL | Age: 43
Discharge: HOME | End: 2023-08-04
Payer: COMMERCIAL

## 2023-08-04 VITALS — SYSTOLIC BLOOD PRESSURE: 136 MMHG | DIASTOLIC BLOOD PRESSURE: 86 MMHG

## 2023-08-04 DIAGNOSIS — O13.3 GESTATIONAL HYPERTENSION, THIRD TRIMESTER: Primary | ICD-10-CM

## 2023-08-07 ENCOUNTER — HOSPITAL ENCOUNTER (OUTPATIENT)
Dept: INFUSION THERAPY | Facility: HOSPITAL | Age: 43
Discharge: HOME | End: 2023-08-07
Payer: COMMERCIAL

## 2023-08-07 ENCOUNTER — ROUTINE PRENATAL (OUTPATIENT)
Dept: OBSTETRICS AND GYNECOLOGY | Age: 43
End: 2023-08-07
Payer: COMMERCIAL

## 2023-08-07 VITALS — DIASTOLIC BLOOD PRESSURE: 86 MMHG | WEIGHT: 293 LBS | SYSTOLIC BLOOD PRESSURE: 138 MMHG | BODY MASS INDEX: 54.78 KG/M2

## 2023-08-07 DIAGNOSIS — O13.3 GESTATIONAL HYPERTENSION, THIRD TRIMESTER: ICD-10-CM

## 2023-08-07 DIAGNOSIS — Z3A.36 36 WEEKS GESTATION OF PREGNANCY: Primary | ICD-10-CM

## 2023-08-07 DIAGNOSIS — Z13.89 SCREENING FOR BLOOD OR PROTEIN IN URINE: ICD-10-CM

## 2023-08-07 LAB
CLARITY, POC: CLEAR
COLOR UR: YELLOW
GLUCOSE UR STRIP-MCNC: NEGATIVE MG/DL
PROT UR STRIP-MCNC: ABNORMAL MG/DL

## 2023-08-07 PROCEDURE — 0502F SUBSEQUENT PRENATAL CARE: CPT | Performed by: OBSTETRICS & GYNECOLOGY

## 2023-08-07 NOTE — PROGRESS NOTES
Chief Complaint   Patient presents with    Routine Prenatal Visit     36w5d, BPP today, c/o swelling in hands & feet     Khadra Khan presents for OB follow up, she has outpatient management of gestational HTN. She has been checking BP at home and has been mostly 130's, some 140 and diastolic in the 80-90 range. No HA, visual changes etc  A2GDM- Doesn't have log but has glucometer and I reviewed values. She has actually been having some lows in the afternoon, recommended she go down from 40 to 35 units longacting tomorrow morning.   Plan for IOL tomorrow evening  Cervix is fingertip, plan for cervidil.  BPP 8/8    Follow up PP    Ijeoma Briceno MD  8/7/2023.  16:00 EDT

## 2023-08-08 ENCOUNTER — ANESTHESIA (OUTPATIENT)
Dept: LABOR AND DELIVERY | Facility: HOSPITAL | Age: 43
End: 2023-08-08
Payer: COMMERCIAL

## 2023-08-08 ENCOUNTER — HOSPITAL ENCOUNTER (INPATIENT)
Facility: HOSPITAL | Age: 43
LOS: 4 days | Discharge: HOME | End: 2023-08-12
Attending: OBSTETRICS & GYNECOLOGY | Admitting: OBSTETRICS & GYNECOLOGY
Payer: COMMERCIAL

## 2023-08-08 ENCOUNTER — ANESTHESIA EVENT (OUTPATIENT)
Dept: LABOR AND DELIVERY | Facility: HOSPITAL | Age: 43
End: 2023-08-08
Payer: COMMERCIAL

## 2023-08-08 ENCOUNTER — HOSPITAL ENCOUNTER (OUTPATIENT)
Dept: LABOR AND DELIVERY | Facility: HOSPITAL | Age: 43
Discharge: HOME | End: 2023-08-08
Payer: COMMERCIAL

## 2023-08-08 LAB
ABO GROUP BLD: NORMAL
ALBUMIN SERPL-MCNC: 2.9 G/DL (ref 3.5–5.2)
ALBUMIN/GLOB SERPL: 1.1 G/DL
ALP SERPL-CCNC: 148 U/L (ref 39–117)
ALT SERPL W P-5'-P-CCNC: 14 U/L (ref 1–33)
ANION GAP SERPL CALCULATED.3IONS-SCNC: 10 MMOL/L (ref 5–15)
AST SERPL-CCNC: 17 U/L (ref 1–32)
BASOPHILS # BLD AUTO: 0.02 10*3/MM3 (ref 0–0.2)
BASOPHILS NFR BLD AUTO: 0.2 % (ref 0–1.5)
BILIRUB SERPL-MCNC: 0.2 MG/DL (ref 0–1.2)
BLD GP AB SCN SERPL QL: NEGATIVE
BUN SERPL-MCNC: 11 MG/DL (ref 6–20)
BUN/CREAT SERPL: 18 (ref 7–25)
CALCIUM SPEC-SCNC: 9.1 MG/DL (ref 8.6–10.5)
CHLORIDE SERPL-SCNC: 106 MMOL/L (ref 98–107)
CO2 SERPL-SCNC: 23 MMOL/L (ref 22–29)
CREAT SERPL-MCNC: 0.61 MG/DL (ref 0.57–1)
CREAT UR-MCNC: 283.4 MG/DL
DEPRECATED RDW RBC AUTO: 42.1 FL (ref 37–54)
EGFRCR SERPLBLD CKD-EPI 2021: 116.1 ML/MIN/1.73
EOSINOPHIL # BLD AUTO: 0.11 10*3/MM3 (ref 0–0.4)
EOSINOPHIL NFR BLD AUTO: 0.9 % (ref 0.3–6.2)
ERYTHROCYTE [DISTWIDTH] IN BLOOD BY AUTOMATED COUNT: 13.6 % (ref 12.3–15.4)
GLOBULIN UR ELPH-MCNC: 2.6 GM/DL
GLUCOSE BLDC GLUCOMTR-MCNC: 62 MG/DL (ref 70–130)
GLUCOSE BLDC GLUCOMTR-MCNC: 86 MG/DL (ref 70–130)
GLUCOSE SERPL-MCNC: 58 MG/DL (ref 65–99)
HCT VFR BLD AUTO: 34.7 % (ref 34–46.6)
HGB BLD-MCNC: 11.7 G/DL (ref 12–15.9)
IMM GRANULOCYTES # BLD AUTO: 0.07 10*3/MM3 (ref 0–0.05)
IMM GRANULOCYTES NFR BLD AUTO: 0.6 % (ref 0–0.5)
LYMPHOCYTES # BLD AUTO: 2.57 10*3/MM3 (ref 0.7–3.1)
LYMPHOCYTES NFR BLD AUTO: 21.5 % (ref 19.6–45.3)
MCH RBC QN AUTO: 29 PG (ref 26.6–33)
MCHC RBC AUTO-ENTMCNC: 33.7 G/DL (ref 31.5–35.7)
MCV RBC AUTO: 85.9 FL (ref 79–97)
MONOCYTES # BLD AUTO: 0.72 10*3/MM3 (ref 0.1–0.9)
MONOCYTES NFR BLD AUTO: 6 % (ref 5–12)
NEUTROPHILS NFR BLD AUTO: 70.8 % (ref 42.7–76)
NEUTROPHILS NFR BLD AUTO: 8.48 10*3/MM3 (ref 1.7–7)
NRBC BLD AUTO-RTO: 0 /100 WBC (ref 0–0.2)
PLATELET # BLD AUTO: 366 10*3/MM3 (ref 140–450)
PMV BLD AUTO: 10.5 FL (ref 6–12)
POTASSIUM SERPL-SCNC: 3.8 MMOL/L (ref 3.5–5.2)
PROT SERPL-MCNC: 5.5 G/DL (ref 6–8.5)
PROT UR-MCNC: 36.5 MG/DL
PROT/CREAT UR: 128.8 MG/G CREA (ref 0–200)
RBC # BLD AUTO: 4.04 10*6/MM3 (ref 3.77–5.28)
RH BLD: POSITIVE
SODIUM SERPL-SCNC: 139 MMOL/L (ref 136–145)
T&S EXPIRATION DATE: NORMAL
WBC NRBC COR # BLD: 11.97 10*3/MM3 (ref 3.4–10.8)

## 2023-08-08 PROCEDURE — 86850 RBC ANTIBODY SCREEN: CPT | Performed by: OBSTETRICS & GYNECOLOGY

## 2023-08-08 PROCEDURE — 80053 COMPREHEN METABOLIC PANEL: CPT | Performed by: OBSTETRICS & GYNECOLOGY

## 2023-08-08 PROCEDURE — 82948 REAGENT STRIP/BLOOD GLUCOSE: CPT

## 2023-08-08 PROCEDURE — 86900 BLOOD TYPING SEROLOGIC ABO: CPT | Performed by: OBSTETRICS & GYNECOLOGY

## 2023-08-08 PROCEDURE — 86901 BLOOD TYPING SEROLOGIC RH(D): CPT | Performed by: OBSTETRICS & GYNECOLOGY

## 2023-08-08 PROCEDURE — 85025 COMPLETE CBC W/AUTO DIFF WBC: CPT | Performed by: OBSTETRICS & GYNECOLOGY

## 2023-08-08 RX ORDER — SODIUM CHLORIDE, SODIUM LACTATE, POTASSIUM CHLORIDE, CALCIUM CHLORIDE 600; 310; 30; 20 MG/100ML; MG/100ML; MG/100ML; MG/100ML
125 INJECTION, SOLUTION INTRAVENOUS CONTINUOUS
Status: DISCONTINUED | OUTPATIENT
Start: 2023-08-08 | End: 2023-08-11

## 2023-08-08 RX ORDER — CETIRIZINE HYDROCHLORIDE 10 MG/1
10 TABLET ORAL NIGHTLY
Status: DISCONTINUED | OUTPATIENT
Start: 2023-08-08 | End: 2023-08-12 | Stop reason: HOSPADM

## 2023-08-08 RX ORDER — EPHEDRINE SULFATE 50 MG/ML
5 INJECTION, SOLUTION INTRAVENOUS AS NEEDED
Status: DISCONTINUED | OUTPATIENT
Start: 2023-08-08 | End: 2023-08-11

## 2023-08-08 RX ORDER — ONDANSETRON 2 MG/ML
4 INJECTION INTRAMUSCULAR; INTRAVENOUS EVERY 6 HOURS PRN
Status: DISCONTINUED | OUTPATIENT
Start: 2023-08-08 | End: 2023-08-10 | Stop reason: SDUPTHER

## 2023-08-08 RX ORDER — ONDANSETRON 2 MG/ML
4 INJECTION INTRAMUSCULAR; INTRAVENOUS ONCE AS NEEDED
Status: COMPLETED | OUTPATIENT
Start: 2023-08-08 | End: 2023-08-09

## 2023-08-08 RX ORDER — DIPHENHYDRAMINE HYDROCHLORIDE 50 MG/ML
12.5 INJECTION INTRAMUSCULAR; INTRAVENOUS EVERY 8 HOURS PRN
Status: DISCONTINUED | OUTPATIENT
Start: 2023-08-08 | End: 2023-08-11

## 2023-08-08 RX ORDER — FENTANYL CIT 0.2 MG/100ML-ROPIV 0.2%-NACL 0.9% EPIDURAL INJ 2/0.2 MCG/ML-%
10 SOLUTION INJECTION CONTINUOUS
Status: DISCONTINUED | OUTPATIENT
Start: 2023-08-08 | End: 2023-08-11

## 2023-08-08 RX ORDER — TERBUTALINE SULFATE 1 MG/ML
0.25 INJECTION, SOLUTION SUBCUTANEOUS AS NEEDED
Status: DISCONTINUED | OUTPATIENT
Start: 2023-08-08 | End: 2023-08-11

## 2023-08-08 RX ORDER — FAMOTIDINE 20 MG/1
20 TABLET, FILM COATED ORAL 2 TIMES DAILY PRN
Status: DISCONTINUED | OUTPATIENT
Start: 2023-08-08 | End: 2023-08-11

## 2023-08-08 RX ORDER — MAGNESIUM CARB/ALUMINUM HYDROX 105-160MG
30 TABLET,CHEWABLE ORAL ONCE AS NEEDED
Status: DISCONTINUED | OUTPATIENT
Start: 2023-08-08 | End: 2023-08-11

## 2023-08-08 RX ORDER — ACETAMINOPHEN 325 MG/1
650 TABLET ORAL EVERY 4 HOURS PRN
Status: DISCONTINUED | OUTPATIENT
Start: 2023-08-08 | End: 2023-08-10 | Stop reason: SDUPTHER

## 2023-08-08 RX ORDER — FAMOTIDINE 10 MG/ML
20 INJECTION, SOLUTION INTRAVENOUS 2 TIMES DAILY PRN
Status: DISCONTINUED | OUTPATIENT
Start: 2023-08-08 | End: 2023-08-11

## 2023-08-08 RX ORDER — ONDANSETRON 4 MG/1
4 TABLET, FILM COATED ORAL EVERY 6 HOURS PRN
Status: DISCONTINUED | OUTPATIENT
Start: 2023-08-08 | End: 2023-08-10 | Stop reason: SDUPTHER

## 2023-08-08 RX ORDER — CITALOPRAM HYDROBROMIDE 10 MG/1
10 TABLET ORAL NIGHTLY
Status: DISCONTINUED | OUTPATIENT
Start: 2023-08-08 | End: 2023-08-12 | Stop reason: HOSPADM

## 2023-08-08 RX ORDER — BUSPIRONE HYDROCHLORIDE 15 MG/1
7.5 TABLET ORAL 2 TIMES DAILY
Status: DISCONTINUED | OUTPATIENT
Start: 2023-08-08 | End: 2023-08-12 | Stop reason: HOSPADM

## 2023-08-08 RX ORDER — FAMOTIDINE 10 MG/ML
20 INJECTION, SOLUTION INTRAVENOUS ONCE AS NEEDED
Status: COMPLETED | OUTPATIENT
Start: 2023-08-08 | End: 2023-08-09

## 2023-08-08 RX ADMIN — BUSPIRONE HYDROCHLORIDE 7.5 MG: 15 TABLET ORAL at 23:04

## 2023-08-08 RX ADMIN — CETIRIZINE HYDROCHLORIDE 10 MG: 10 TABLET ORAL at 23:04

## 2023-08-08 RX ADMIN — DINOPROSTONE 10 MG: 10 INSERT VAGINAL at 22:00

## 2023-08-08 RX ADMIN — SODIUM CHLORIDE, POTASSIUM CHLORIDE, SODIUM LACTATE AND CALCIUM CHLORIDE 125 ML/HR: 600; 310; 30; 20 INJECTION, SOLUTION INTRAVENOUS at 21:20

## 2023-08-08 RX ADMIN — CITALOPRAM 10 MG: 10 TABLET, FILM COATED ORAL at 23:04

## 2023-08-09 PROBLEM — O24.419 GESTATIONAL DIABETES: Status: RESOLVED | Noted: 2023-08-02 | Resolved: 2023-08-09

## 2023-08-09 PROBLEM — O09.812 PREGNANCY RESULTING FROM IN VITRO FERTILIZATION IN SECOND TRIMESTER: Status: RESOLVED | Noted: 2023-02-28 | Resolved: 2023-08-09

## 2023-08-09 PROBLEM — O13.3 GESTATIONAL HYPERTENSION, THIRD TRIMESTER: Status: RESOLVED | Noted: 2023-08-03 | Resolved: 2023-08-09

## 2023-08-09 LAB
GLUCOSE BLDC GLUCOMTR-MCNC: 73 MG/DL (ref 70–130)
GLUCOSE BLDC GLUCOMTR-MCNC: 78 MG/DL (ref 70–130)
GLUCOSE BLDC GLUCOMTR-MCNC: 78 MG/DL (ref 70–130)
GLUCOSE BLDC GLUCOMTR-MCNC: 81 MG/DL (ref 70–130)
GLUCOSE BLDC GLUCOMTR-MCNC: 83 MG/DL (ref 70–130)
GLUCOSE BLDC GLUCOMTR-MCNC: 83 MG/DL (ref 70–130)
GLUCOSE BLDC GLUCOMTR-MCNC: 87 MG/DL (ref 70–130)
GLUCOSE BLDC GLUCOMTR-MCNC: 89 MG/DL (ref 70–130)

## 2023-08-09 PROCEDURE — 25010000002 MORPHINE PER 10 MG: Performed by: OBSTETRICS & GYNECOLOGY

## 2023-08-09 PROCEDURE — 82948 REAGENT STRIP/BLOOD GLUCOSE: CPT

## 2023-08-09 PROCEDURE — C1755 CATHETER, INTRASPINAL: HCPCS | Performed by: ANESTHESIOLOGY

## 2023-08-09 PROCEDURE — 25010000002 ONDANSETRON PER 1 MG: Performed by: OBSTETRICS & GYNECOLOGY

## 2023-08-09 PROCEDURE — 88307 TISSUE EXAM BY PATHOLOGIST: CPT

## 2023-08-09 RX ORDER — CARBOPROST TROMETHAMINE 250 UG/ML
250 INJECTION, SOLUTION INTRAMUSCULAR
Status: DISCONTINUED | OUTPATIENT
Start: 2023-08-09 | End: 2023-08-10 | Stop reason: HOSPADM

## 2023-08-09 RX ORDER — METHYLERGONOVINE MALEATE 0.2 MG/ML
200 INJECTION INTRAVENOUS ONCE AS NEEDED
Status: DISCONTINUED | OUTPATIENT
Start: 2023-08-09 | End: 2023-08-10 | Stop reason: HOSPADM

## 2023-08-09 RX ORDER — OXYTOCIN/0.9 % SODIUM CHLORIDE 30/500 ML
2-20 PLASTIC BAG, INJECTION (ML) INTRAVENOUS
Status: DISCONTINUED | OUTPATIENT
Start: 2023-08-09 | End: 2023-08-11

## 2023-08-09 RX ORDER — OXYTOCIN/0.9 % SODIUM CHLORIDE 30/500 ML
999 PLASTIC BAG, INJECTION (ML) INTRAVENOUS ONCE
Status: COMPLETED | OUTPATIENT
Start: 2023-08-10 | End: 2023-08-10

## 2023-08-09 RX ORDER — PHYTONADIONE 1 MG/.5ML
INJECTION, EMULSION INTRAMUSCULAR; INTRAVENOUS; SUBCUTANEOUS
Status: ACTIVE
Start: 2023-08-09 | End: 2023-08-10

## 2023-08-09 RX ORDER — MISOPROSTOL 200 UG/1
800 TABLET ORAL ONCE AS NEEDED
Status: DISCONTINUED | OUTPATIENT
Start: 2023-08-09 | End: 2023-08-10 | Stop reason: HOSPADM

## 2023-08-09 RX ORDER — ERYTHROMYCIN 5 MG/G
OINTMENT OPHTHALMIC
Status: ACTIVE
Start: 2023-08-09 | End: 2023-08-10

## 2023-08-09 RX ORDER — LIDOCAINE HYDROCHLORIDE 15 MG/ML
INJECTION, SOLUTION EPIDURAL; INFILTRATION; INTRACAUDAL; PERINEURAL AS NEEDED
Status: DISCONTINUED | OUTPATIENT
Start: 2023-08-09 | End: 2023-08-09 | Stop reason: SURG

## 2023-08-09 RX ORDER — OXYTOCIN/0.9 % SODIUM CHLORIDE 30/500 ML
250 PLASTIC BAG, INJECTION (ML) INTRAVENOUS CONTINUOUS
Status: ACTIVE | OUTPATIENT
Start: 2023-08-10 | End: 2023-08-10

## 2023-08-09 RX ORDER — LEVOTHYROXINE SODIUM 0.07 MG/1
75 TABLET ORAL EVERY MORNING
Status: DISCONTINUED | OUTPATIENT
Start: 2023-08-09 | End: 2023-08-12 | Stop reason: HOSPADM

## 2023-08-09 RX ORDER — MORPHINE SULFATE 2 MG/ML
2 INJECTION, SOLUTION INTRAMUSCULAR; INTRAVENOUS EVERY 4 HOURS PRN
Status: DISCONTINUED | OUTPATIENT
Start: 2023-08-09 | End: 2023-08-11

## 2023-08-09 RX ADMIN — Medication 10 ML/HR: at 13:49

## 2023-08-09 RX ADMIN — BUSPIRONE HYDROCHLORIDE 7.5 MG: 15 TABLET ORAL at 20:10

## 2023-08-09 RX ADMIN — ACETAMINOPHEN 650 MG: 325 TABLET, FILM COATED ORAL at 02:51

## 2023-08-09 RX ADMIN — LEVOTHYROXINE SODIUM 75 MCG: 0.07 TABLET ORAL at 07:53

## 2023-08-09 RX ADMIN — ONDANSETRON 4 MG: 2 INJECTION INTRAMUSCULAR; INTRAVENOUS at 02:51

## 2023-08-09 RX ADMIN — Medication 2 MILLI-UNITS/MIN: at 11:15

## 2023-08-09 RX ADMIN — BUSPIRONE HYDROCHLORIDE 7.5 MG: 15 TABLET ORAL at 09:03

## 2023-08-09 RX ADMIN — MORPHINE SULFATE 2 MG: 2 INJECTION, SOLUTION INTRAMUSCULAR; INTRAVENOUS at 09:03

## 2023-08-09 RX ADMIN — LIDOCAINE HYDROCHLORIDE 3 ML: 15 INJECTION, SOLUTION EPIDURAL; INFILTRATION; INTRACAUDAL; PERINEURAL at 13:44

## 2023-08-09 RX ADMIN — Medication 10 ML/HR: at 20:07

## 2023-08-09 RX ADMIN — ONDANSETRON 4 MG: 2 INJECTION INTRAMUSCULAR; INTRAVENOUS at 16:09

## 2023-08-09 RX ADMIN — CETIRIZINE HYDROCHLORIDE 10 MG: 10 TABLET ORAL at 20:11

## 2023-08-09 RX ADMIN — CITALOPRAM 10 MG: 10 TABLET, FILM COATED ORAL at 20:11

## 2023-08-09 RX ADMIN — ONDANSETRON 4 MG: 2 INJECTION INTRAMUSCULAR; INTRAVENOUS at 09:03

## 2023-08-09 RX ADMIN — FAMOTIDINE 20 MG: 10 INJECTION INTRAVENOUS at 16:09

## 2023-08-09 RX ADMIN — SODIUM CHLORIDE, POTASSIUM CHLORIDE, SODIUM LACTATE AND CALCIUM CHLORIDE 125 ML/HR: 600; 310; 30; 20 INJECTION, SOLUTION INTRAVENOUS at 14:08

## 2023-08-09 RX ADMIN — SODIUM CHLORIDE, POTASSIUM CHLORIDE, SODIUM LACTATE AND CALCIUM CHLORIDE 125 ML/HR: 600; 310; 30; 20 INJECTION, SOLUTION INTRAVENOUS at 20:20

## 2023-08-09 NOTE — H&P
[facility]  Obstetric History and Physical    Chief Complaint   Patient presents with    Scheduled Induction      36.6 scheduled IOL for GHTN and GDM, pt denies any leaking or bleeding or ctx, reports +FM       Subjective     Patient is a 42 y.o. female  currently at 37w0d, who presents for scheduled induction of labor for GHTN, GDMA2 and AMA.  Patient received cervidil overnight. Reports good fetal movement.     Her prenatal care is complicated by  hypertension  gestational hypertension, diabetes  GDM A2, thyroid disease  hypothryoid, and advanced maternal age  genetic screening was normal.  Her previous obstetric/gynecological history is noted for is non-contributory.    The following portions of the patients history were reviewed and updated as appropriate: current medications, allergies, past medical history, past surgical history, past family history, past social history, and problem list .       Prenatal Information:  Prenatal Results       Initial Prenatal Labs       Test Value Reference Range Date Time    Hemoglobin  12.2 g/dL 12.0 - 15.9 23 0940       12.6 g/dL 11.1 - 15.9 23 1026      ^ 12.9 g/dL 12.0 - 16.0 23 1220    Hematocrit  36.4 % 34.0 - 46.6 23 0940       39.0 % 34.0 - 46.6 23 1026      ^ 39.1 % 36.0 - 46.0 23 1220    Platelets  301 10*3/mm3 140 - 450 23 0940       352 x10E3/uL 150 - 450 23 1026      ^ 371 10*3/uL 140 - 440 23 1220    Rubella IgG  2.18 index Immune >0.99 23 1026    Hepatitis B SAg  Negative  Negative 23 1026    Hepatitis C Ab  <0.1 s/co ratio 0.0 - 0.9 23 1026    RPR  Non Reactive  Non Reactive 23 1026    T. Pallidum Ab         ABO  O   234    Rh  Positive   23 2134    Antibody Screen  Negative  Negative 23 1026    HIV  Non Reactive  Non Reactive 23 1026    Urine Culture  25,000 CFU/mL Normal Urogenital Alyx   23 1109       Final report   23 1027        >100,000 CFU/mL Mixed Alyx Isolated   05/21/23 2331       Final report   02/09/23 1143    Gonorrhea  Negative  Negative 02/09/23 1143    Chlamydia  Negative  Negative 02/09/23 1143    TSH  0.760 uIU/mL 0.270 - 4.200 04/11/23 0940       0.906 uIU/mL 0.450 - 4.500 02/09/23 1026    HgB A1c   5.7 % 4.8 - 5.6 02/09/23 1026    Varicella IgG  730 index Immune >165 02/09/23 1026    HgB Electrophoresis         Cystic fibrosis                   Fetal testing        Test Value Reference Range Date Time    NIPT        MSAFP  *Screen Negative*   04/11/23 0940    AFP-4                  2nd and 3rd Trimester       Test Value Reference Range Date Time    Hemoglobin (repeated)  11.7 g/dL 12.0 - 15.9 08/08/23 2134       11.6 g/dL 12.0 - 15.9 08/02/23 1720       11.6 g/dL 12.0 - 15.9 07/31/23 1508       11.1 g/dL 12.0 - 15.9 05/21/23 2330    Hematocrit (repeated)  34.7 % 34.0 - 46.6 08/08/23 2134       34.7 % 34.0 - 46.6 08/02/23 1720       35.5 % 34.0 - 46.6 07/31/23 1508       33.1 % 34.0 - 46.6 05/21/23 2330    Platelets   366 10*3/mm3 140 - 450 08/08/23 2134       346 10*3/mm3 140 - 450 08/02/23 1720       347 10*3/mm3 140 - 450 07/31/23 1508       369 10*3/mm3 140 - 450 05/21/23 2330       301 10*3/mm3 140 - 450 04/11/23 0940       352 x10E3/uL 150 - 450 02/09/23 1026      ^ 371 10*3/uL 140 - 440 01/23/23 1220    GCT  192 mg/dL 65 - 139 04/11/23 0940    Antibody Screen (repeated)  Negative   08/08/23 2134       Negative   08/02/23 1720       Negative   05/21/23 2330       Negative  Negative 02/09/23 1026    GTT Fasting        GTT 1 Hr        GTT 2 Hr        GTT 3 Hr        Group B Strep  Negative  Negative 07/25/23 1240              Other testing        Test Value Reference Range Date Time    Parvo IgG         CMV IgG                   Drug Screening       Test Value Reference Range Date Time    Amphetamine Screen        Barbiturate Screen        Benzodiazepine Screen        Methadone Screen        Phencyclidine Screen         Opiates Screen        THC Screen        Cocaine Screen        Propoxyphene Screen        Buprenorphine Screen        Methamphetamine Screen        Oxycodone Screen        Tricyclic Antidepressants Screen                  Legend    ^: Historical                          External Prenatal Results       Pregnancy Outside Results - Transcribed From Office Records - See Scanned Records For Details       Test Value Date Time    ABO  O  08/08/23 2134    Rh  Positive  08/08/23 2134    Antibody Screen  Negative  08/08/23 2134       Negative  08/02/23 1720       Negative  05/21/23 2330       Negative  02/09/23 1026    Varicella IgG  730 index 02/09/23 1026    Rubella  2.18 index 02/09/23 1026    Hgb  11.7 g/dL 08/08/23 2134       11.6 g/dL 08/02/23 1720       11.6 g/dL 07/31/23 1508       11.1 g/dL 05/21/23 2330       12.2 g/dL 04/11/23 0940       12.6 g/dL 02/09/23 1026      ^ 12.9 g/dL 01/23/23 1220    Hct  34.7 % 08/08/23 2134       34.7 % 08/02/23 1720       35.5 % 07/31/23 1508       33.1 % 05/21/23 2330       36.4 % 04/11/23 0940       39.0 % 02/09/23 1026      ^ 39.1 % 01/23/23 1220    Glucose Fasting GTT       Glucose Tolerance Test 1 hour       Glucose Tolerance Test 3 hour       Gonorrhea (discrete)  Negative  02/09/23 1143    Chlamydia (discrete)  Negative  02/09/23 1143    RPR  Non Reactive  02/09/23 1026    VDRL       Syphilis Antibody       HBsAg  Negative  02/09/23 1026    Herpes Simplex Virus PCR       Herpes Simplex VIrus Culture       HIV  Non Reactive  02/09/23 1026    Hep C RNA Quant PCR       Hep C Antibody  <0.1 s/co ratio 02/09/23 1026    AFP  61.2 ng/mL 04/11/23 0940    Group B Strep  Negative  07/25/23 1240    GBS Susceptibility to Clindamycin       GBS Susceptibility to Erythromycin       Fetal Fibronectin       Genetic Testing, Maternal Blood                 Drug Screening       Test Value Date Time    Urine Drug Screen       Amphetamine Screen       Barbiturate Screen       Benzodiazepine Screen        Methadone Screen       Phencyclidine Screen       Opiates Screen       THC Screen       Cocaine Screen       Propoxyphene Screen       Buprenorphine Screen       Methamphetamine Screen       Oxycodone Screen       Tricyclic Antidepressants Screen                 Legend    ^: Historical                             Past OB History:     OB History    Para Term  AB Living   1 0 0 0 0 0   SAB IAB Ectopic Molar Multiple Live Births   0 0 0 0 0 0      # Outcome Date GA Lbr Santo/2nd Weight Sex Delivery Anes PTL Lv   1 Current                Past Medical History: Past Medical History:   Diagnosis Date    Anemia     Anxiety     Arthritis     Asthma     Seasonal, haven't used inhaler for 2 years.    Cystic fibrosis carrier     Depression     GERD (gastroesophageal reflux disease)     Gestational diabetes     Nausea & vomiting     PONV (postoperative nausea and vomiting)     Varicella       Past Surgical History Past Surgical History:   Procedure Laterality Date    COLONOSCOPY      GI issues while in high school.     GASTRIC SLEEVE LAPAROSCOPIC  2020    shekhar will    TONSILLECTOMY      TRUNK SKIN LESION EXCISIONAL BIOPSY      benign lesion    TYMPANOSTOMY TUBE PLACEMENT      WISDOM TOOTH EXTRACTION        Family History: Family History   Problem Relation Age of Onset    Hypertension Father     Anxiety disorder Father     Diabetes Father     Hypertension Mother     Anxiety disorder Mother     Depression Mother     Diabetes Mother     Heart disease Paternal Grandfather     Stroke Paternal Grandmother     Kidney failure Maternal Grandmother         dialysis    Diabetes Maternal Grandmother     Breast cancer Neg Hx     Ovarian cancer Neg Hx     Uterine cancer Neg Hx     Colon cancer Neg Hx     Melanoma Neg Hx     Prostate cancer Neg Hx     Pancreatic cancer Neg Hx     Congenital heart disease Neg Hx     Cystic fibrosis Neg Hx       Social History:  reports that she has never smoked. She has never been  exposed to tobacco smoke. She has never used smokeless tobacco.   reports that she does not currently use alcohol.   reports no history of drug use.        Review of Systems   Constitutional:  Negative for chills and fever.   Gastrointestinal:  Negative for abdominal pain.   Genitourinary:  Negative for vaginal bleeding, vaginal discharge and vaginal pain.   All other systems reviewed and are negative.      Objective     Vital Signs Range for the last 24 hours  Temperature: Temp:  [97.9 øF (36.6 øC)-98.3 øF (36.8 øC)] 98.2 øF (36.8 øC)   Temp Source: Temp src: Oral   BP: BP: (127-158)/(60-80) 137/76   Pulse: Heart Rate:  [77-90] 84   Respirations: Resp:  [17-20] 17   SPO2: SpO2:  [99 %] 99 %   O2 Amount (l/min):     O2 Devices Device (Oxygen Therapy): room air   Weight: Weight:  [154 kg (339 lb)] 154 kg (339 lb)     Physical Examination: General appearance - alert, well appearing, and in no distress  Abdomen - obese. Gravid, soft, nontender, nondistended, no masses or organomegaly  Musculoskeletal - no joint tenderness, deformity or swelling  Extremities - peripheral pulses normal, no pedal edema, no clubbing or cyanosis  Skin - normal coloration and turgor, no rashes, no suspicious skin lesions noted    Presentation: Vertex    Cervix: Exam by:     Dilation: Cervical Dilation (cm): 0   Effacement: Cervical Effacement: 30%   Station:       Fetal Heart Rate Assessment   Method: Fetal HR Assessment Method: external   Beats/min: Fetal HR (beats/min): 135 (rn at bedside adjusting monitor. difficulty tracing.)   Baseline: Fetal HR Baseline: normal range   Variability: Fetal HR Variability: moderate (amplitude range 6 to 25 bpm)   Accels: Fetal HR Accelerations: greater than/equal to 15 bpm, lasting at least 15 seconds   Decels: Fetal HR Decelerations: absent (rn at bedside. no audible decels heard.)   Tracing Category:       Uterine Assessment   Method: Method: external tocotransducer, palpation, per patient report    Frequency (min): Contraction Frequency (Minutes): toco not tracing well   Ctx Count in 10 min:     Duration:     Intensity: Contraction Intensity: mild by palpation   Intensity by IUPC:     Resting Tone: Uterine Resting Tone: soft by palpation   Resting Tone by IUPC:     [place] Units:       Laboratory Results:   Results from last 7 days   Lab Units 23  2134   WBC 10*3/mm3 11.97*   HEMOGLOBIN g/dL 11.7*   HEMATOCRIT % 34.7   PLATELETS 10*3/mm3 366     Results from last 7 days   Lab Units 23  2134   SODIUM mmol/L 139   POTASSIUM mmol/L 3.8   CHLORIDE mmol/L 106   CO2 mmol/L 23.0   BUN mg/dL 11   CREATININE mg/dL 0.61   CALCIUM mg/dL 9.1   BILIRUBIN mg/dL 0.2   ALK PHOS U/L 148*   ALT (SGPT) U/L 14   AST (SGOT) U/L 17   GLUCOSE mg/dL 58*       Assessment & Plan       Pregnancy    Morbid obesity with BMI of 50.0-59.9, adult    Cystic fibrosis carrier    History of sleeve gastrectomy    AMA (advanced maternal age) multigravida 35+    Pregnancy conceived through in vitro fertilization    Elevated hemoglobin A1c    Subclinical hypothyroidism    Gestational diabetes mellitus (GDM) in childbirth, insulin controlled    Gestational hypertension      Assessment & Plan    Assessment:  1.  Intrauterine pregnancy at 37w0d gestation with reactive, reassuring fetal status.    2.  induction of labor  for GHTN  with unfavorable cervix  3.  Obstetrical history significant for is non-contributory.  4.  GBS status:   Strep Gp B Culture   Date Value Ref Range Status   2023 Negative Negative Final     Comment:     Centers for Disease Control and Prevention (CDC) and American Congress  of Obstetricians and Gynecologists (ACOG) guidelines for prevention of   group B streptococcal (GBS) disease specify co-collection of  a vaginal and rectal swab specimen to maximize sensitivity of GBS  detection. Per the CDC and ACOG, swabbing both the lower vagina and  rectum substantially increases the yield of detection  compared with  sampling the vagina alone.  Penicillin G, ampicillin, or cefazolin are indicated for intrapartum  prophylaxis of  GBS colonization. Reflex susceptibility  testing should be performed prior to use of clindamycin only on GBS  isolates from penicillin-allergic women who are considered a high risk  for anaphylaxis. Treatment with vancomycin without additional testing  is warranted if resistance to clindamycin is noted.         Plan:  1. fetal and uterine monitoring  continuously, cervical ripening with  Cervidil, labor augmentation  Pitocin, and analgesia with  epidural  2. Plan of care has been reviewed with patient and   3.  Risks, benefits of treatment plan have been discussed.  4.  All questions have been answered.        Marlene Anguiano MD  2023  08:37 EDT

## 2023-08-09 NOTE — ANESTHESIA PROCEDURE NOTES
Labor Epidural      Patient location during procedure: OB  Performed By  Anesthesiologist: Gage Wilde MD  Preanesthetic Checklist  Completed: patient identified, IV checked, site marked, risks and benefits discussed, surgical consent, monitors and equipment checked, pre-op evaluation and timeout performed  Prep:  Pt Position:sitting  Sterile Tech:cap, gloves and mask  Prep:chlorhexidine gluconate and isopropyl alcohol  Monitoring:blood pressure monitoring, continuous pulse oximetry and EKG  Epidural Block Procedure:  Approach:midline  Guidance:landmark technique and palpation technique  Location:L3-L4  Needle Type:Tuohy  Needle Gauge:18 G  Loss of Resistance Medium: saline  Loss of Resistance: 10cm  Cath Depth at skin:15 cm  Paresthesia: none  Aspiration:negative  Test Dose:negative  Number of Attempts: 1  Post Assessment:  Dressing:occlusive dressing applied and secured with tape  Pt Tolerance:patient tolerated the procedure well with no apparent complications  Complications:no

## 2023-08-09 NOTE — PLAN OF CARE
Problem: Adult Inpatient Plan of Care  Goal: Plan of Care Review  Outcome: Ongoing, Progressing  Flowsheets (Taken 8/9/2023 0637)  Plan of Care Reviewed With: patient  Outcome Evaluation: cervidil placed at 2200, fhr remains category 1, pt reportsvery minimal pain and is tolerating, BG and vitals stable.     Problem: Bleeding (Labor)  Goal: Hemostasis  Outcome: Ongoing, Progressing     Problem: Change in Fetal Wellbeing (Labor)  Goal: Stable Fetal Wellbeing  Outcome: Ongoing, Progressing     Problem: Delayed Labor Progression (Labor)  Goal: Effective Progression to Delivery  Outcome: Ongoing, Progressing     Problem: Infection (Labor)  Goal: Absence of Infection Signs and Symptoms  Outcome: Ongoing, Progressing     Problem: Labor Pain (Labor)  Goal: Acceptable Pain Control  Outcome: Ongoing, Progressing     Problem: Uterine Tachysystole (Labor)  Goal: Normal Uterine Contraction Pattern  Outcome: Ongoing, Progressing   Goal Outcome Evaluation:  Plan of Care Reviewed With: patient           Outcome Evaluation: cervidil placed at 2200, fhr remains category 1, pt reportsvery minimal pain and is tolerating, BG and vitals stable.

## 2023-08-09 NOTE — ANESTHESIA PROCEDURE NOTES
Labor Epidural      Patient location during procedure: OB  Performed By  Anesthesiologist: Gage Wilde MD  Preanesthetic Checklist  Completed: patient identified, IV checked, site marked, risks and benefits discussed, surgical consent, monitors and equipment checked, pre-op evaluation and timeout performed  Prep:  Pt Position:sitting  Sterile Tech:cap, gloves and mask  Prep:chlorhexidine gluconate and isopropyl alcohol  Monitoring:blood pressure monitoring, continuous pulse oximetry and EKG  Epidural Block Procedure:  Approach:midline  Guidance:landmark technique and palpation technique  Location:L4-L5  Needle Type:Tuohy  Needle Gauge:18 G  Loss of Resistance Medium: saline  Loss of Resistance: 10cm  Cath Depth at skin:15 cm  Paresthesia: none  Aspiration:negative  Test Dose:negative  Number of Attempts: 2  Post Assessment:  Dressing:occlusive dressing applied and secured with tape  Pt Tolerance:patient tolerated the procedure well with no apparent complications  Complications:no

## 2023-08-09 NOTE — ANESTHESIA PREPROCEDURE EVALUATION
Anesthesia Evaluation                  Airway   Dental      Pulmonary    (+) asthma,  Cardiovascular     (+) hypertension      Neuro/Psych  GI/Hepatic/Renal/Endo    (+) morbid obesity, GERD, diabetes mellitus    Musculoskeletal     Abdominal    Substance History      OB/GYN    (+) Pregnant, pregnancy induced hypertension        Other                        Anesthesia Plan    ASA 3     epidural       Anesthetic plan, risks, benefits, and alternatives have been provided, discussed and informed consent has been obtained with: patient.      CODE STATUS:    Level Of Support Discussed With: Patient  Code Status (Patient has no pulse and is not breathing): CPR (Attempt to Resuscitate)  Medical Interventions (Patient has pulse or is breathing): Full Support

## 2023-08-10 PROCEDURE — 59400 OBSTETRICAL CARE: CPT | Performed by: OBSTETRICS & GYNECOLOGY

## 2023-08-10 PROCEDURE — 3E033VJ INTRODUCTION OF OTHER HORMONE INTO PERIPHERAL VEIN, PERCUTANEOUS APPROACH: ICD-10-PCS | Performed by: OBSTETRICS & GYNECOLOGY

## 2023-08-10 PROCEDURE — 63710000001 ONDANSETRON PER 8 MG: Performed by: OBSTETRICS & GYNECOLOGY

## 2023-08-10 PROCEDURE — 25010000002 ONDANSETRON PER 1 MG: Performed by: OBSTETRICS & GYNECOLOGY

## 2023-08-10 PROCEDURE — 0KQM0ZZ REPAIR PERINEUM MUSCLE, OPEN APPROACH: ICD-10-PCS | Performed by: OBSTETRICS & GYNECOLOGY

## 2023-08-10 RX ORDER — PROMETHAZINE HYDROCHLORIDE 12.5 MG/1
12.5 TABLET ORAL EVERY 4 HOURS PRN
Status: DISCONTINUED | OUTPATIENT
Start: 2023-08-10 | End: 2023-08-12 | Stop reason: HOSPADM

## 2023-08-10 RX ORDER — BISACODYL 10 MG
10 SUPPOSITORY, RECTAL RECTAL DAILY PRN
Status: DISCONTINUED | OUTPATIENT
Start: 2023-08-11 | End: 2023-08-12 | Stop reason: HOSPADM

## 2023-08-10 RX ORDER — HYDROCODONE BITARTRATE AND ACETAMINOPHEN 10; 325 MG/1; MG/1
1 TABLET ORAL EVERY 4 HOURS PRN
Status: DISCONTINUED | OUTPATIENT
Start: 2023-08-10 | End: 2023-08-12 | Stop reason: HOSPADM

## 2023-08-10 RX ORDER — ONDANSETRON 2 MG/ML
4 INJECTION INTRAMUSCULAR; INTRAVENOUS EVERY 6 HOURS PRN
Status: DISCONTINUED | OUTPATIENT
Start: 2023-08-10 | End: 2023-08-12 | Stop reason: HOSPADM

## 2023-08-10 RX ORDER — PRENATAL VIT/IRON FUM/FOLIC AC 27MG-0.8MG
1 TABLET ORAL DAILY
Status: DISCONTINUED | OUTPATIENT
Start: 2023-08-10 | End: 2023-08-12 | Stop reason: HOSPADM

## 2023-08-10 RX ORDER — HYDROCORTISONE 25 MG/G
1 CREAM TOPICAL AS NEEDED
Status: DISCONTINUED | OUTPATIENT
Start: 2023-08-10 | End: 2023-08-12 | Stop reason: HOSPADM

## 2023-08-10 RX ORDER — DOCUSATE SODIUM 100 MG/1
100 CAPSULE, LIQUID FILLED ORAL 2 TIMES DAILY
Status: DISCONTINUED | OUTPATIENT
Start: 2023-08-10 | End: 2023-08-12 | Stop reason: HOSPADM

## 2023-08-10 RX ORDER — PANTOPRAZOLE SODIUM 40 MG/1
40 TABLET, DELAYED RELEASE ORAL
Status: DISCONTINUED | OUTPATIENT
Start: 2023-08-10 | End: 2023-08-12 | Stop reason: HOSPADM

## 2023-08-10 RX ORDER — SODIUM CHLORIDE 0.9 % (FLUSH) 0.9 %
1-10 SYRINGE (ML) INJECTION AS NEEDED
Status: DISCONTINUED | OUTPATIENT
Start: 2023-08-10 | End: 2023-08-12 | Stop reason: HOSPADM

## 2023-08-10 RX ORDER — ACETAMINOPHEN 325 MG/1
650 TABLET ORAL EVERY 6 HOURS PRN
Status: DISCONTINUED | OUTPATIENT
Start: 2023-08-10 | End: 2023-08-12 | Stop reason: HOSPADM

## 2023-08-10 RX ORDER — ONDANSETRON 4 MG/1
4 TABLET, FILM COATED ORAL EVERY 8 HOURS PRN
Status: DISCONTINUED | OUTPATIENT
Start: 2023-08-10 | End: 2023-08-12 | Stop reason: HOSPADM

## 2023-08-10 RX ORDER — OXYTOCIN/0.9 % SODIUM CHLORIDE 30/500 ML
125 PLASTIC BAG, INJECTION (ML) INTRAVENOUS CONTINUOUS PRN
Status: COMPLETED | OUTPATIENT
Start: 2023-08-10 | End: 2023-08-10

## 2023-08-10 RX ORDER — HYDROCODONE BITARTRATE AND ACETAMINOPHEN 5; 325 MG/1; MG/1
1 TABLET ORAL EVERY 4 HOURS PRN
Status: DISCONTINUED | OUTPATIENT
Start: 2023-08-10 | End: 2023-08-12 | Stop reason: HOSPADM

## 2023-08-10 RX ORDER — IBUPROFEN 600 MG/1
600 TABLET ORAL EVERY 6 HOURS PRN
Status: DISCONTINUED | OUTPATIENT
Start: 2023-08-10 | End: 2023-08-12 | Stop reason: HOSPADM

## 2023-08-10 RX ADMIN — DOCUSATE SODIUM 100 MG: 100 CAPSULE, LIQUID FILLED ORAL at 21:11

## 2023-08-10 RX ADMIN — BUSPIRONE HYDROCHLORIDE 7.5 MG: 15 TABLET ORAL at 23:29

## 2023-08-10 RX ADMIN — HYDROCODONE BITARTRATE AND ACETAMINOPHEN 1 TABLET: 5; 325 TABLET ORAL at 10:18

## 2023-08-10 RX ADMIN — DOCUSATE SODIUM 100 MG: 100 CAPSULE, LIQUID FILLED ORAL at 10:19

## 2023-08-10 RX ADMIN — HYDROCODONE BITARTRATE AND ACETAMINOPHEN 1 TABLET: 5; 325 TABLET ORAL at 21:11

## 2023-08-10 RX ADMIN — Medication 999 ML/HR: at 00:06

## 2023-08-10 RX ADMIN — IBUPROFEN 600 MG: 600 TABLET ORAL at 10:18

## 2023-08-10 RX ADMIN — ONDANSETRON HYDROCHLORIDE 4 MG: 4 TABLET, FILM COATED ORAL at 12:35

## 2023-08-10 RX ADMIN — PANTOPRAZOLE SODIUM 40 MG: 40 TABLET, DELAYED RELEASE ORAL at 12:35

## 2023-08-10 RX ADMIN — CETIRIZINE HYDROCHLORIDE 10 MG: 10 TABLET ORAL at 23:29

## 2023-08-10 RX ADMIN — BUSPIRONE HYDROCHLORIDE 7.5 MG: 15 TABLET ORAL at 12:32

## 2023-08-10 RX ADMIN — IBUPROFEN 600 MG: 600 TABLET ORAL at 21:11

## 2023-08-10 RX ADMIN — Medication: at 02:41

## 2023-08-10 RX ADMIN — FAMOTIDINE 20 MG: 20 TABLET, FILM COATED ORAL at 10:18

## 2023-08-10 RX ADMIN — ONDANSETRON 4 MG: 2 INJECTION INTRAMUSCULAR; INTRAVENOUS at 02:42

## 2023-08-10 RX ADMIN — Medication 1 TABLET: at 10:18

## 2023-08-10 RX ADMIN — HYDROCODONE BITARTRATE AND ACETAMINOPHEN 1 TABLET: 5; 325 TABLET ORAL at 02:42

## 2023-08-10 RX ADMIN — CITALOPRAM 10 MG: 10 TABLET, FILM COATED ORAL at 23:29

## 2023-08-10 RX ADMIN — Medication 125 ML/HR: at 01:15

## 2023-08-10 RX ADMIN — IBUPROFEN 600 MG: 600 TABLET ORAL at 02:42

## 2023-08-10 RX ADMIN — LEVOTHYROXINE SODIUM 75 MCG: 0.07 TABLET ORAL at 10:18

## 2023-08-10 NOTE — LACTATION NOTE
This note was copied from a baby's chart.  P1, 37 wk, GDM, gastric sleeve 2020. Rounded on mom who reports attempting to wake baby several times to feed but he has been very sleepy. Showed parents different ways to wake and keep baby alert and reviewed Postpartum and [place] handbook pgs 35-45, [facility] information and normal infant behavior for late  infants. Baby is spitty as well. Positioned baby in football hold on the right breast. Mom was able to express colostrum but baby was sleepy and disinterested in feeding. His bgm was 65 so encouraged mom to try STS, look for feeding cues and call LC for help once baby is more alert.

## 2023-08-10 NOTE — PROGRESS NOTES
Postpartum  Day#1    Subjective:    Chief Complaint   Patient presents with    Scheduled Induction      36.6 scheduled IOL for GHTN and GDM, pt denies any leaking or bleeding or ctx, reports +FM     Pt doing well. Pain well controlled. Lochia normal. Ambulating well. Tolerating regular diet. Voiding without difficulty. No complaints    OB History    Para Term  AB Living   1 1 1 0 0 1   SAB IAB Ectopic Molar Multiple Live Births   0 0 0 0 0 1      # Outcome Date GA Lbr [place]/2nd Weight Sex Delivery Anes PTL Lv   1 Term 23 37w0d 07:46 / 00:31 3134 g (6 lb 14.6 oz) M Vag-Spont EPI N AGUILAR      Birth Comments: Scale 2        Vitals:   Wt Readings from Last 3 Encounters:   23 (!) 154 kg (339 lb)   23 (!) 154 kg (339 lb 6.4 oz)   23 (!) 151 kg (333 lb)     Temp Readings from Last 3 Encounters:   08/10/23 98 øF (36.7 øC) (Oral)   23 98.4 øF (36.9 øC) (Oral)   23 98.4 øF (36.9 øC) (Oral)     BP Readings from Last 3 Encounters:   08/10/23 129/95   23 138/86   23 136/86     Pulse Readings from Last 3 Encounters:   08/10/23 93   23 93   23 87   ROS:      ROS:Pulm: neg for soa  GI: neg for heavy bleeding              Musculoskel: neg for leg pain        LABS:  Lab Results (last 24 hours)       Procedure Component Value Units Date/Time    POC Glucose Once [[id number]]  (Normal) Collected: 23    Specimen: Blood Updated: 23     Glucose 89 mg/dL     POC Glucose Once [[id number]]  (Normal) Collected: 23    Specimen: Blood Updated: 23     Glucose 78 mg/dL     POC Glucose Once [[id number]]  (Normal) Collected: 23 172    Specimen: Blood Updated: 23     Glucose 83 mg/dL     POC Glucose Once [[id number]]  (Normal) Collected: 23 1529    Specimen: Blood Updated: 23 1531     Glucose 87 mg/dL      Comment: Meter: [id number] : [id number] Taco Vogel RN       POC Glucose  Once [[id number]]  (Normal) Collected: 23 1159    Specimen: Blood Updated: 23 1206     Glucose 81 mg/dL      Comment: Meter: [id number] : [id number] Taco Vogel RN               Exam:   Gen: NAD, cooperative, conversive  Abd: Soft, nondistended, fundus is firm below umbilicus, approp tender  Ext: Nontender bilaterally  Lochia normal        Principal Problem:    Pregnancy  Active Problems:    Morbid obesity with BMI of 50.0-59.9, adult    Cystic fibrosis carrier    History of sleeve gastrectomy    AMA (advanced maternal age) multigravida 35+    Pregnancy conceived through in vitro fertilization    Elevated hemoglobin A1c      Overview: 5.7    Subclinical hypothyroidism    Gestational diabetes mellitus (GDM) in childbirth, insulin controlled    Gestational hypertension    Vaginal delivery       Assessment::   Khadra Khan is a 42 y.o. female  s/p Vaginal, Spontaneous       1. PPD#1 , Doing well,   2. Precautions given  3. Routine post partum  care discussed  4. Ambulation encouraged.  5. Circ orders entered, asked to hold 2ary temp, feeding concerns  6. Added Protonix          jlink Pj Carpenter MD     August 10, 2023 10:09 EDT

## 2023-08-10 NOTE — LACTATION NOTE
Pt reports baby has been sleepy. Bgm WNL. Pt wanting assistance with waking and latching baby. LC assisted pt with getting baby latched to right breast using 24 mm nipple shield. Baby sucking some. Gave pt hand pump and encouraged to pump after BF at least 3-4 times a day and supplement all colostrum to baby. Pt attempted to hand express. Encouraged skin to skin and attempt BF at least every 2-3 hours for 10-15 min on each breast. Educated on baby's feeding patterns, hand expression, use and cleaning of hand pump, NS use and cleaning, importance of deep latching and ways to achieve it. Encouraged to call LC as needed. Pt has personal pump    Lactation Consult Note    Evaluation Completed: 8/10/2023 10:38 EDT  Patient Name: Khadra Khan  :  1980  MRN:  [id number]     REFERRAL  INFORMATION:                          Date of Referral: 08/10/23      Maternal Reason for Referral: breastfeeding currently       DELIVERY HISTORY:  Infant First Feeding: breastfeeding      Skin to skin initiation date/time: 8/10/2023  12:00 AM   Skin to skin end date/time: 8/10/2023  1:40 AM        MATERNAL ASSESSMENT:     Breast Shape: pendulous (08/10/23 1030)  Breast Density: soft (08/10/23 1030)  Areola: elastic (08/10/23 103)  Nipples: short (08/10/23 103)                INFANT ASSESSMENT:  Information for the patient's :  Keisha Khan [[id number]]   No past medical history on file.                                                                                                   MATERNAL INFANT FEEDING:     Maternal Emotional State: relaxed (08/10/23 1030)  Infant Positioning: clutch/football (08/10/23 1030)   Signs of Milk Transfer: deep jaw excursions noted (08/10/23 1030)  Pain with Feeding: no (08/10/23 103)                       Latch Assistance: minimal assistance (08/10/23 1030)                               EQUIPMENT TYPE:  Breast Pump Type: manual pump (08/10/23 1030)  Breast Pump Flange Type: hard  (08/10/23 1030)  Breast Pump Flange Size: 24 mm (08/10/23 1030)                        BREAST PUMPING:  Breast Pumping Interventions: post-feed pumping encouraged (08/10/23 1030)       LACTATION REFERRALS:

## 2023-08-10 NOTE — ANESTHESIA POSTPROCEDURE EVALUATION
Patient: Khadra Khan    Procedure Summary       Date: 08/09/23 Room / Location:     Anesthesia Start: 1330 Anesthesia Stop: 2358    Procedure: LABOR ANALGESIA Diagnosis:     Scheduled Providers:  Provider: Steven Altamirano MD    Anesthesia Type: epidural ASA Status: 3            Anesthesia Type: epidural    Vitals  Vitals Value Taken Time   /73 08/10/23 0400   Temp 36.8 øC (98.2 øF) 08/10/23 0250   Pulse 103 08/10/23 0400   Resp 20 08/10/23 0400   SpO2 99 % 08/09/23 2359   Vitals shown include unvalidated device data.        Post Anesthesia Care and Evaluation    Patient location during evaluation: bedside  Patient participation: complete - patient participated  Level of consciousness: awake  Pain management: adequate    Airway patency: patent  Anesthetic complications: No anesthetic complications  PONV Status: none  Cardiovascular status: acceptable  Respiratory status: acceptable  Hydration status: acceptable  Post Neuraxial Block status: Motor and sensory function returned to baseline

## 2023-08-10 NOTE — LACTATION NOTE
Patient wanting assistance with latching baby. LC assisted pt with latching baby to right breast using nipple shield. Baby starting to latch some with a few sucks. Encouraged pt to massage breast and stimulate baby to BF. Baby is breathing up to 80 times a min so RN notified. Encouraged pt to pump after feeds and hand express. Feed all colostrum to baby. Reviewed personal pump use and cleaning. Encouraged to call LC as needed.    Lactation Consult Note    Evaluation Completed: 8/10/2023 18:49 EDT  Patient Name: Khadra Khan  :  1980  MRN:  [id number]     REFERRAL  INFORMATION:                          Date of Referral: 08/10/23      Maternal Reason for Referral: breastfeeding currently       DELIVERY HISTORY:  Infant First Feeding: breastfeeding      Skin to skin initiation date/time: 8/10/2023  12:00 AM   Skin to skin end date/time: 8/10/2023  1:40 AM        MATERNAL ASSESSMENT:     Breast Shape: pendulous (08/10/23 1800)  Breast Density: soft (08/10/23 1800)  Areola: elastic (08/10/23 1800)  Nipples: short (08/10/23 1800)                INFANT ASSESSMENT:  Information for the patient's :  Keisha Khan [[id number]]   No past medical history on file.                                                                                                   MATERNAL INFANT FEEDING:     Maternal Emotional State: relaxed (08/10/23 1800)  Infant Positioning: clutch/football (08/10/23 1800)   Signs of Milk Transfer: deep jaw excursions noted (with nipple shield use) (08/10/23 1800)  Pain with Feeding: no (08/10/23 1030)                       Latch Assistance: minimal assistance (08/10/23 1800)                               EQUIPMENT TYPE:  Breast Pump Type: double electric, personal (08/10/23 1800)  Breast Pump Flange Type: hard (08/10/23 1800)  Breast Pump Flange Size: 24 mm (08/10/23 1800)                        BREAST PUMPING:  Breast Pumping Interventions: post-feed pumping encouraged (08/10/23  1800)       LACTATION REFERRALS:

## 2023-08-10 NOTE — PLAN OF CARE
Problem: Adult Inpatient Plan of Care  Goal: Plan of Care Review  Outcome: Ongoing, Progressing  Flowsheets (Taken 8/10/2023 013)  Progress: improving  Plan of Care Reviewed With:   patient   spouse  Outcome Evaluation: pt and baby recovering without complications  Goal: Patient-Specific Goal (Individualized)  Outcome: Ongoing, Progressing  Goal: Absence of Hospital-Acquired Illness or Injury  Outcome: Ongoing, Progressing  Goal: Optimal Comfort and Wellbeing  Outcome: Ongoing, Progressing  Goal: Readiness for Transition of Care  Outcome: Ongoing, Progressing     Problem: Device-Related Complication Risk (Anesthesia/Analgesia, Neuraxial)  Goal: Safe Infusion Delivery Completion  Outcome: Ongoing, Progressing     Problem: Infection (Anesthesia/Analgesia, Neuraxial)  Goal: Absence of Infection Signs and Symptoms  Outcome: Ongoing, Progressing     Problem: Nausea and Vomiting (Anesthesia/Analgesia, Neuraxial)  Goal: Nausea and Vomiting Relief  Outcome: Ongoing, Progressing     Problem: Pain (Anesthesia/Analgesia, Neuraxial)  Goal: Effective Pain Control  Outcome: Ongoing, Progressing     Problem: Respiratory Compromise (Anesthesia/Analgesia, Neuraxial)  Goal: Effective Oxygenation and Ventilation  Outcome: Ongoing, Progressing     Problem: Sensorimotor Impairment (Anesthesia/Analgesia, Neuraxial)  Goal: Baseline Motor Function  Outcome: Ongoing, Progressing     Problem: Urinary Retention (Anesthesia/Analgesia, Neuraxial)  Goal: Effective Urinary Elimination  Outcome: Ongoing, Progressing     Problem: Hypertensive Disorders in Pregnancy  Goal: Maternal-Fetal Stabilization  Outcome: Ongoing, Progressing     Problem:  Fall Injury Risk  Goal: Absence of Fall, Infant Drop and Related Injury  Outcome: Ongoing, Progressing     Problem: Skin Injury Risk Increased  Goal: Skin Health and Integrity  Outcome: Ongoing, Progressing   Goal Outcome Evaluation:  Plan of Care Reviewed With: patient, spouse        Progress:  improving  Outcome Evaluation: pt and baby recovering without complications

## 2023-08-10 NOTE — L&D DELIVERY NOTE
[facility]   Vaginal Delivery Note    Patient Name: Khadra Khan  : 1980  MRN: [id number]    Date of Delivery: 2023     Diagnosis     Pre & Post-Delivery:  Intrauterine pregnancy at 37w1d  Labor status: Induced Onset of Labor     Pregnancy    Morbid obesity with BMI of 50.0-59.9, adult    Cystic fibrosis carrier    History of sleeve gastrectomy    AMA (advanced maternal age) multigravida 35+    Pregnancy conceived through in vitro fertilization    Elevated hemoglobin A1c    Subclinical hypothyroidism    Gestational diabetes mellitus (GDM) in childbirth, insulin controlled    Gestational hypertension             Problem List    Transfer to Postpartum     Review the Delivery Report for details.     Delivery     Delivery: Vaginal, Spontaneous     YOB: 2021    Time of Birth:  Gestational Age 11:58 PM   37w1d     Anesthesia:      Delivering clinician:     Forceps?   No   Vacuum? No    Shoulder dystocia present: No        Delivery narrative: Patient presented yesterday evening for induction of labor.  She received Cervidil overnight.  After the Cervidil was removed she was checked this morning and noted to be 2 cm.  Pitocin was started.  She then received her epidural.  Amniotomy was then performed which returned clear fluid.  Internal monitors were placed without difficulty.  She progressed along a normal labor curve.  At approximately 11:30 PM she was checked and found to be completely dilated.  She pushed for approximately 20 minutes to deliver a live viable male infant in direct occiput anterior position that restituted to maternal right.  Anterior and posterior shoulders delivered without difficulty. A nuchal cord was which was reduced after delivery.  Infant had good cry color and tone and was moving all 4 extremities and was placed on the mother's chest.  After delayed cord clamping, cord was clamped and cut.  Placenta delivered spontaneously intact with a three-vessel cord and  was sent to pathology for review.  Fundal massage and IV Pitocin were used for hemostasis.  A second-degree laceration was repaired and bladder was drained.  Excellent hemostasis noted at the end of the case.    Infant     Findings: male  infant     Infant observations: Weight: pending   Length:   in  Observations/Comments:  Scale 2      Apgars: 8  @ 1 minute /    9  @ 5 minutes   Infant Name: Kobe      Placenta & Cord         Placenta delivered  Spontaneous  at   8/10/2023 12:06 AM     Cord: 3 vessels  present.   Nuchal Cord?  yes; Number of nuchal loops present:  1    Cord blood obtained: Yes    Cord gases obtained:                Repair     Episiotomy: None     No    Lacerations: Yes  Laceration Information  Laceration Repaired?   Perineal: 2nd  Yes    Periurethral:       Labial:       Sulcus:       Vaginal:       Cervical:         Suture used for repair: 3-0 Vicryl   Estimated Blood Loss:       Quantitative Blood Loss: Quantitative Blood Loss (mL): 415 mL (08/10/23 0015)        Complications     none    Disposition     Mother to Remain in LD  in stable condition currently.  Baby to remains with mom  in stable condition currently.    Marlene Anguiano MD  08/10/23  00:27 EDT

## 2023-08-11 LAB
BASOPHILS # BLD AUTO: 0.03 10*3/MM3 (ref 0–0.2)
BASOPHILS NFR BLD AUTO: 0.3 % (ref 0–1.5)
DEPRECATED RDW RBC AUTO: 41.9 FL (ref 37–54)
EOSINOPHIL # BLD AUTO: 0.19 10*3/MM3 (ref 0–0.4)
EOSINOPHIL NFR BLD AUTO: 1.7 % (ref 0.3–6.2)
ERYTHROCYTE [DISTWIDTH] IN BLOOD BY AUTOMATED COUNT: 13.6 % (ref 12.3–15.4)
GLUCOSE BLDC GLUCOMTR-MCNC: 104 MG/DL (ref 70–130)
HCT VFR BLD AUTO: 27 % (ref 34–46.6)
HGB BLD-MCNC: 9 G/DL (ref 12–15.9)
IMM GRANULOCYTES # BLD AUTO: 0.09 10*3/MM3 (ref 0–0.05)
IMM GRANULOCYTES NFR BLD AUTO: 0.8 % (ref 0–0.5)
LYMPHOCYTES # BLD AUTO: 2.22 10*3/MM3 (ref 0.7–3.1)
LYMPHOCYTES NFR BLD AUTO: 20 % (ref 19.6–45.3)
MCH RBC QN AUTO: 28.8 PG (ref 26.6–33)
MCHC RBC AUTO-ENTMCNC: 33.3 G/DL (ref 31.5–35.7)
MCV RBC AUTO: 86.3 FL (ref 79–97)
MONOCYTES # BLD AUTO: 0.6 10*3/MM3 (ref 0.1–0.9)
MONOCYTES NFR BLD AUTO: 5.4 % (ref 5–12)
NEUTROPHILS NFR BLD AUTO: 7.98 10*3/MM3 (ref 1.7–7)
NEUTROPHILS NFR BLD AUTO: 71.8 % (ref 42.7–76)
NRBC BLD AUTO-RTO: 0 /100 WBC (ref 0–0.2)
PLATELET # BLD AUTO: 269 10*3/MM3 (ref 140–450)
PMV BLD AUTO: 10.2 FL (ref 6–12)
RBC # BLD AUTO: 3.13 10*6/MM3 (ref 3.77–5.28)
WBC NRBC COR # BLD: 11.11 10*3/MM3 (ref 3.4–10.8)

## 2023-08-11 PROCEDURE — 82948 REAGENT STRIP/BLOOD GLUCOSE: CPT

## 2023-08-11 PROCEDURE — 85025 COMPLETE CBC W/AUTO DIFF WBC: CPT | Performed by: OBSTETRICS & GYNECOLOGY

## 2023-08-11 PROCEDURE — 63710000001 ONDANSETRON PER 8 MG: Performed by: OBSTETRICS & GYNECOLOGY

## 2023-08-11 PROCEDURE — 25010000002 ENOXAPARIN PER 10 MG: Performed by: OBSTETRICS & GYNECOLOGY

## 2023-08-11 RX ORDER — TRANEXAMIC ACID 10 MG/ML
1000 INJECTION, SOLUTION INTRAVENOUS ONCE AS NEEDED
Status: DISCONTINUED | OUTPATIENT
Start: 2023-08-11 | End: 2023-08-12 | Stop reason: HOSPADM

## 2023-08-11 RX ORDER — ENOXAPARIN SODIUM 100 MG/ML
40 INJECTION SUBCUTANEOUS EVERY 12 HOURS
Status: DISCONTINUED | OUTPATIENT
Start: 2023-08-11 | End: 2023-08-12 | Stop reason: HOSPADM

## 2023-08-11 RX ADMIN — HYDROCODONE BITARTRATE AND ACETAMINOPHEN 1 TABLET: 5; 325 TABLET ORAL at 09:53

## 2023-08-11 RX ADMIN — PANTOPRAZOLE SODIUM 40 MG: 40 TABLET, DELAYED RELEASE ORAL at 05:07

## 2023-08-11 RX ADMIN — LEVOTHYROXINE SODIUM 75 MCG: 0.07 TABLET ORAL at 06:47

## 2023-08-11 RX ADMIN — IBUPROFEN 600 MG: 600 TABLET ORAL at 21:58

## 2023-08-11 RX ADMIN — IBUPROFEN 600 MG: 600 TABLET ORAL at 12:54

## 2023-08-11 RX ADMIN — DOCUSATE SODIUM 100 MG: 100 CAPSULE, LIQUID FILLED ORAL at 21:58

## 2023-08-11 RX ADMIN — BUSPIRONE HYDROCHLORIDE 7.5 MG: 15 TABLET ORAL at 09:25

## 2023-08-11 RX ADMIN — BUSPIRONE HYDROCHLORIDE 7.5 MG: 15 TABLET ORAL at 21:59

## 2023-08-11 RX ADMIN — Medication 1 TABLET: at 09:25

## 2023-08-11 RX ADMIN — ENOXAPARIN SODIUM 40 MG: 100 INJECTION SUBCUTANEOUS at 12:53

## 2023-08-11 RX ADMIN — DOCUSATE SODIUM 100 MG: 100 CAPSULE, LIQUID FILLED ORAL at 09:25

## 2023-08-11 RX ADMIN — ONDANSETRON HYDROCHLORIDE 4 MG: 4 TABLET, FILM COATED ORAL at 10:03

## 2023-08-11 RX ADMIN — CITALOPRAM 10 MG: 10 TABLET, FILM COATED ORAL at 21:59

## 2023-08-11 RX ADMIN — IBUPROFEN 600 MG: 600 TABLET ORAL at 04:58

## 2023-08-11 RX ADMIN — CETIRIZINE HYDROCHLORIDE 10 MG: 10 TABLET ORAL at 21:59

## 2023-08-11 NOTE — LACTATION NOTE
This note was copied from a baby's chart.  Called into room to discuss feeding plan since baby has bf very little since birth and has had no supplement. BGM's stable today but he is spitting up amniotic fluid still and burping often. Mom hx, IVF, AMA, gastric sleeve 2020, GDM. She has been using a HP and now her spectra with drops of colostrum but not enough to syringe. Encouraged pumping every 3 hours after feeds to ensure supply d/t risk factors.Talked with RN about Peds consult for lavage/gavage.Parents agreeable for Neosure supplement if needed. Baby is going to nsy for 24 hr vs and a BGM will be checked. Parents did not have any questions at this time and will call LC for any needs.

## 2023-08-11 NOTE — PROGRESS NOTES
"[facility]  Vaginal Delivery Progress Note    Subjective   Postpartum Day 2: Vaginal Delivery    The patient feels well.  Her pain is adequately controlled.  She notes more swelling in both lower extremities. She notes she has ambulated some but reports this has been limited due to swelling and some cramping/vaginal discomfort.  Patient describes her bleeding as thin lochia.    She notes her infant is not feeding well and will require ongoing hospitalization for monitoring.     ROS:  Neuro: neg for headache or vision changes  Pulm: neg for shortness of air  CV: pos for increased swelling, neg for chest pain  : neg for heavy bleeding  Musculoskeletal: neg for leg pain    Breastfeeding: with difficulty.    Objective     Vital Signs Range for the last 24 hours  Temperature: Temp:  [97.6 øF (36.4 øC)-98.2 øF (36.8 øC)] 97.6 øF (36.4 øC)   Temp Source: Temp src: Oral   BP: BP: (120-133)/(77-83) 120/78   Pulse: Heart Rate:  [80-96] 80   Respirations: Resp:  [18] 18   SPO2:     O2 Amount (l/min):     O2 Devices     Weight:       Admit Height:  Height: 167.6 cm (66\")      Physical Exam:  General:  no acute distress.  Heart: regular rate and rhythm  Abdomen: Fundus: appropriate, firm, non tender  Extremities: Bilateral lower extremities with 1+ edema; no cords or tenderness; 1+ DTR's    Lab results reviewed:  labs:   Lab Results   Component Value Date    WBC 11.11 (H) 08/11/2023    HGB 9.0 (L) 08/11/2023    HCT 27.0 (L) 08/11/2023    MCV 86.3 08/11/2023     08/11/2023     Rubella:  prenatal record --    Rubella Antibodies, IgG   Date Value Ref Range Status   02/09/2023 2.18 Immune >0.99 index Final     Comment:                                     Non-immune       <0.90                                  Equivocal  0.90 - 0.99                                  Immune           >0.99       Rh Status:    RH type   Date Value Ref Range Status   08/08/2023 Positive  Final     Rh Factor   Date Value Ref Range Status "   02/09/2023 Positive  Final     Comment:     Please note: Prior records for this patient's ABO / Rh type are not  available for additional verification.       Immunizations:   Immunization History   Administered Date(s) Administered    COVID-19 (PFIZER) BIVALENT 12+YRS 10/21/2022    COVID-19 (PFIZER) Purple Cap Monovalent 03/02/2021, 03/24/2021, 11/20/2021    Tdap 06/13/2023       Assessment & Plan       Pregnancy    Morbid obesity with BMI of 50.0-59.9, adult    Cystic fibrosis carrier    History of sleeve gastrectomy    AMA (advanced maternal age) multigravida 35+    Pregnancy conceived through in vitro fertilization    Elevated hemoglobin A1c    Subclinical hypothyroidism    Gestational diabetes mellitus (GDM) in childbirth, insulin controlled    Gestational hypertension    Vaginal delivery      Khadra [place] is Day 2  post-partum    Vaginal, Spontaneous      Pt denies heavy bleeding or severe pain currently.    Gestational HTN: BP's are currently stable. Pt notes worsening edema today and is concerned regarding evolving postpartum preeclampsia. She requests ongoing hospitalization/observation.     Increased BMI: pt noting some limitations to regular ambulation but feels more able today. Encouraged frequent ambulation and ordered SCD's while in bed and lovenox for prophylaxis      Circumcision held due infant feeding issues.     Plan:  Continue current care.      Miladys Freitas MD  8/11/2023  11:32 EDT

## 2023-08-11 NOTE — LACTATION NOTE
Pt reports she attempts to latch baby every feeding. She then pumps and is getting drops of colostrum. Baby is getting formula supplement. Encouraged pt to cont to BF/pump every 3 hours and call LC as needed.    Lactation Consult Note    Evaluation Completed: 2023 14:40 EDT  Patient Name: Khadra Khan  :  1980  MRN:  [id number]     REFERRAL  INFORMATION:                          Date of Referral: 08/10/23      Maternal Reason for Referral: breastfeeding currently       DELIVERY HISTORY:  Infant First Feeding: breastfeeding      Skin to skin initiation date/time: 8/10/2023  12:00 AM   Skin to skin end date/time: 8/10/2023  1:40 AM        MATERNAL ASSESSMENT:     Breast Shape: pendulous (08/10/23 1800)  Breast Density: soft (08/10/23 1800)  Areola: elastic (08/10/23 1800)  Nipples: short (08/10/23 1800)                INFANT ASSESSMENT:  Information for the patient's :  Keisha Khan [[id number]]   History reviewed. No pertinent past medical history.                                                                                                   MATERNAL INFANT FEEDING:     Maternal Emotional State: relaxed (08/10/23 1800)  Infant Positioning: clutch/football (08/10/23 1800)   Signs of Milk Transfer: deep jaw excursions noted (with nipple shield use) (08/10/23 1800)  Pain with Feeding: no (08/10/23 1030)                       Latch Assistance: minimal assistance (08/10/23 1800)                               EQUIPMENT TYPE:  Breast Pump Type: double electric, personal (08/10/23 1800)  Breast Pump Flange Type: hard (08/10/23 1800)  Breast Pump Flange Size: 24 mm (08/10/23 1800)                        BREAST PUMPING:  Breast Pumping Interventions: post-feed pumping encouraged (08/10/23 1800)       LACTATION REFERRALS:

## 2023-08-11 NOTE — LACTATION NOTE
This note was copied from a baby's chart.  Update per RN. 32 ml of air removed during lavage. Baby was fed 10 ml of Neosure and has spit up several times since. RN working with Peds on feeding plan.

## 2023-08-12 VITALS
BODY MASS INDEX: 47.09 KG/M2 | HEART RATE: 83 BPM | DIASTOLIC BLOOD PRESSURE: 78 MMHG | WEIGHT: 293 LBS | SYSTOLIC BLOOD PRESSURE: 123 MMHG | OXYGEN SATURATION: 97 % | RESPIRATION RATE: 20 BRPM | TEMPERATURE: 98 F | HEIGHT: 66 IN

## 2023-08-12 PROCEDURE — 25010000002 ENOXAPARIN PER 10 MG: Performed by: OBSTETRICS & GYNECOLOGY

## 2023-08-12 PROCEDURE — 0503F POSTPARTUM CARE VISIT: CPT | Performed by: OBSTETRICS & GYNECOLOGY

## 2023-08-12 RX ORDER — IBUPROFEN 800 MG/1
800 TABLET ORAL EVERY 8 HOURS PRN
Qty: 40 TABLET | Refills: 2 | Status: SHIPPED | OUTPATIENT
Start: 2023-08-12

## 2023-08-12 RX ORDER — FUROSEMIDE 40 MG/1
40 TABLET ORAL DAILY
Qty: 5 TABLET | Refills: 0 | Status: SHIPPED | OUTPATIENT
Start: 2023-08-12 | End: 2023-08-15

## 2023-08-12 RX ORDER — HYDROCODONE BITARTRATE AND ACETAMINOPHEN 5; 325 MG/1; MG/1
1 TABLET ORAL EVERY 6 HOURS PRN
Qty: 10 TABLET | Refills: 0 | Status: SHIPPED | OUTPATIENT
Start: 2023-08-12

## 2023-08-12 RX ADMIN — PANTOPRAZOLE SODIUM 40 MG: 40 TABLET, DELAYED RELEASE ORAL at 06:42

## 2023-08-12 RX ADMIN — BUSPIRONE HYDROCHLORIDE 7.5 MG: 15 TABLET ORAL at 09:18

## 2023-08-12 RX ADMIN — ENOXAPARIN SODIUM 40 MG: 100 INJECTION SUBCUTANEOUS at 12:14

## 2023-08-12 RX ADMIN — HYDROCODONE BITARTRATE AND ACETAMINOPHEN 1 TABLET: 5; 325 TABLET ORAL at 06:42

## 2023-08-12 RX ADMIN — LEVOTHYROXINE SODIUM 75 MCG: 0.07 TABLET ORAL at 06:42

## 2023-08-12 RX ADMIN — DOCUSATE SODIUM 100 MG: 100 CAPSULE, LIQUID FILLED ORAL at 09:18

## 2023-08-12 RX ADMIN — IBUPROFEN 600 MG: 600 TABLET ORAL at 04:25

## 2023-08-12 RX ADMIN — Medication 1 TABLET: at 09:18

## 2023-08-12 RX ADMIN — ENOXAPARIN SODIUM 40 MG: 100 INJECTION SUBCUTANEOUS at 00:32

## 2023-08-12 RX ADMIN — IBUPROFEN 600 MG: 600 TABLET ORAL at 12:27

## 2023-08-12 NOTE — LACTATION NOTE
This note was copied from a baby's chart.  PT is possibly going home today. Mom reports baby is latching some, but she is also pumping and  formula feeding. Educated on the importance of stimulation for adequate milk supply and on baby's expected output and weight gain. [facility] and and mommy and Me info on the back of the educational booklet. Discussed engorgement,pumping, milk storage, colostrum expectations and when to expect mature milk supply. PT declines any questions and concerns at this time. Encouraged to call LC if needing further assistance.

## 2023-08-12 NOTE — PROGRESS NOTES
[facility]   Obstetrics and Gynecology     2023    Name:Khadra Khan   MR#:[id number]    Vaginal Delivery Progress Note    HD#4    Subjective   Postpartum Day 2: 42 y.o. female  delivered at 37w0d  delivered a male  infant.     The patient feels well.  Her pain is controlled.    She is ambulating well.  Patient describes her bleeding as thin lochia.    Breastfeeding/bottle:  The patient is currently breastfeeding.    Patient Active Problem List   Diagnosis    Morbid obesity with BMI of 50.0-59.9, adult    Male factor infertility    Cystic fibrosis carrier    Pregnancy    History of sleeve gastrectomy    AMA (advanced maternal age) multigravida 35+    Pregnancy conceived through in vitro fertilization    Elevated hemoglobin A1c    Subclinical hypothyroidism    High-risk pregnancy in second trimester    Dehydration during pregnancy    Gestational diabetes mellitus (GDM) in childbirth, insulin controlled    Gestational hypertension    Vaginal delivery       Objective   Vital Signs Range for the last 24 hours  Temp: Temp:  [97.9 øF (36.6 øC)-98.4 øF (36.9 øC)] 98 øF (36.7 øC) Temp src: Oral   BP: BP: (118-131)/(71-83) 123/78        Pulse: Heart Rate:  [83-96] 83  RR: Resp:  [18-20] 20  Weight: (!) 154 kg (339 lb)  BMI:  Body mass index is 54.72 kg/mý.    Results from last 7 days   Lab Units 23  0724 23  2134   WBC 10*3/mm3 11.11* 11.97*   HEMOGLOBIN g/dL 9.0* 11.7*   HEMATOCRIT % 27.0* 34.7   PLATELETS 10*3/mm3 269 366     Results from last 7 days   Lab Units 23  2134   SODIUM mmol/L 139   POTASSIUM mmol/L 3.8   CHLORIDE mmol/L 106   CO2 mmol/L 23.0   BUN mg/dL 11   CREATININE mg/dL 0.61   CALCIUM mg/dL 9.1   ALK PHOS U/L 148*   ALT (SGPT) U/L 14   AST (SGOT) U/L 17   GLUCOSE mg/dL 58*       Physical Exam  Vitals and nursing note reviewed.   Constitutional:       General: She is not in acute distress.     Appearance: Normal appearance. She is well-developed. She is not  ill-appearing.   HENT:      Head: Normocephalic.   Pulmonary:      Effort: Pulmonary effort is normal.   Abdominal:      General: Abdomen is flat. There is no distension.      Palpations: Abdomen is soft. There is no mass.      Tenderness: There is no abdominal tenderness.   Genitourinary:     Vagina: Normal.      Comments: Lochia is scant  Fundus is firm   Musculoskeletal:         General: Swelling present. No tenderness.      Right lower leg: Edema present.      Left lower leg: Edema present.      Comments: No calf tenderness or swelling    Neurological:      Mental Status: She is alert and oriented to person, place, and time.   Psychiatric:         Behavior: Behavior normal.         Thought Content: Thought content normal.         Judgment: Judgment normal.       Assessment/Plan   1.  PPD# 3    Pregnancy    Morbid obesity with BMI of 50.0-59.9, adult    Cystic fibrosis carrier    History of sleeve gastrectomy    AMA (advanced maternal age) multigravida 35+    Pregnancy conceived through in vitro fertilization    Elevated hemoglobin A1c    Subclinical hypothyroidism    Gestational diabetes mellitus (GDM) in childbirth, insulin controlled    Gestational hypertension    Vaginal delivery      Plan:   Discharge today     Mahin Donaldson MD  8/12/2023 09:31 EDT

## 2023-08-13 ENCOUNTER — PATIENT MESSAGE (OUTPATIENT)
Dept: OBSTETRICS AND GYNECOLOGY | Age: 43
End: 2023-08-13
Payer: COMMERCIAL

## 2023-08-13 NOTE — DISCHARGE SUMMARY
[facility]   Obstetrics and Gynecology   Vaginal delivery Discharge Summary      Date of Admission: 2023    Date of Discharge:  2023      Patient: Khadra Khan      MR#:[id number]    Surgeon/OB: Marlene Aguayo Anguiano     Discharge Diagnosis:   Vaginal Delivery at 37w0d, uncomplicated recovery    Pregnancy    Morbid obesity with BMI of 50.0-59.9, adult    Cystic fibrosis carrier    History of sleeve gastrectomy    AMA (advanced maternal age) multigravida 35+    Pregnancy conceived through in vitro fertilization    Elevated hemoglobin A1c    Subclinical hypothyroidism    Gestational diabetes mellitus (GDM) in childbirth, insulin controlled    Gestational hypertension    Vaginal delivery      Procedures:  Vaginal, Spontaneous     2023    11:58 PM      Anesthesia:  Epidural     Hospital Course  Patient is a 42 y.o. female  at 37w0d status post vaginal delivery.    Uneventful recovery.  Patient is ambulating, tolerating a regular diet.  Perineum is intact.    The patient had fairly significant lower extremity edema as as observed an additional day.  Lasix x 5 days was given as a discharge medication.     Infant:   male  fetus 3134 g (6 lb 14.6 oz)  with Apgar scores of 8 , 9  at five minutes.    Condition on Discharge:  Stable    Vital Signs       Results from last 7 days   Lab Units 23  0724 23  2134   WBC 10*3/mm3 11.11* 11.97*   HEMOGLOBIN g/dL 9.0* 11.7*   HEMATOCRIT % 27.0* 34.7   PLATELETS 10*3/mm3 269 366     Results from last 7 days   Lab Units 23  2134   SODIUM mmol/L 139   POTASSIUM mmol/L 3.8   CHLORIDE mmol/L 106   CO2 mmol/L 23.0   BUN mg/dL 11   CREATININE mg/dL 0.61   CALCIUM mg/dL 9.1   BILIRUBIN mg/dL 0.2   ALK PHOS U/L 148*   ALT (SGPT) U/L 14   AST (SGOT) U/L 17   GLUCOSE mg/dL 58*         Discharge Disposition  Home or Self Care    Discharge Medications     Discharge Medications      New Medications      Instructions Start Date   furosemide 40 MG  tablet  Commonly known as: Lasix   40 mg, Oral, Daily      HYDROcodone-acetaminophen 5-325 MG per tablet  Commonly known as: Norco   1 tablet, Oral, Every 6 Hours PRN      ibuprofen 800 MG tablet  Commonly known as: ADVIL,MOTRIN   800 mg, Oral, Every 8 Hours PRN         Continue These Medications      Instructions Start Date   Alcohol Pads 70 % pads   1 Units, Does not apply, See Admin Instructions, Use to check blood sugars 7 times a day      B-D UF III MINI PEN NEEDLES 31G X 5 MM misc  Generic drug: Insulin Pen Needle   1 Units, Does not apply, Daily      B-D UF III MINI PEN NEEDLES 31G X 5 MM misc  Generic drug: Insulin Pen Needle   Use to give insulin  5 times a day      busPIRone 7.5 MG tablet  Commonly known as: BUSPAR   7.5 mg, Oral, 2 Times Daily      citalopram 10 MG tablet  Commonly known as: CeleXA   10 mg, Oral, Daily      ferrous sulfate 325 (65 FE) MG tablet   325 mg, Oral, Daily With Breakfast & Dinner      glucose monitor monitoring kit   Use four times daily to check blood glucose.      Lancets misc   Use to check blood sugar 7 times a day      levothyroxine 75 MCG tablet  Commonly known as: SYNTHROID, LEVOTHROID   75 mcg, Oral, Daily      metFORMIN  MG 24 hr tablet  Commonly known as: Glucophage XR   500 mg, Oral, Daily With Breakfast      metoclopramide 10 MG tablet  Commonly known as: Reglan   10 mg, Oral, Every 6 Hours PRN      ONE TOUCH ULTRA 2 w/Device kit   No dose, route, or frequency recorded.      pantoprazole 40 MG EC tablet  Commonly known as: Protonix   40 mg, Oral, Daily      prenatal (CLASSIC) vitamin  tablet  Generic drug: prenatal vitamin   1 tablet, Oral, Daily      promethazine 25 MG tablet  Commonly known as: PHENERGAN   25 mg, Oral, Every 6 Hours PRN      ZyrTEC Allergy 10 MG capsule  Generic drug: Cetirizine HCl   No dose, route, or frequency recorded.         Stop These Medications    B-D GLUCOSE 5 g chewable tablet  Generic drug: glucose     Blood Glucose Test strip      glucagon 1 MG injection  Commonly known as: GLUCAGEN     Glucagon 1 MG/0.2ML solution prefilled syringe     HumuLIN N KwikPen 100 UNIT/ML injection  Generic drug: Insulin NPH (Human) (Isophane)     Insulin Lispro (1 Unit Dial) 100 UNIT/ML solution pen-injector  Commonly known as: HumaLOG KwikPen     magnesium oxide 400 tablet tablet  Commonly known as: MAG-OX     ondansetron ODT 4 MG disintegrating tablet  Commonly known as: ZOFRAN-ODT     Triamcinolone Acetonide 55 MCG/ACT nasal inhaler  Commonly known as: NASACORT            Discharge Diet: Regular    Activity at Discharge:     Follow-up Appointments  Future Appointments   Date Time Provider Department Center   8/29/2023  1:15 PM Ijeoma Briceno MD [facility]           Prenatal labs/vax:   Immunization History   Administered Date(s) Administered    COVID-19 (PFIZER) BIVALENT 12+YRS 10/21/2022    COVID-19 (PFIZER) Purple Cap Monovalent 03/02/2021, 03/24/2021, 11/20/2021    Tdap 06/13/2023         External Prenatal Results     Pregnancy Outside Results - Transcribed From Office Records - See Scanned Records For Details     Test Value Date Time    ABO  O  08/08/23 2134    Rh  Positive  08/08/23 2134    Antibody Screen  Negative  08/08/23 2134       Negative  08/02/23 1720       Negative  05/21/23 2330       Negative  02/09/23 1026    Varicella IgG  730 index 02/09/23 1026    Rubella  2.18 index 02/09/23 1026    Hgb  9.0 g/dL 08/11/23 0724       11.7 g/dL 08/08/23 2134       11.6 g/dL 08/02/23 1720       11.6 g/dL 07/31/23 1508       11.1 g/dL 05/21/23 2330       12.2 g/dL 04/11/23 0940       12.6 g/dL 02/09/23 1026      ^ 12.9 g/dL 01/23/23 1220    Hct  27.0 % 08/11/23 0724       34.7 % 08/08/23 2134       34.7 % 08/02/23 1720       35.5 % 07/31/23 1508       33.1 % 05/21/23 2330       36.4 % 04/11/23 0940       39.0 % 02/09/23 1026      ^ 39.1 % 01/23/23 1220    Glucose Fasting GTT       Glucose Tolerance Test 1 hour       Glucose Tolerance Test 3 hour        Gonorrhea (discrete)  Negative  02/09/23 1143    Chlamydia (discrete)  Negative  02/09/23 1143    RPR  Non Reactive  02/09/23 1026    VDRL       Syphilis Antibody       HBsAg  Negative  02/09/23 1026    Herpes Simplex Virus PCR       Herpes Simplex VIrus Culture       HIV  Non Reactive  02/09/23 1026    Hep C RNA Quant PCR       Hep C Antibody  <0.1 s/co ratio 02/09/23 1026    AFP  61.2 ng/mL 04/11/23 0940    Group B Strep  Negative  07/25/23 1240    GBS Susceptibility to Clindamycin       GBS Susceptibility to Erythromycin       Fetal Fibronectin       Genetic Testing, Maternal Blood             Drug Screening     Test Value Date Time    Urine Drug Screen       Amphetamine Screen       Barbiturate Screen       Benzodiazepine Screen       Methadone Screen       Phencyclidine Screen       Opiates Screen       THC Screen       Cocaine Screen       Propoxyphene Screen       Buprenorphine Screen       Methamphetamine Screen       Oxycodone Screen       Tricyclic Antidepressants Screen             Legend    ^: Historical                          Mahin Donaldson MD  08/13/23  09:52 EDT

## 2023-08-14 NOTE — TELEPHONE ENCOUNTER
From: Khadra Khan  To: Ijeoma Briceno  Sent: 8/13/2023 11:06 PM EDT  Subject: Swelling    I was sent home with 5 days of Lasix but the swelling all over my body particularly in my legs and feet is overwhelming, uncomfortable and painful. I almost went to the [facility] ([place]) but decided to send you a message to see if you'd want to move up my follow up visit with you instead.     They are so tight that I can barely flex my feet. I can also feel fluid all the way up to my hips.     I'm also having uncontrollable crying. I know it's normal after delivery but I am really not feeling like myself.

## 2023-08-15 ENCOUNTER — POSTPARTUM VISIT (OUTPATIENT)
Dept: OBSTETRICS AND GYNECOLOGY | Age: 43
End: 2023-08-15
Payer: COMMERCIAL

## 2023-08-15 VITALS
SYSTOLIC BLOOD PRESSURE: 128 MMHG | DIASTOLIC BLOOD PRESSURE: 78 MMHG | BODY MASS INDEX: 47.09 KG/M2 | WEIGHT: 293 LBS | HEIGHT: 66 IN

## 2023-08-15 PROCEDURE — 0503F POSTPARTUM CARE VISIT: CPT | Performed by: OBSTETRICS & GYNECOLOGY

## 2023-08-15 RX ORDER — FUROSEMIDE 20 MG/1
20 TABLET ORAL 2 TIMES DAILY
Qty: 6 TABLET | Refills: 0 | Status: SHIPPED | OUTPATIENT
Start: 2023-08-15 | End: 2023-08-18

## 2023-08-15 RX ORDER — CITALOPRAM 20 MG/1
20 TABLET ORAL DAILY
Qty: 30 TABLET | Refills: 11 | Status: SHIPPED | OUTPATIENT
Start: 2023-08-15

## 2023-08-15 NOTE — PROGRESS NOTES
"Chief Complaint   Patient presents with    Postpartum Care     1 wk pp ck, vaginal delivery, second degree laceration, baby boy \"PENDLETON\", pumping/bottle feeding, pt still having welling in feet and ankles and electrical shock feeling in neck/back     Khadra Khan notes that her swelling has been terrible.  It is in her legs and her hips, she has been taking some Lasix with a bit of result but it is still very uncomfortable to walk.  No calf or back of thigh redness or pain  Her mood has not been great, she has random crying episodes.  She is currently taking Lexapro 10 mg    Her exam is normal, no calf tenderness or redness but she does have 2+ pitting edema of her shins and feet.  Blood pressure today is normal    I sent in some extra Lasix for her to take for the swelling    Recommended increasing her Lexapro to 20 mg and plan to follow-up in 3 weeks    Ijeoma Briceno MD  8/15/2023  17:00 EDT    "

## 2023-08-29 ENCOUNTER — HOSPITAL ENCOUNTER (OUTPATIENT)
Dept: LACTATION | Facility: HOSPITAL | Age: 43
Discharge: HOME | End: 2023-08-29
Payer: COMMERCIAL

## 2023-08-29 NOTE — LACTATION NOTE
[facility] here today with Baby Kobe hoping to work on latching. Kobe was born at 37w0d and has never been able to maintain a latch with a nutritive suckle longer than 3-5 mins. They have tried a 24 mm NS but he moves about so much it won't stay on. A 20 mm NS was attempted which he was very happy with and managed to stay on for at least 10 mins but he was clicking , losing contact with the nipple and not transferring milk. After 20 mins on right breast and 5 on left he was given a bottle of formula .  [facility] has been pumping consistently but recently switched to pumping one breast at a time . Enc her to purchase a hands-free pumping bra as she struggles with carpal tunnel .  [facility] is switching to a wide-mouth bottle with short nipple as Kobe is showing signs of preference. He was identified to have a possible posterior   Tongue tie by his pediatrician. We will evaluate next week to see if latch had improved .  BW 6-14.6  Lowest weight 6-7  Today 7-5.8   10cc transferred at breast  2 oz formula   Returning next   Lactation Consult Note    Evaluation Completed: 2023 13:00 EDT  Patient Name: Khadra Khan  :  1980  MRN:  [id number]     REFERRAL  INFORMATION:                          Date of Referral: 23   Person Making Referral: patient  Maternal Reason for Referral: breastfeeding currently, no prior breastfeeding experience, latch difficulty  Infant Reason for Referral: 35-37 weeks gestation (possible posterior tongue tie)      MATERNAL ASSESSMENT:     Breast Shape: pendulous, round (23 1100)  Breast Density: full, soft (23 1100)  Areola: elastic (23 1100)  Nipples: everted, graspable (23 1100)     Left Nipple Symptoms: intact (23 1100)  Right Nipple Symptoms: intact (23 1100)       MATERNAL INFANT FEEDING:     Maternal Emotional State: receptive, relaxed (23 1100)  Infant Positioning: clutch/football (23)   Signs of Milk  Transfer: audible swallow, suck/swallow ratio, transfer present (23)  Pain with Feeding: no (23)           Milk Ejection Reflex: present (23)           Latch Assistance: full assistance needed (23)                           Feeding Readiness Cues: eager, energy for feeding, hand to mouth movements, rooting, sucking motion present (23)  Satiety Cues: calm after feeding, sleeping after feeding (23)     Effective Latch During Feeding: other (see comments) (23)  Suck/Swallow/Breathing Coordination:  (inconsistent) (23)     Prefeeding Weight (gm): 3338 g (117.7 oz) (23)  Postfeeding Weight (gm): 09742 g (1176.3 oz) (23)         Latch: 1-->repeated attempts, holds nipple in mouth, stimulate to suck (23)  Audible Swallowin-->a few with stimulation (23)  Type of Nipple: 2-->everted (after stimulation) (23)  Comfort (Breast/Nipple): 2-->soft/nontender (23)  Hold (Positioning): 0-->full assist (staff holds infant at breast) (23)  Latch Score: 6 (23)      EQUIPMENT TYPE:  Breast Pump Type: double electric, personal, manual pump (23)  Breast Pump Flange Type: hard (23)  Breast Pump Flange Size: 24 mm (23)                        BREAST PUMPING:  Breast Pumping Interventions: other (see comments) (pump both breasts at once) (23)       LACTATION REFERRALS:  Lactation Referrals: outpatient lactation program (23)

## 2023-09-11 ENCOUNTER — HOSPITAL ENCOUNTER (OUTPATIENT)
Dept: LACTATION | Facility: HOSPITAL | Age: 43
Discharge: HOME | End: 2023-09-11
Payer: COMMERCIAL

## 2023-09-11 NOTE — LACTATION NOTE
Pt reports she is still having difficulty latching baby at home. She reports he may suck once or twice. She has been pumping every 2-4 hours and getting 2-5 oz with each pumping. She reports baby takes 2 oz every 3 hours and sometimes he wants more between feedings. LC assisted pt with latching baby to left breast. Breast support  helps with latching baby. After multiple attempts at latching baby was able to grasp nipple a few times. Baby appears to have chompy suck even with bottle feeding. He makes clicking noises but does better with chin support. Encouraged pt to keep doing skin to skin with baby and attempting to latch. Encouraged to cont to pump at least every 3 hours and increase baby's bottle feeding to 3-4 oz. Baby is gaining weight slowly. Today baby tolerated 3.5 oz. Encouraged pt to speak to pediatrician about baby's chompy suck and slow weight gain. Pt reports baby gets really sweaty at times and sleepy. She is concerned about baby's blood sugar level. Encouraged pt to also speak to ped. About this. It might be helpful if pt took baby to ped. Dentist. [facility].     23  Birth weight 6-14.6  23 weight 7-5.8  Today's weight 7-11.6    Lactation Consult Note    Evaluation Completed: 2023 16:19 EDT  Patient Name: Khadra Khan  :  1980  MRN:  [id number]     REFERRAL  INFORMATION:                          Date of Referral: 23      Maternal Reason for Referral: latch difficulty         MATERNAL ASSESSMENT:     Breast Shape: angled (23 1600)  Breast Density: soft (23 1600)  Areola: elastic (23 1600)  Nipples: flat (23 1600)                MATERNAL INFANT FEEDING:     Maternal Emotional State: relaxed (23 1600)  Infant Positioning: clutch/football, cross-cradle (23 1600)                              Latch Assistance: minimal assistance (23)                           Feeding Readiness Cues: eager, rooting  (09/11/23 1600)        Effective Latch During Feeding: no (09/11/23 1600)        Prefeeding Weight (gm): 3504 g (123.6 oz) (09/11/23 1600)                                  EQUIPMENT TYPE:  Breast Pump Type: double electric, personal (09/11/23 1600)  Breast Pump Flange Type: hard (09/11/23 1600)  Breast Pump Flange Size: 28 mm (09/11/23 1600)                        BREAST PUMPING:  Breast Pumping Interventions: frequent pumping encouraged (09/11/23 1600)       LACTATION REFERRALS:

## 2023-09-12 ENCOUNTER — POSTPARTUM VISIT (OUTPATIENT)
Dept: OBSTETRICS AND GYNECOLOGY | Age: 43
End: 2023-09-12
Payer: COMMERCIAL

## 2023-09-12 VITALS
BODY MASS INDEX: 46.77 KG/M2 | SYSTOLIC BLOOD PRESSURE: 130 MMHG | WEIGHT: 291 LBS | DIASTOLIC BLOOD PRESSURE: 72 MMHG | HEIGHT: 66 IN

## 2023-09-12 PROCEDURE — 0503F POSTPARTUM CARE VISIT: CPT | Performed by: OBSTETRICS & GYNECOLOGY

## 2023-09-12 RX ORDER — OMEPRAZOLE 40 MG/1
40 CAPSULE, DELAYED RELEASE ORAL DAILY
Qty: 90 CAPSULE | Refills: 3 | Status: SHIPPED | OUTPATIENT
Start: 2023-09-12

## 2023-09-12 RX ORDER — METOCLOPRAMIDE 10 MG/1
10 TABLET ORAL 3 TIMES DAILY
Qty: 90 TABLET | Refills: 4 | Status: SHIPPED | OUTPATIENT
Start: 2023-09-12

## 2023-09-12 NOTE — PROGRESS NOTES
Chief Complaint   Patient presents with    Gynecologic Exam     CC: 4 wks PP check, till dealing with carpal tunnel  and has questions about some meds     Khadra Khan is doing well, no mood complaints, much better after going up on her SSRI. Still having issues with carpal tunnel, discussed making appt with [facility].  Can't quite keep up with breastmilk supply, will try course of reglan. Discussed possible A/E including TD.  No bleeding issues  Will stop synthroid and repeat labs as it was just started by fertility.  Fasting GTT next time    Return for four weeks with fasting glucose test (postpartum).    Ijeoma Briceno MD  9/12/2023  09:36 EDT

## 2023-10-10 ENCOUNTER — POSTPARTUM VISIT (OUTPATIENT)
Dept: OBSTETRICS AND GYNECOLOGY | Age: 43
End: 2023-10-10
Payer: COMMERCIAL

## 2023-10-10 ENCOUNTER — PATIENT MESSAGE (OUTPATIENT)
Dept: OBSTETRICS AND GYNECOLOGY | Age: 43
End: 2023-10-10

## 2023-10-10 VITALS
SYSTOLIC BLOOD PRESSURE: 126 MMHG | HEIGHT: 66 IN | WEIGHT: 293 LBS | BODY MASS INDEX: 47.09 KG/M2 | DIASTOLIC BLOOD PRESSURE: 70 MMHG

## 2023-10-10 DIAGNOSIS — Z13.1 ENCOUNTER FOR SCREENING EXAMINATION FOR IMPAIRED GLUCOSE REGULATION AND DIABETES MELLITUS: Primary | ICD-10-CM

## 2023-10-10 NOTE — TELEPHONE ENCOUNTER
From: Khadra Khan  To: Ijeoma Briceno  Sent: 10/10/2023 9:29 AM EDT  Subject: Healing    So how long would you expect that it will be until that spot heals that you treated today?     Thank you!

## 2023-10-10 NOTE — PROGRESS NOTES
"Chief Complaint   Patient presents with    Gynecologic Exam     CC: 8 wks PP check with GTT, no complaints      Khadra Khan is doing well but unfortunately her mother-in-law passed away recently.  Of course this has been stressful.  Her milk supply has gone down a bit.  But otherwise she is doing okay, baby Kobe is healthy and doing well.  She has not been sexually active as of yet.  No plans for contraception, they have significant male factor infertility.    /70   Ht 167.6 cm (66\")   Wt 133 kg (294 lb)   LMP 09/06/2023   Breastfeeding Yes   BMI 47.45 kg/mý   Well, no distress  Regular, nonlabored breathing  Perineum with an area of granulation tissue at the posterior introitus.  Some silver nitrate was applied to this today    A/P  Diagnoses and all orders for this visit:    1. Encounter for screening examination for impaired glucose regulation and diabetes mellitus (Primary)  -     Gestational GTT 2 Hr ([facility])    2. Gestational diabetes mellitus (GDM) in childbirth, insulin controlled  -     Glucose Tolerance, 2 Hours      2-hour GTT today due to insulin requiring early onset gestational diabetes  She has a tender area of granulation tissue in the posterior fourchette, some silver nitrate was applied today.  With another week or so before trying to have intercourse  Pap is up-to-date    Follow-up next year    Ijeoma Briceno MD  10/10/2023  13:06 EDT    "

## 2023-10-11 LAB
GLUCOSE 1H P 75 G GLC PO SERPL-MCNC: 146 MG/DL (ref 65–179)
GLUCOSE 2H P 75 G GLC PO SERPL-MCNC: 74 MG/DL (ref 65–154)
GLUCOSE P FAST SERPL-MCNC: 105 MG/DL (ref 65–94)

## 2023-11-29 DIAGNOSIS — Z12.31 VISIT FOR SCREENING MAMMOGRAM: Primary | ICD-10-CM

## 2024-02-28 RX ORDER — BUSPIRONE HYDROCHLORIDE 7.5 MG/1
7.5 TABLET ORAL 2 TIMES DAILY
Qty: 180 TABLET | Refills: 0 | Status: SHIPPED | OUTPATIENT
Start: 2024-02-28

## 2024-03-18 RX ORDER — IBUPROFEN 800 MG/1
800 TABLET ORAL EVERY 8 HOURS PRN
Qty: 40 TABLET | Refills: 2 | OUTPATIENT
Start: 2024-03-18

## 2024-07-02 ENCOUNTER — TELEPHONE (OUTPATIENT)
Dept: OBSTETRICS AND GYNECOLOGY | Age: 44
End: 2024-07-02

## 2024-07-02 NOTE — TELEPHONE ENCOUNTER
Caller: Khadra Khan    Relationship: Self    Best call back number: [phone]    What was the call regarding: PT WOULD LIKE TO SCHEDULE A MAMMOGRAM APPT

## 2024-07-15 ENCOUNTER — HOSPITAL ENCOUNTER (OUTPATIENT)
Dept: MAMMOGRAPHY | Facility: HOSPITAL | Age: 44
Discharge: HOME | End: 2024-07-15
Admitting: OBSTETRICS & GYNECOLOGY
Payer: COMMERCIAL

## 2024-07-15 DIAGNOSIS — Z12.31 VISIT FOR SCREENING MAMMOGRAM: ICD-10-CM

## 2024-07-15 PROCEDURE — 77063 BREAST TOMOSYNTHESIS BI: CPT

## 2024-07-15 PROCEDURE — 77067 SCR MAMMO BI INCL CAD: CPT

## 2024-07-16 DIAGNOSIS — N64.89 BREAST ASYMMETRY: ICD-10-CM

## 2024-07-16 DIAGNOSIS — R92.8 ABNORMAL MAMMOGRAM: Primary | ICD-10-CM

## 2024-07-16 NOTE — PROGRESS NOTES
Please notify that there is an area in the right breast that requires additional evaluation with diagnostic mammogram and ultrasound. It does not appear suspicious for cancer on this mammogram but needs additional evaluation. The left appears normal  Please order additional studies    Ijeoma Briceno MD  7/16/2024  08:44 EDT

## 2024-07-17 ENCOUNTER — TELEPHONE (OUTPATIENT)
Dept: OBSTETRICS AND GYNECOLOGY | Age: 44
End: 2024-07-17

## 2024-07-17 NOTE — TELEPHONE ENCOUNTER
Caller: Khadra Khan    Relationship: Self    Best call back number: [phone]    What is the best time to reach you: ANY    Who are you requesting to speak with (clinical staff, provider,  specific staff member): RAFAEL    Do you know the name of the person who called: RAFAEL    What was the call regarding: SCHEDULING MAMMO SHE THINKS    Is it okay if the provider responds through MyChart: YES

## 2024-08-20 ENCOUNTER — HOSPITAL ENCOUNTER (OUTPATIENT)
Dept: ULTRASOUND IMAGING | Facility: HOSPITAL | Age: 44
Discharge: HOME | End: 2024-08-20
Payer: COMMERCIAL

## 2024-08-20 ENCOUNTER — HOSPITAL ENCOUNTER (OUTPATIENT)
Dept: MAMMOGRAPHY | Facility: HOSPITAL | Age: 44
Discharge: HOME | End: 2024-08-20
Payer: COMMERCIAL

## 2024-08-20 DIAGNOSIS — R92.8 ABNORMAL MAMMOGRAM: ICD-10-CM

## 2024-08-20 DIAGNOSIS — N64.89 BREAST ASYMMETRY: ICD-10-CM

## 2024-08-20 PROCEDURE — G0279 TOMOSYNTHESIS, MAMMO: HCPCS

## 2024-08-20 PROCEDURE — 76642 ULTRASOUND BREAST LIMITED: CPT

## 2024-08-20 PROCEDURE — 77065 DX MAMMO INCL CAD UNI: CPT

## 2024-10-22 ENCOUNTER — OFFICE VISIT (OUTPATIENT)
Dept: OBSTETRICS AND GYNECOLOGY | Age: 44
End: 2024-10-22
Payer: COMMERCIAL

## 2024-10-22 VITALS
WEIGHT: 293 LBS | HEIGHT: 66 IN | DIASTOLIC BLOOD PRESSURE: 72 MMHG | SYSTOLIC BLOOD PRESSURE: 120 MMHG | BODY MASS INDEX: 47.09 KG/M2

## 2024-10-22 DIAGNOSIS — Z12.4 SCREENING FOR MALIGNANT NEOPLASM OF THE CERVIX: ICD-10-CM

## 2024-10-22 DIAGNOSIS — Z00.00 HEALTH MAINTENANCE EXAMINATION: ICD-10-CM

## 2024-10-22 DIAGNOSIS — Z13.1 SCREENING FOR DIABETES MELLITUS: ICD-10-CM

## 2024-10-22 DIAGNOSIS — N92.0 MENORRHAGIA WITH REGULAR CYCLE: ICD-10-CM

## 2024-10-22 DIAGNOSIS — L70.9 ACNE, UNSPECIFIED ACNE TYPE: ICD-10-CM

## 2024-10-22 DIAGNOSIS — Z12.31 ENCOUNTER FOR SCREENING MAMMOGRAM FOR MALIGNANT NEOPLASM OF BREAST: Primary | ICD-10-CM

## 2024-10-22 DIAGNOSIS — Z00.00 ENCOUNTER FOR ANNUAL PHYSICAL EXAM: ICD-10-CM

## 2024-10-22 DIAGNOSIS — Z11.51 SPECIAL SCREENING EXAMINATION FOR HUMAN PAPILLOMAVIRUS (HPV): ICD-10-CM

## 2024-10-22 DIAGNOSIS — R23.2 HOT FLASHES: ICD-10-CM

## 2024-10-22 PROBLEM — O09.92 HIGH-RISK PREGNANCY IN SECOND TRIMESTER: Status: RESOLVED | Noted: 2023-02-28 | Resolved: 2024-10-22

## 2024-10-22 PROBLEM — Z34.90 PREGNANCY: Status: RESOLVED | Noted: 2023-02-09 | Resolved: 2024-10-22

## 2024-10-22 NOTE — PROGRESS NOTES
Routine Annual Visit    10/22/2024    Patient: Khadra Khan          MR#:[id number]      Chief Complaint   Patient presents with    Gynecologic Exam     Cc: annual visit today , last pap 3/22/22 neg , mammogram with R breast US 24  benign , contraception none , normal periods a little heavier duration 3 days  , c/o acne , hot flashes , sweating , nausea every day - wants to discuss the issue , no mood swing        History of Present Illness    43 y.o. female  who presents for annual exam. Menses are heavy, monthly. She soaks through heavy pad and tampon at times.   She has terrible hot flashes and sweating  Having some nausea in the morning and in the afternoon, also in the afternoon  Lightheadedness when she stands up from giving cesar a bath  She has also noted some perioral and chin dermatitis like acne, has tried steroid and improved but returns.    Patient's last menstrual period was 10/03/2024 (exact date).  Obstetric History:  OB History          1    Para   1    Term   1       0    AB   0    Living   1         SAB   0    IAB   0    Ectopic   0    Molar   0    Multiple   0    Live Births   1               Menstrual History:     Patient's last menstrual period was 10/03/2024 (exact date).       ________________________________________  Patient Active Problem List   Diagnosis    Morbid obesity with BMI of 50.0-59.9, adult    Male factor infertility    Cystic fibrosis carrier    History of sleeve gastrectomy    AMA (advanced maternal age) multigravida 35+    Pregnancy conceived through in vitro fertilization    Elevated hemoglobin A1c    Subclinical hypothyroidism    Dehydration during pregnancy    Gestational hypertension    Abnormality of right breast on screening mammogram       Past Medical History:   Diagnosis Date    Anemia     Anxiety     Arthritis     Asthma     Seasonal, haven't used inhaler for 2 years.    Cystic fibrosis carrier     Depression     GERD (gastroesophageal  "reflux disease)     Gestational diabetes     Nausea & vomiting     PONV (postoperative nausea and vomiting)     Varicella        Family History   Problem Relation Age of Onset    Hypertension Father     Anxiety disorder Father     Diabetes Father     Heart attack Father 60        2022    Hypertension Mother     Anxiety disorder Mother     Depression Mother     Diabetes Mother     Heart disease Paternal Grandfather     Stroke Paternal Grandmother     Kidney failure Maternal Grandmother         dialysis    Diabetes Maternal Grandmother     Breast cancer Neg Hx     Ovarian cancer Neg Hx     Uterine cancer Neg Hx     Colon cancer Neg Hx     Melanoma Neg Hx     Prostate cancer Neg Hx     Pancreatic cancer Neg Hx     Congenital heart disease Neg Hx     Cystic fibrosis Neg Hx        Past Surgical History:   Procedure Laterality Date    COLONOSCOPY      GI issues while in high school.     GASTRIC SLEEVE LAPAROSCOPIC  09/01/2020    shekhar will    TONSILLECTOMY      TRUNK SKIN LESION EXCISIONAL BIOPSY      benign lesion    TYMPANOSTOMY TUBE PLACEMENT      WISDOM TOOTH EXTRACTION         Social History     Tobacco Use   Smoking Status Never    Passive exposure: Never   Smokeless Tobacco Never       has a current medication list which includes the following prescription(s): buspirone, zyrtec allergy, citalopram, ibuprofen, and omeprazole.  ________________________________________      Objective   Physical Exam    /72   Ht 167.6 cm (66\")   Wt 135 kg (298 lb)   LMP 10/03/2024 (Exact Date)   Breastfeeding No   BMI 48.10 kg/m²    BP Readings from Last 3 Encounters:   10/22/24 120/72   10/10/23 126/70   09/12/23 130/72      Wt Readings from Last 3 Encounters:   10/22/24 135 kg (298 lb)   10/10/23 133 kg (294 lb)   09/12/23 132 kg (291 lb)         BMI: Body mass index is 48.1 kg/m².       General:   alert, appears stated age, and cooperative   Neck: No thyromegaly or LAD   Abdomen: soft, non-tender, without masses or " organomegaly   Breast: inspection negative, no nipple discharge or bleeding, no masses or nodularity palpable   Urethra and bladder: urethral meatus normal; bladder nontender to palpation;   Vulva: normal, Bartholin's, Urethra, [place]'s normal   Vagina: normal mucosa, normal discharge   Cervix: multiparous appearance and no lesions   Uterus: normal size, non-tender, and anteverted   Adnexa: normal adnexa and no mass, fullness, tenderness       Assessment:    normal annual exam   Hot flashes  Heavy menstrual bleeding  acne    Plan:    Plan     [x]  Mammogram request made  [x]  PAP done  []  Labs:   []  GC/Chl/TV  []  DEXA scan   []  Referral for colonoscopy:     Diagnoses and all orders for this visit:    1. Encounter for screening mammogram for malignant neoplasm of breast (Primary)  -     Mammo Screening Digital Tomosynthesis Bilateral With CAD; Future    2. Screening for diabetes mellitus  -     Hemoglobin A1c    3. Health maintenance examination  -     Lipid Panel    4. Hot flashes  -     TSH Rfx On Abnormal To Free T4  -     Follicle Stimulating Hormone  -     Estradiol    5. Menorrhagia with regular cycle  -     CBC (No Diff)  -     Ferritin    6. Acne, unspecified acne type  -     Testosterone Free Direct    7. Special screening examination for human papillomavirus (HPV)  -     IGP, Aptima HPV, Rfx 16 / 18,45    8. Screening for malignant neoplasm of the cervix  -     IGP, Aptima HPV, Rfx 16 / 18,45    9. Encounter for annual physical exam  -     IGP, Aptima HPV, Rfx 16 / 18,45      Her transvaginal ultrasound today appears normal, no polyp or fibroid etc.  The cause of her heavy bleeding is unknown, discussed some therapies such as OCPs, Mirena and she desires to wait for now.  If she has significant anemia then would definitely recommend treatment    Counseling  [x]  Nutrition  [x]  Physical activity/regular exercise   [x]  Healthy weight  []  Injury prevention  []  Smoking cessation  []  Substance  misuse/abuse  [x]  Sexual behavior  []  STD prevention  []  Contraception  []  Dental health  []  Mental health  []  Immunization  [x]  Encouraged SBE        Ijeoma Briceno MD  10/22/2024  10:38 EDT;

## 2024-10-23 LAB
CHOLEST SERPL-MCNC: 163 MG/DL (ref 0–200)
ERYTHROCYTE [DISTWIDTH] IN BLOOD BY AUTOMATED COUNT: 13.3 % (ref 12.3–15.4)
ESTRADIOL SERPL-MCNC: 85.7 PG/ML
FERRITIN SERPL-MCNC: 14.8 NG/ML (ref 13–150)
FSH SERPL-ACNC: 4.6 MIU/ML
HBA1C MFR BLD: 5.6 % (ref 4.8–5.6)
HCT VFR BLD AUTO: 36.6 % (ref 34–46.6)
HDLC SERPL-MCNC: 55 MG/DL (ref 40–60)
HGB BLD-MCNC: 11.3 G/DL (ref 12–15.9)
LDLC SERPL CALC-MCNC: 92 MG/DL (ref 0–100)
MCH RBC QN AUTO: 25.2 PG (ref 26.6–33)
MCHC RBC AUTO-ENTMCNC: 30.9 G/DL (ref 31.5–35.7)
MCV RBC AUTO: 81.7 FL (ref 79–97)
PLATELET # BLD AUTO: 419 10*3/MM3 (ref 140–450)
RBC # BLD AUTO: 4.48 10*6/MM3 (ref 3.77–5.28)
TESTOST FREE SERPL-MCNC: 0.9 PG/ML (ref 0–4.2)
TRIGL SERPL-MCNC: 85 MG/DL (ref 0–150)
TSH SERPL DL<=0.005 MIU/L-ACNC: 2.84 UIU/ML (ref 0.27–4.2)
VLDLC SERPL CALC-MCNC: 16 MG/DL (ref 5–40)
WBC # BLD AUTO: 8.21 10*3/MM3 (ref 3.4–10.8)

## 2024-10-25 LAB
CYTOLOGIST CVX/VAG CYTO: NORMAL
CYTOLOGY CVX/VAG DOC CYTO: NORMAL
CYTOLOGY CVX/VAG DOC THIN PREP: NORMAL
DX ICD CODE: NORMAL
HPV GENOTYPE REFLEX: NORMAL
HPV I/H RISK 4 DNA CVX QL PROBE+SIG AMP: NEGATIVE
Lab: NORMAL
OTHER STN SPEC: NORMAL
STAT OF ADQ CVX/VAG CYTO-IMP: NORMAL

## 2024-10-28 ENCOUNTER — TELEPHONE (OUTPATIENT)
Dept: OBSTETRICS AND GYNECOLOGY | Age: 44
End: 2024-10-28
Payer: COMMERCIAL

## 2024-10-28 NOTE — PROGRESS NOTES
Please notify that her Pap was normal and HPV negative    Ijeoma Briceno MD  10/28/2024  08:21 EDT

## 2024-10-31 RX ORDER — OMEPRAZOLE 40 MG/1
40 CAPSULE, DELAYED RELEASE ORAL DAILY
Qty: 90 CAPSULE | Refills: 3 | Status: SHIPPED | OUTPATIENT
Start: 2024-10-31

## 2025-07-17 ENCOUNTER — TRANSCRIBE ORDERS (OUTPATIENT)
Dept: ADMINISTRATIVE | Facility: HOSPITAL | Age: 45
End: 2025-07-17
Payer: COMMERCIAL

## 2025-07-17 ENCOUNTER — HOSPITAL ENCOUNTER (OUTPATIENT)
Dept: MAMMOGRAPHY | Facility: HOSPITAL | Age: 45
Discharge: HOME | End: 2025-07-17
Admitting: OBSTETRICS & GYNECOLOGY
Payer: COMMERCIAL

## 2025-07-17 DIAGNOSIS — Z12.31 BREAST CANCER SCREENING BY MAMMOGRAM: Primary | ICD-10-CM

## 2025-07-17 DIAGNOSIS — Z12.31 ENCOUNTER FOR SCREENING MAMMOGRAM FOR MALIGNANT NEOPLASM OF BREAST: ICD-10-CM

## 2025-07-17 PROCEDURE — 77063 BREAST TOMOSYNTHESIS BI: CPT

## 2025-07-17 PROCEDURE — 77067 SCR MAMMO BI INCL CAD: CPT
